# Patient Record
Sex: MALE | Race: WHITE | NOT HISPANIC OR LATINO | Employment: FULL TIME | URBAN - METROPOLITAN AREA
[De-identification: names, ages, dates, MRNs, and addresses within clinical notes are randomized per-mention and may not be internally consistent; named-entity substitution may affect disease eponyms.]

---

## 2024-05-22 ENCOUNTER — ANESTHESIA EVENT (INPATIENT)
Dept: PERIOP | Facility: HOSPITAL | Age: 39
End: 2024-05-22
Payer: COMMERCIAL

## 2024-05-22 ENCOUNTER — HOSPITAL ENCOUNTER (INPATIENT)
Facility: HOSPITAL | Age: 39
LOS: 14 days | Discharge: HOME | DRG: 710 | End: 2024-06-05
Attending: SURGERY | Admitting: SURGERY
Payer: COMMERCIAL

## 2024-05-22 ENCOUNTER — APPOINTMENT (EMERGENCY)
Dept: RADIOLOGY | Facility: HOSPITAL | Age: 39
End: 2024-05-22
Payer: COMMERCIAL

## 2024-05-22 ENCOUNTER — ANESTHESIA (INPATIENT)
Dept: PERIOP | Facility: HOSPITAL | Age: 39
End: 2024-05-22
Payer: COMMERCIAL

## 2024-05-22 ENCOUNTER — APPOINTMENT (EMERGENCY)
Dept: CT IMAGING | Facility: HOSPITAL | Age: 39
End: 2024-05-22
Payer: COMMERCIAL

## 2024-05-22 ENCOUNTER — APPOINTMENT (OUTPATIENT)
Dept: RADIOLOGY | Facility: HOSPITAL | Age: 39
DRG: 710 | End: 2024-05-22
Payer: COMMERCIAL

## 2024-05-22 ENCOUNTER — APPOINTMENT (INPATIENT)
Dept: RADIOLOGY | Facility: HOSPITAL | Age: 39
DRG: 710 | End: 2024-05-22
Payer: COMMERCIAL

## 2024-05-22 ENCOUNTER — HOSPITAL ENCOUNTER (EMERGENCY)
Facility: HOSPITAL | Age: 39
Discharge: NURSING, REHAB OR LONG-TERM CARE | End: 2024-05-22
Attending: EMERGENCY MEDICINE
Payer: COMMERCIAL

## 2024-05-22 VITALS
DIASTOLIC BLOOD PRESSURE: 59 MMHG | BODY MASS INDEX: 35.92 KG/M2 | RESPIRATION RATE: 44 BRPM | SYSTOLIC BLOOD PRESSURE: 109 MMHG | TEMPERATURE: 97.8 F | WEIGHT: 250.88 LBS | OXYGEN SATURATION: 94 % | HEART RATE: 117 BPM | HEIGHT: 70 IN

## 2024-05-22 DIAGNOSIS — J86.9 EMPYEMA, LEFT (HCC): ICD-10-CM

## 2024-05-22 DIAGNOSIS — K75.0 LIVER ABSCESS: Primary | ICD-10-CM

## 2024-05-22 DIAGNOSIS — K65.1: ICD-10-CM

## 2024-05-22 DIAGNOSIS — K75.0 ABSCESS OF LIVER: Primary | ICD-10-CM

## 2024-05-22 LAB
2HR DELTA HS TROPONIN: 0 NG/L
ABO GROUP BLD: NORMAL
ABO GROUP BLD: NORMAL
ALBUMIN SERPL BCP-MCNC: 2.3 G/DL (ref 3.5–5)
ALBUMIN SERPL BCP-MCNC: 2.8 G/DL (ref 3.5–5)
ALP SERPL-CCNC: 110 U/L (ref 34–104)
ALP SERPL-CCNC: 77 U/L (ref 34–104)
ALP SERPL-CCNC: 77 U/L (ref 34–104)
ALP SERPL-CCNC: 94 U/L (ref 34–104)
ALT SERPL W P-5'-P-CCNC: 104 U/L (ref 7–52)
ALT SERPL W P-5'-P-CCNC: 78 U/L (ref 7–52)
ALT SERPL W P-5'-P-CCNC: 78 U/L (ref 7–52)
ALT SERPL W P-5'-P-CCNC: 81 U/L (ref 7–52)
AMMONIA PLAS-SCNC: 89 UMOL/L (ref 18–72)
ANION GAP SERPL CALCULATED.3IONS-SCNC: 12 MMOL/L (ref 4–13)
ANION GAP SERPL CALCULATED.3IONS-SCNC: 17 MMOL/L (ref 4–13)
ANION GAP SERPL CALCULATED.3IONS-SCNC: 18 MMOL/L (ref 4–13)
APTT PPP: 25 SECONDS (ref 23–37)
ARTERIAL PATENCY WRIST A: NO
ARTERIAL PATENCY WRIST A: NO
AST SERPL W P-5'-P-CCNC: 62 U/L (ref 13–39)
AST SERPL W P-5'-P-CCNC: 77 U/L (ref 13–39)
AST SERPL W P-5'-P-CCNC: 85 U/L (ref 13–39)
AST SERPL W P-5'-P-CCNC: 85 U/L (ref 13–39)
BACTERIA UR QL AUTO: ABNORMAL /HPF
BASE EX.OXY STD BLDV CALC-SCNC: 60.6 % (ref 60–80)
BASE EX.OXY STD BLDV CALC-SCNC: 90.2 % (ref 60–80)
BASE EXCESS BLDA CALC-SCNC: -1 MMOL/L
BASE EXCESS BLDA CALC-SCNC: -10.5 MMOL/L
BASE EXCESS BLDA CALC-SCNC: -5.4 MMOL/L
BASE EXCESS BLDA CALC-SCNC: -6 MMOL/L (ref -2–3)
BASE EXCESS BLDA CALC-SCNC: -6 MMOL/L (ref -2–3)
BASE EXCESS BLDA CALC-SCNC: -9 MMOL/L (ref -2–3)
BASE EXCESS BLDV CALC-SCNC: -5.4 MMOL/L
BASE EXCESS BLDV CALC-SCNC: -5.5 MMOL/L
BASOPHILS # BLD MANUAL: 0 THOUSAND/UL (ref 0–0.1)
BASOPHILS NFR MAR MANUAL: 0 % (ref 0–1)
BILIRUB DIRECT SERPL-MCNC: 0.92 MG/DL (ref 0–0.2)
BILIRUB DIRECT SERPL-MCNC: 1.66 MG/DL (ref 0–0.2)
BILIRUB SERPL-MCNC: 2.52 MG/DL (ref 0.2–1)
BILIRUB SERPL-MCNC: 2.52 MG/DL (ref 0.2–1)
BILIRUB SERPL-MCNC: 3.17 MG/DL (ref 0.2–1)
BILIRUB SERPL-MCNC: 4.32 MG/DL (ref 0.2–1)
BILIRUB UR QL STRIP: ABNORMAL
BLD GP AB SCN SERPL QL: NEGATIVE
BUN SERPL-MCNC: 61 MG/DL (ref 5–25)
BUN SERPL-MCNC: 67 MG/DL (ref 5–25)
BUN SERPL-MCNC: 70 MG/DL (ref 5–25)
CA-I BLD-SCNC: 0.93 MMOL/L (ref 1.12–1.32)
CA-I BLD-SCNC: 1.05 MMOL/L (ref 1.12–1.32)
CA-I BLD-SCNC: 1.08 MMOL/L (ref 1.12–1.32)
CA-I BLD-SCNC: 1.15 MMOL/L (ref 1.12–1.32)
CALCIUM ALBUM COR SERPL-MCNC: 8.2 MG/DL (ref 8.3–10.1)
CALCIUM ALBUM COR SERPL-MCNC: 8.9 MG/DL (ref 8.3–10.1)
CALCIUM ALBUM COR SERPL-MCNC: 9.3 MG/DL (ref 8.3–10.1)
CALCIUM SERPL-MCNC: 6.8 MG/DL (ref 8.4–10.2)
CALCIUM SERPL-MCNC: 7.5 MG/DL (ref 8.4–10.2)
CALCIUM SERPL-MCNC: 8.3 MG/DL (ref 8.4–10.2)
CARDIAC TROPONIN I PNL SERPL HS: 6 NG/L
CARDIAC TROPONIN I PNL SERPL HS: 6 NG/L
CHLORIDE SERPL-SCNC: 103 MMOL/L (ref 96–108)
CHLORIDE SERPL-SCNC: 91 MMOL/L (ref 96–108)
CHLORIDE SERPL-SCNC: 96 MMOL/L (ref 96–108)
CLARITY UR: ABNORMAL
CO2 SERPL-SCNC: 19 MMOL/L (ref 21–32)
CO2 SERPL-SCNC: 20 MMOL/L (ref 21–32)
CO2 SERPL-SCNC: 22 MMOL/L (ref 21–32)
COLOR UR: ABNORMAL
CREAT SERPL-MCNC: 1.58 MG/DL (ref 0.6–1.3)
CREAT SERPL-MCNC: 2 MG/DL (ref 0.6–1.3)
CREAT SERPL-MCNC: 2.82 MG/DL (ref 0.6–1.3)
EOSINOPHIL # BLD MANUAL: 0.23 THOUSAND/UL (ref 0–0.4)
EOSINOPHIL NFR BLD MANUAL: 1 % (ref 0–6)
ERYTHROCYTE [DISTWIDTH] IN BLOOD BY AUTOMATED COUNT: 13.9 % (ref 11.6–15.1)
ERYTHROCYTE [DISTWIDTH] IN BLOOD BY AUTOMATED COUNT: 14 % (ref 11.6–15.1)
ERYTHROCYTE [DISTWIDTH] IN BLOOD BY AUTOMATED COUNT: 14.6 % (ref 11.6–15.1)
FLUAV RNA RESP QL NAA+PROBE: NEGATIVE
FLUBV RNA RESP QL NAA+PROBE: NEGATIVE
GFR SERPL CREATININE-BSD FRML MDRD: 26 ML/MIN/1.73SQ M
GFR SERPL CREATININE-BSD FRML MDRD: 40 ML/MIN/1.73SQ M
GFR SERPL CREATININE-BSD FRML MDRD: 54 ML/MIN/1.73SQ M
GLUCOSE SERPL-MCNC: 115 MG/DL (ref 65–140)
GLUCOSE SERPL-MCNC: 117 MG/DL (ref 65–140)
GLUCOSE SERPL-MCNC: 125 MG/DL (ref 65–140)
GLUCOSE SERPL-MCNC: 127 MG/DL (ref 65–140)
GLUCOSE SERPL-MCNC: 130 MG/DL (ref 65–140)
GLUCOSE SERPL-MCNC: 134 MG/DL (ref 65–140)
GLUCOSE UR STRIP-MCNC: NEGATIVE MG/DL
HCO3 BLDA-SCNC: 15.4 MMOL/L (ref 22–28)
HCO3 BLDA-SCNC: 17.9 MMOL/L (ref 22–28)
HCO3 BLDA-SCNC: 18.5 MMOL/L (ref 22–28)
HCO3 BLDA-SCNC: 19.3 MMOL/L (ref 22–28)
HCO3 BLDA-SCNC: 21.4 MMOL/L (ref 22–28)
HCO3 BLDA-SCNC: 22.7 MMOL/L (ref 22–28)
HCO3 BLDV-SCNC: 18.2 MMOL/L (ref 24–30)
HCO3 BLDV-SCNC: 20.8 MMOL/L (ref 24–30)
HCT VFR BLD AUTO: 34.1 % (ref 36.5–49.3)
HCT VFR BLD AUTO: 36.7 % (ref 36.5–49.3)
HCT VFR BLD AUTO: 40.9 % (ref 36.5–49.3)
HCT VFR BLD CALC: 32 % (ref 36.5–49.3)
HCT VFR BLD CALC: 36 % (ref 36.5–49.3)
HCT VFR BLD CALC: 36 % (ref 36.5–49.3)
HGB BLD-MCNC: 11.7 G/DL (ref 12–17)
HGB BLD-MCNC: 12.9 G/DL (ref 12–17)
HGB BLD-MCNC: 14.4 G/DL (ref 12–17)
HGB BLDA-MCNC: 10.9 G/DL (ref 12–17)
HGB BLDA-MCNC: 12.2 G/DL (ref 12–17)
HGB BLDA-MCNC: 12.2 G/DL (ref 12–17)
HGB UR QL STRIP.AUTO: ABNORMAL
HOROWITZ INDEX BLDA+IHG-RTO: 50 MM[HG]
HYALINE CASTS #/AREA URNS LPF: ABNORMAL /LPF
INR PPP: 1.32 (ref 0.84–1.19)
KETONES UR STRIP-MCNC: NEGATIVE MG/DL
LACTATE SERPL-SCNC: 1.3 MMOL/L (ref 0.5–2)
LACTATE SERPL-SCNC: 1.6 MMOL/L (ref 0.5–2)
LACTATE SERPL-SCNC: 1.9 MMOL/L (ref 0.5–2)
LACTATE SERPL-SCNC: 2.2 MMOL/L (ref 0.5–2)
LACTATE SERPL-SCNC: 2.4 MMOL/L (ref 0.5–2)
LACTATE SERPL-SCNC: 3 MMOL/L (ref 0.5–2)
LEUKOCYTE ESTERASE UR QL STRIP: NEGATIVE
LYMPHOCYTES # BLD AUTO: 2.07 THOUSAND/UL (ref 0.6–4.47)
LYMPHOCYTES # BLD AUTO: 8 % (ref 14–44)
MAGNESIUM SERPL-MCNC: 2.3 MG/DL (ref 1.9–2.7)
MAGNESIUM SERPL-MCNC: 2.4 MG/DL (ref 1.9–2.7)
MCH RBC QN AUTO: 29.9 PG (ref 26.8–34.3)
MCH RBC QN AUTO: 29.9 PG (ref 26.8–34.3)
MCH RBC QN AUTO: 30 PG (ref 26.8–34.3)
MCHC RBC AUTO-ENTMCNC: 34.3 G/DL (ref 31.4–37.4)
MCHC RBC AUTO-ENTMCNC: 35.1 G/DL (ref 31.4–37.4)
MCHC RBC AUTO-ENTMCNC: 35.2 G/DL (ref 31.4–37.4)
MCV RBC AUTO: 85 FL (ref 82–98)
MCV RBC AUTO: 85 FL (ref 82–98)
MCV RBC AUTO: 87 FL (ref 82–98)
MONOCYTES # BLD AUTO: 1.15 THOUSAND/UL (ref 0–1.22)
MONOCYTES NFR BLD: 5 % (ref 4–12)
MUCOUS THREADS UR QL AUTO: ABNORMAL
NASAL CANNULA: 3
NEUTROPHILS # BLD MANUAL: 19.58 THOUSAND/UL (ref 1.85–7.62)
NEUTS SEG NFR BLD AUTO: 85 % (ref 43–75)
NITRITE UR QL STRIP: NEGATIVE
NON-SQ EPI CELLS URNS QL MICRO: ABNORMAL /HPF
O2 CT BLDA-SCNC: 13.1 ML/DL (ref 16–23)
O2 CT BLDA-SCNC: 15.9 ML/DL (ref 16–23)
O2 CT BLDA-SCNC: 18.7 ML/DL (ref 16–23)
O2 CT BLDV-SCNC: 13.3 ML/DL
O2 CT BLDV-SCNC: 17.3 ML/DL
OXYHGB MFR BLDA: 95.5 % (ref 94–97)
OXYHGB MFR BLDA: 97.4 % (ref 94–97)
OXYHGB MFR BLDA: 97.9 % (ref 94–97)
PCO2 BLD: 19 MMOL/L (ref 21–32)
PCO2 BLD: 20 MMOL/L (ref 21–32)
PCO2 BLD: 23 MMOL/L (ref 21–32)
PCO2 BLD: 38.1 MM HG (ref 36–44)
PCO2 BLD: 40.8 MM HG (ref 36–44)
PCO2 BLD: 48.8 MM HG (ref 36–44)
PCO2 BLDA: 31.3 MM HG (ref 36–44)
PCO2 BLDA: 34.1 MM HG (ref 36–44)
PCO2 BLDA: 34.3 MM HG (ref 36–44)
PCO2 BLDV: 30.4 MM HG (ref 42–50)
PCO2 BLDV: 43.3 MM HG (ref 42–50)
PEEP RESPIRATORY: 6 CM[H2O]
PH BLD: 7.25 [PH] (ref 7.35–7.45)
PH BLD: 7.25 [PH] (ref 7.35–7.45)
PH BLD: 7.31 [PH] (ref 7.35–7.45)
PH BLDA: 7.27 [PH] (ref 7.35–7.45)
PH BLDA: 7.39 [PH] (ref 7.35–7.45)
PH BLDA: 7.44 [PH] (ref 7.35–7.45)
PH BLDV: 7.3 [PH] (ref 7.3–7.4)
PH BLDV: 7.4 [PH] (ref 7.3–7.4)
PH UR STRIP.AUTO: 5 [PH]
PHOSPHATE SERPL-MCNC: 5.7 MG/DL (ref 2.7–4.5)
PHOSPHATE SERPL-MCNC: 6.6 MG/DL (ref 2.7–4.5)
PLATELET # BLD AUTO: 254 THOUSANDS/UL (ref 149–390)
PLATELET # BLD AUTO: 265 THOUSANDS/UL (ref 149–390)
PLATELET # BLD AUTO: 284 THOUSANDS/UL (ref 149–390)
PLATELET BLD QL SMEAR: ADEQUATE
PMV BLD AUTO: 10.9 FL (ref 8.9–12.7)
PMV BLD AUTO: 10.9 FL (ref 8.9–12.7)
PMV BLD AUTO: 11 FL (ref 8.9–12.7)
PO2 BLD: 132 MM HG (ref 75–129)
PO2 BLD: 154 MM HG (ref 75–129)
PO2 BLD: 94 MM HG (ref 75–129)
PO2 BLDA: 104.1 MM HG (ref 75–129)
PO2 BLDA: 127.6 MM HG (ref 75–129)
PO2 BLDA: 138.2 MM HG (ref 75–129)
PO2 BLDV: 36.2 MM HG (ref 35–45)
PO2 BLDV: 66.2 MM HG (ref 35–45)
POTASSIUM BLD-SCNC: 3.2 MMOL/L (ref 3.5–5.3)
POTASSIUM BLD-SCNC: 3.9 MMOL/L (ref 3.5–5.3)
POTASSIUM BLD-SCNC: 4.3 MMOL/L (ref 3.5–5.3)
POTASSIUM SERPL-SCNC: 3.3 MMOL/L (ref 3.5–5.3)
POTASSIUM SERPL-SCNC: 3.3 MMOL/L (ref 3.5–5.3)
POTASSIUM SERPL-SCNC: 3.6 MMOL/L (ref 3.5–5.3)
PROCALCITONIN SERPL-MCNC: 85.78 NG/ML
PROT SERPL-MCNC: 4.6 G/DL (ref 6.4–8.4)
PROT SERPL-MCNC: 4.6 G/DL (ref 6.4–8.4)
PROT SERPL-MCNC: 5.4 G/DL (ref 6.4–8.4)
PROT SERPL-MCNC: 6.5 G/DL (ref 6.4–8.4)
PROT UR STRIP-MCNC: ABNORMAL MG/DL
PROTHROMBIN TIME: 17.1 SECONDS (ref 11.6–14.5)
PS SUPP: 8
PS VENT FIO2: 50
PS VENT PEEP: 6
RBC # BLD AUTO: 3.9 MILLION/UL (ref 3.88–5.62)
RBC # BLD AUTO: 4.31 MILLION/UL (ref 3.88–5.62)
RBC # BLD AUTO: 4.82 MILLION/UL (ref 3.88–5.62)
RBC #/AREA URNS AUTO: ABNORMAL /HPF
RBC MORPH BLD: NORMAL
RH BLD: POSITIVE
RH BLD: POSITIVE
RSV RNA RESP QL NAA+PROBE: NEGATIVE
SAO2 % BLD FROM PO2: 97 % (ref 60–85)
SAO2 % BLD FROM PO2: 98 % (ref 60–85)
SAO2 % BLD FROM PO2: 99 % (ref 60–85)
SARS-COV-2 RNA RESP QL NAA+PROBE: NEGATIVE
SIMV VENT INSPIRED AIR FIO2: 50
SIMV VENT PEEP: 6
SIMV VENT: ABNORMAL
SIMV VENT: ABNORMAL
SODIUM BLD-SCNC: 131 MMOL/L (ref 136–145)
SODIUM BLD-SCNC: 132 MMOL/L (ref 136–145)
SODIUM BLD-SCNC: 134 MMOL/L (ref 136–145)
SODIUM SERPL-SCNC: 131 MMOL/L (ref 135–147)
SODIUM SERPL-SCNC: 132 MMOL/L (ref 135–147)
SODIUM SERPL-SCNC: 135 MMOL/L (ref 135–147)
SP GR UR STRIP.AUTO: 1.02 (ref 1–1.03)
SPECIMEN EXPIRATION DATE: NORMAL
SPECIMEN SOURCE: ABNORMAL
UROBILINOGEN UR STRIP-ACNC: 4 MG/DL
VARIANT LYMPHS # BLD AUTO: 1 %
VENT - PS: ABNORMAL
VENT AC: 22
VENT SIMV: 18
VENT- AC: AC
WBC # BLD AUTO: 20.17 THOUSAND/UL (ref 4.31–10.16)
WBC # BLD AUTO: 21.29 THOUSAND/UL (ref 4.31–10.16)
WBC # BLD AUTO: 23.03 THOUSAND/UL (ref 4.31–10.16)
WBC #/AREA URNS AUTO: ABNORMAL /HPF
WBC CLUMPS # UR AUTO: PRESENT /UL

## 2024-05-22 PROCEDURE — 94664 DEMO&/EVAL PT USE INHALER: CPT

## 2024-05-22 PROCEDURE — 87075 CULTR BACTERIA EXCEPT BLOOD: CPT | Performed by: SURGERY

## 2024-05-22 PROCEDURE — 83605 ASSAY OF LACTIC ACID: CPT | Performed by: EMERGENCY MEDICINE

## 2024-05-22 PROCEDURE — 85730 THROMBOPLASTIN TIME PARTIAL: CPT | Performed by: EMERGENCY MEDICINE

## 2024-05-22 PROCEDURE — 85610 PROTHROMBIN TIME: CPT | Performed by: EMERGENCY MEDICINE

## 2024-05-22 PROCEDURE — 86901 BLOOD TYPING SEROLOGIC RH(D): CPT | Performed by: PHYSICIAN ASSISTANT

## 2024-05-22 PROCEDURE — 82140 ASSAY OF AMMONIA: CPT

## 2024-05-22 PROCEDURE — 71045 X-RAY EXAM CHEST 1 VIEW: CPT

## 2024-05-22 PROCEDURE — 76705 ECHO EXAM OF ABDOMEN: CPT

## 2024-05-22 PROCEDURE — 84100 ASSAY OF PHOSPHORUS: CPT

## 2024-05-22 PROCEDURE — 87070 CULTURE OTHR SPECIMN AEROBIC: CPT | Performed by: SURGERY

## 2024-05-22 PROCEDURE — 84100 ASSAY OF PHOSPHORUS: CPT | Performed by: STUDENT IN AN ORGANIZED HEALTH CARE EDUCATION/TRAINING PROGRAM

## 2024-05-22 PROCEDURE — 87102 FUNGUS ISOLATION CULTURE: CPT | Performed by: SURGERY

## 2024-05-22 PROCEDURE — 82805 BLOOD GASES W/O2 SATURATION: CPT | Performed by: STUDENT IN AN ORGANIZED HEALTH CARE EDUCATION/TRAINING PROGRAM

## 2024-05-22 PROCEDURE — 71250 CT THORAX DX C-: CPT

## 2024-05-22 PROCEDURE — 81001 URINALYSIS AUTO W/SCOPE: CPT | Performed by: EMERGENCY MEDICINE

## 2024-05-22 PROCEDURE — 80076 HEPATIC FUNCTION PANEL: CPT

## 2024-05-22 PROCEDURE — 85027 COMPLETE CBC AUTOMATED: CPT | Performed by: EMERGENCY MEDICINE

## 2024-05-22 PROCEDURE — 82330 ASSAY OF CALCIUM: CPT

## 2024-05-22 PROCEDURE — 87147 CULTURE TYPE IMMUNOLOGIC: CPT | Performed by: SURGERY

## 2024-05-22 PROCEDURE — 3E1M48X IRRIGATION OF PERITONEAL CAVITY USING IRRIGATING SUBSTANCE, PERCUTANEOUS ENDOSCOPIC APPROACH, DIAGNOSTIC: ICD-10-PCS | Performed by: SURGERY

## 2024-05-22 PROCEDURE — 0241U HB NFCT DS VIR RESP RNA 4 TRGT: CPT | Performed by: EMERGENCY MEDICINE

## 2024-05-22 PROCEDURE — 96366 THER/PROPH/DIAG IV INF ADDON: CPT

## 2024-05-22 PROCEDURE — C9113 INJ PANTOPRAZOLE SODIUM, VIA: HCPCS

## 2024-05-22 PROCEDURE — 84484 ASSAY OF TROPONIN QUANT: CPT | Performed by: EMERGENCY MEDICINE

## 2024-05-22 PROCEDURE — 85027 COMPLETE CBC AUTOMATED: CPT

## 2024-05-22 PROCEDURE — 82805 BLOOD GASES W/O2 SATURATION: CPT

## 2024-05-22 PROCEDURE — 84132 ASSAY OF SERUM POTASSIUM: CPT

## 2024-05-22 PROCEDURE — 82805 BLOOD GASES W/O2 SATURATION: CPT | Performed by: EMERGENCY MEDICINE

## 2024-05-22 PROCEDURE — 74176 CT ABD & PELVIS W/O CONTRAST: CPT

## 2024-05-22 PROCEDURE — 83605 ASSAY OF LACTIC ACID: CPT

## 2024-05-22 PROCEDURE — 94669 MECHANICAL CHEST WALL OSCILL: CPT

## 2024-05-22 PROCEDURE — 82805 BLOOD GASES W/O2 SATURATION: CPT | Performed by: PHYSICIAN ASSISTANT

## 2024-05-22 PROCEDURE — 87181 SC STD AGAR DILUTION PER AGT: CPT | Performed by: SURGERY

## 2024-05-22 PROCEDURE — 99255 IP/OBS CONSLTJ NEW/EST HI 80: CPT | Performed by: SURGERY

## 2024-05-22 PROCEDURE — 85014 HEMATOCRIT: CPT

## 2024-05-22 PROCEDURE — 99255 IP/OBS CONSLTJ NEW/EST HI 80: CPT | Performed by: INTERNAL MEDICINE

## 2024-05-22 PROCEDURE — 84295 ASSAY OF SERUM SODIUM: CPT

## 2024-05-22 PROCEDURE — 88305 TISSUE EXAM BY PATHOLOGIST: CPT | Performed by: PATHOLOGY

## 2024-05-22 PROCEDURE — 83735 ASSAY OF MAGNESIUM: CPT

## 2024-05-22 PROCEDURE — 84145 PROCALCITONIN (PCT): CPT | Performed by: EMERGENCY MEDICINE

## 2024-05-22 PROCEDURE — 94760 N-INVAS EAR/PLS OXIMETRY 1: CPT

## 2024-05-22 PROCEDURE — 82803 BLOOD GASES ANY COMBINATION: CPT

## 2024-05-22 PROCEDURE — 83735 ASSAY OF MAGNESIUM: CPT | Performed by: STUDENT IN AN ORGANIZED HEALTH CARE EDUCATION/TRAINING PROGRAM

## 2024-05-22 PROCEDURE — 82330 ASSAY OF CALCIUM: CPT | Performed by: PHYSICIAN ASSISTANT

## 2024-05-22 PROCEDURE — 87040 BLOOD CULTURE FOR BACTERIA: CPT | Performed by: EMERGENCY MEDICINE

## 2024-05-22 PROCEDURE — 96375 TX/PRO/DX INJ NEW DRUG ADDON: CPT

## 2024-05-22 PROCEDURE — 86900 BLOOD TYPING SEROLOGIC ABO: CPT | Performed by: PHYSICIAN ASSISTANT

## 2024-05-22 PROCEDURE — 83605 ASSAY OF LACTIC ACID: CPT | Performed by: STUDENT IN AN ORGANIZED HEALTH CARE EDUCATION/TRAINING PROGRAM

## 2024-05-22 PROCEDURE — 49000 EXPLORATION OF ABDOMEN: CPT | Performed by: SURGERY

## 2024-05-22 PROCEDURE — 96365 THER/PROPH/DIAG IV INF INIT: CPT

## 2024-05-22 PROCEDURE — 87185 SC STD ENZYME DETCJ PER NZM: CPT | Performed by: SURGERY

## 2024-05-22 PROCEDURE — 82947 ASSAY GLUCOSE BLOOD QUANT: CPT

## 2024-05-22 PROCEDURE — 80053 COMPREHEN METABOLIC PANEL: CPT

## 2024-05-22 PROCEDURE — 88112 CYTOPATH CELL ENHANCE TECH: CPT | Performed by: PATHOLOGY

## 2024-05-22 PROCEDURE — 93005 ELECTROCARDIOGRAM TRACING: CPT

## 2024-05-22 PROCEDURE — 99284 EMERGENCY DEPT VISIT MOD MDM: CPT

## 2024-05-22 PROCEDURE — 87040 BLOOD CULTURE FOR BACTERIA: CPT | Performed by: PHYSICIAN ASSISTANT

## 2024-05-22 PROCEDURE — 80053 COMPREHEN METABOLIC PANEL: CPT | Performed by: STUDENT IN AN ORGANIZED HEALTH CARE EDUCATION/TRAINING PROGRAM

## 2024-05-22 PROCEDURE — 94002 VENT MGMT INPAT INIT DAY: CPT

## 2024-05-22 PROCEDURE — 96376 TX/PRO/DX INJ SAME DRUG ADON: CPT

## 2024-05-22 PROCEDURE — 85007 BL SMEAR W/DIFF WBC COUNT: CPT | Performed by: EMERGENCY MEDICINE

## 2024-05-22 PROCEDURE — 99291 CRITICAL CARE FIRST HOUR: CPT | Performed by: STUDENT IN AN ORGANIZED HEALTH CARE EDUCATION/TRAINING PROGRAM

## 2024-05-22 PROCEDURE — 02HV33Z INSERTION OF INFUSION DEVICE INTO SUPERIOR VENA CAVA, PERCUTANEOUS APPROACH: ICD-10-PCS | Performed by: ANESTHESIOLOGY

## 2024-05-22 PROCEDURE — 85025 COMPLETE CBC W/AUTO DIFF WBC: CPT | Performed by: PHYSICIAN ASSISTANT

## 2024-05-22 PROCEDURE — 96368 THER/DIAG CONCURRENT INF: CPT

## 2024-05-22 PROCEDURE — 99291 CRITICAL CARE FIRST HOUR: CPT | Performed by: EMERGENCY MEDICINE

## 2024-05-22 PROCEDURE — 96367 TX/PROPH/DG ADDL SEQ IV INF: CPT

## 2024-05-22 PROCEDURE — 36415 COLL VENOUS BLD VENIPUNCTURE: CPT | Performed by: EMERGENCY MEDICINE

## 2024-05-22 PROCEDURE — 86850 RBC ANTIBODY SCREEN: CPT | Performed by: PHYSICIAN ASSISTANT

## 2024-05-22 PROCEDURE — 87205 SMEAR GRAM STAIN: CPT | Performed by: SURGERY

## 2024-05-22 PROCEDURE — 87086 URINE CULTURE/COLONY COUNT: CPT | Performed by: EMERGENCY MEDICINE

## 2024-05-22 PROCEDURE — 0F9000Z DRAINAGE OF LIVER WITH DRAINAGE DEVICE, OPEN APPROACH: ICD-10-PCS | Performed by: SURGERY

## 2024-05-22 PROCEDURE — 80053 COMPREHEN METABOLIC PANEL: CPT | Performed by: EMERGENCY MEDICINE

## 2024-05-22 PROCEDURE — 82248 BILIRUBIN DIRECT: CPT | Performed by: STUDENT IN AN ORGANIZED HEALTH CARE EDUCATION/TRAINING PROGRAM

## 2024-05-22 RX ORDER — CALCIUM CHLORIDE 100 MG/ML
INJECTION INTRAVENOUS; INTRAVENTRICULAR AS NEEDED
Status: DISCONTINUED | OUTPATIENT
Start: 2024-05-22 | End: 2024-05-22

## 2024-05-22 RX ORDER — FENTANYL CITRATE 50 UG/ML
50 INJECTION, SOLUTION INTRAMUSCULAR; INTRAVENOUS
Status: DISCONTINUED | OUTPATIENT
Start: 2024-05-22 | End: 2024-05-23

## 2024-05-22 RX ORDER — POTASSIUM CHLORIDE 14.9 MG/ML
20 INJECTION INTRAVENOUS ONCE
Status: COMPLETED | OUTPATIENT
Start: 2024-05-22 | End: 2024-05-22

## 2024-05-22 RX ORDER — SODIUM CHLORIDE, SODIUM GLUCONATE, SODIUM ACETATE, POTASSIUM CHLORIDE, MAGNESIUM CHLORIDE, SODIUM PHOSPHATE, DIBASIC, AND POTASSIUM PHOSPHATE .53; .5; .37; .037; .03; .012; .00082 G/100ML; G/100ML; G/100ML; G/100ML; G/100ML; G/100ML; G/100ML
1000 INJECTION, SOLUTION INTRAVENOUS ONCE
Status: COMPLETED | OUTPATIENT
Start: 2024-05-22 | End: 2024-05-22

## 2024-05-22 RX ORDER — SODIUM CHLORIDE 9 MG/ML
INJECTION, SOLUTION INTRAVENOUS CONTINUOUS PRN
Status: DISCONTINUED | OUTPATIENT
Start: 2024-05-22 | End: 2024-05-22

## 2024-05-22 RX ORDER — SODIUM CHLORIDE, SODIUM GLUCONATE, SODIUM ACETATE, POTASSIUM CHLORIDE, MAGNESIUM CHLORIDE, SODIUM PHOSPHATE, DIBASIC, AND POTASSIUM PHOSPHATE .53; .5; .37; .037; .03; .012; .00082 G/100ML; G/100ML; G/100ML; G/100ML; G/100ML; G/100ML; G/100ML
500 INJECTION, SOLUTION INTRAVENOUS ONCE
Status: COMPLETED | OUTPATIENT
Start: 2024-05-22 | End: 2024-05-23

## 2024-05-22 RX ORDER — FAMOTIDINE 10 MG/ML
20 INJECTION, SOLUTION INTRAVENOUS
Status: DISCONTINUED | OUTPATIENT
Start: 2024-05-22 | End: 2024-05-22

## 2024-05-22 RX ORDER — ALBUMIN, HUMAN INJ 5% 5 %
SOLUTION INTRAVENOUS CONTINUOUS PRN
Status: DISCONTINUED | OUTPATIENT
Start: 2024-05-22 | End: 2024-05-22

## 2024-05-22 RX ORDER — METRONIDAZOLE 500 MG/100ML
500 INJECTION, SOLUTION INTRAVENOUS EVERY 8 HOURS
Status: DISCONTINUED | OUTPATIENT
Start: 2024-05-22 | End: 2024-05-22 | Stop reason: HOSPADM

## 2024-05-22 RX ORDER — METHOCARBAMOL 750 MG/1
750 TABLET, FILM COATED ORAL EVERY 6 HOURS SCHEDULED
Status: DISCONTINUED | OUTPATIENT
Start: 2024-05-22 | End: 2024-05-22

## 2024-05-22 RX ORDER — FENTANYL CITRATE 50 UG/ML
50 INJECTION, SOLUTION INTRAMUSCULAR; INTRAVENOUS
Status: DISCONTINUED | OUTPATIENT
Start: 2024-05-22 | End: 2024-05-22

## 2024-05-22 RX ORDER — SUCCINYLCHOLINE/SOD CL,ISO/PF 100 MG/5ML
SYRINGE (ML) INTRAVENOUS AS NEEDED
Status: DISCONTINUED | OUTPATIENT
Start: 2024-05-22 | End: 2024-05-22

## 2024-05-22 RX ORDER — PANTOPRAZOLE SODIUM 40 MG/10ML
40 INJECTION, POWDER, LYOPHILIZED, FOR SOLUTION INTRAVENOUS
Status: DISCONTINUED | OUTPATIENT
Start: 2024-05-22 | End: 2024-05-30

## 2024-05-22 RX ORDER — FENTANYL CITRATE 50 UG/ML
50 INJECTION, SOLUTION INTRAMUSCULAR; INTRAVENOUS ONCE
Status: COMPLETED | OUTPATIENT
Start: 2024-05-22 | End: 2024-05-22

## 2024-05-22 RX ORDER — PROPOFOL 10 MG/ML
INJECTION, EMULSION INTRAVENOUS CONTINUOUS PRN
Status: DISCONTINUED | OUTPATIENT
Start: 2024-05-22 | End: 2024-05-22

## 2024-05-22 RX ORDER — CHLORHEXIDINE GLUCONATE ORAL RINSE 1.2 MG/ML
15 SOLUTION DENTAL EVERY 12 HOURS SCHEDULED
Status: DISCONTINUED | OUTPATIENT
Start: 2024-05-22 | End: 2024-05-22

## 2024-05-22 RX ORDER — MAGNESIUM HYDROXIDE 1200 MG/15ML
LIQUID ORAL AS NEEDED
Status: DISCONTINUED | OUTPATIENT
Start: 2024-05-22 | End: 2024-05-22 | Stop reason: HOSPADM

## 2024-05-22 RX ORDER — METRONIDAZOLE 500 MG/100ML
500 INJECTION, SOLUTION INTRAVENOUS EVERY 8 HOURS
Status: DISCONTINUED | OUTPATIENT
Start: 2024-05-22 | End: 2024-05-25

## 2024-05-22 RX ORDER — ONDANSETRON 2 MG/ML
4 INJECTION INTRAMUSCULAR; INTRAVENOUS EVERY 4 HOURS PRN
Status: DISCONTINUED | OUTPATIENT
Start: 2024-05-22 | End: 2024-06-05 | Stop reason: HOSPADM

## 2024-05-22 RX ORDER — ROCURONIUM BROMIDE 10 MG/ML
INJECTION, SOLUTION INTRAVENOUS AS NEEDED
Status: DISCONTINUED | OUTPATIENT
Start: 2024-05-22 | End: 2024-05-22

## 2024-05-22 RX ORDER — CHLORHEXIDINE GLUCONATE ORAL RINSE 1.2 MG/ML
15 SOLUTION DENTAL EVERY 12 HOURS SCHEDULED
Status: DISCONTINUED | OUTPATIENT
Start: 2024-05-22 | End: 2024-05-23

## 2024-05-22 RX ORDER — LIDOCAINE HYDROCHLORIDE 10 MG/ML
INJECTION, SOLUTION EPIDURAL; INFILTRATION; INTRACAUDAL; PERINEURAL AS NEEDED
Status: DISCONTINUED | OUTPATIENT
Start: 2024-05-22 | End: 2024-05-22

## 2024-05-22 RX ORDER — AMOXICILLIN 250 MG
2 CAPSULE ORAL 2 TIMES DAILY
Status: DISCONTINUED | OUTPATIENT
Start: 2024-05-22 | End: 2024-05-22

## 2024-05-22 RX ORDER — ACETAMINOPHEN 10 MG/ML
1000 INJECTION, SOLUTION INTRAVENOUS EVERY 6 HOURS PRN
Status: DISCONTINUED | OUTPATIENT
Start: 2024-05-22 | End: 2024-05-23

## 2024-05-22 RX ORDER — MIDAZOLAM HYDROCHLORIDE 2 MG/2ML
2 INJECTION, SOLUTION INTRAMUSCULAR; INTRAVENOUS EVERY 4 HOURS PRN
Status: DISCONTINUED | OUTPATIENT
Start: 2024-05-22 | End: 2024-05-22

## 2024-05-22 RX ORDER — BUPIVACAINE HYDROCHLORIDE 5 MG/ML
INJECTION, SOLUTION EPIDURAL; INTRACAUDAL AS NEEDED
Status: DISCONTINUED | OUTPATIENT
Start: 2024-05-22 | End: 2024-05-22 | Stop reason: HOSPADM

## 2024-05-22 RX ORDER — PROPOFOL 10 MG/ML
5-50 INJECTION, EMULSION INTRAVENOUS
Status: DISCONTINUED | OUTPATIENT
Start: 2024-05-22 | End: 2024-05-23

## 2024-05-22 RX ORDER — HEPARIN SODIUM 5000 [USP'U]/ML
5000 INJECTION, SOLUTION INTRAVENOUS; SUBCUTANEOUS EVERY 8 HOURS SCHEDULED
Status: DISCONTINUED | OUTPATIENT
Start: 2024-05-22 | End: 2024-06-05 | Stop reason: HOSPADM

## 2024-05-22 RX ORDER — ACETAMINOPHEN 325 MG/1
975 TABLET ORAL EVERY 8 HOURS SCHEDULED
Status: DISCONTINUED | OUTPATIENT
Start: 2024-05-22 | End: 2024-05-22

## 2024-05-22 RX ORDER — HYDROMORPHONE HCL/PF 1 MG/ML
0.5 SYRINGE (ML) INJECTION ONCE
Status: COMPLETED | OUTPATIENT
Start: 2024-05-22 | End: 2024-05-22

## 2024-05-22 RX ORDER — ACETAMINOPHEN 10 MG/ML
1000 INJECTION, SOLUTION INTRAVENOUS ONCE
Status: COMPLETED | OUTPATIENT
Start: 2024-05-22 | End: 2024-05-22

## 2024-05-22 RX ORDER — SODIUM CHLORIDE, SODIUM GLUCONATE, SODIUM ACETATE, POTASSIUM CHLORIDE, MAGNESIUM CHLORIDE, SODIUM PHOSPHATE, DIBASIC, AND POTASSIUM PHOSPHATE .53; .5; .37; .037; .03; .012; .00082 G/100ML; G/100ML; G/100ML; G/100ML; G/100ML; G/100ML; G/100ML
500 INJECTION, SOLUTION INTRAVENOUS ONCE
Status: COMPLETED | OUTPATIENT
Start: 2024-05-22 | End: 2024-05-22

## 2024-05-22 RX ORDER — SODIUM CHLORIDE, SODIUM LACTATE, POTASSIUM CHLORIDE, CALCIUM CHLORIDE 600; 310; 30; 20 MG/100ML; MG/100ML; MG/100ML; MG/100ML
125 INJECTION, SOLUTION INTRAVENOUS CONTINUOUS
Status: DISCONTINUED | OUTPATIENT
Start: 2024-05-22 | End: 2024-05-22

## 2024-05-22 RX ORDER — OXYCODONE HYDROCHLORIDE 5 MG/1
5 TABLET ORAL EVERY 4 HOURS PRN
Status: DISCONTINUED | OUTPATIENT
Start: 2024-05-22 | End: 2024-05-22

## 2024-05-22 RX ORDER — MIDAZOLAM HYDROCHLORIDE 2 MG/2ML
INJECTION, SOLUTION INTRAMUSCULAR; INTRAVENOUS AS NEEDED
Status: DISCONTINUED | OUTPATIENT
Start: 2024-05-22 | End: 2024-05-22

## 2024-05-22 RX ORDER — HYDROMORPHONE HCL IN WATER/PF 6 MG/30 ML
0.2 PATIENT CONTROLLED ANALGESIA SYRINGE INTRAVENOUS EVERY 2 HOUR PRN
Status: DISCONTINUED | OUTPATIENT
Start: 2024-05-22 | End: 2024-05-22

## 2024-05-22 RX ORDER — SODIUM CHLORIDE, SODIUM GLUCONATE, SODIUM ACETATE, POTASSIUM CHLORIDE, MAGNESIUM CHLORIDE, SODIUM PHOSPHATE, DIBASIC, AND POTASSIUM PHOSPHATE .53; .5; .37; .037; .03; .012; .00082 G/100ML; G/100ML; G/100ML; G/100ML; G/100ML; G/100ML; G/100ML
50 INJECTION, SOLUTION INTRAVENOUS CONTINUOUS
Status: DISCONTINUED | OUTPATIENT
Start: 2024-05-22 | End: 2024-05-28

## 2024-05-22 RX ORDER — FENTANYL CITRATE 50 UG/ML
100 INJECTION, SOLUTION INTRAMUSCULAR; INTRAVENOUS
Status: DISCONTINUED | OUTPATIENT
Start: 2024-05-22 | End: 2024-05-22

## 2024-05-22 RX ORDER — CALCIUM GLUCONATE 20 MG/ML
2 INJECTION, SOLUTION INTRAVENOUS ONCE
Status: COMPLETED | OUTPATIENT
Start: 2024-05-22 | End: 2024-05-22

## 2024-05-22 RX ORDER — FENTANYL CITRATE 50 UG/ML
INJECTION, SOLUTION INTRAMUSCULAR; INTRAVENOUS AS NEEDED
Status: DISCONTINUED | OUTPATIENT
Start: 2024-05-22 | End: 2024-05-22

## 2024-05-22 RX ORDER — SODIUM CHLORIDE, SODIUM GLUCONATE, SODIUM ACETATE, POTASSIUM CHLORIDE, MAGNESIUM CHLORIDE, SODIUM PHOSPHATE, DIBASIC, AND POTASSIUM PHOSPHATE .53; .5; .37; .037; .03; .012; .00082 G/100ML; G/100ML; G/100ML; G/100ML; G/100ML; G/100ML; G/100ML
1000 INJECTION, SOLUTION INTRAVENOUS ONCE
Status: COMPLETED | OUTPATIENT
Start: 2024-05-22 | End: 2024-05-23

## 2024-05-22 RX ORDER — METOCLOPRAMIDE HYDROCHLORIDE 5 MG/ML
10 INJECTION INTRAMUSCULAR; INTRAVENOUS ONCE
Status: COMPLETED | OUTPATIENT
Start: 2024-05-22 | End: 2024-05-22

## 2024-05-22 RX ORDER — ONDANSETRON 2 MG/ML
INJECTION INTRAMUSCULAR; INTRAVENOUS AS NEEDED
Status: DISCONTINUED | OUTPATIENT
Start: 2024-05-22 | End: 2024-05-22

## 2024-05-22 RX ORDER — SODIUM CHLORIDE, SODIUM LACTATE, POTASSIUM CHLORIDE, CALCIUM CHLORIDE 600; 310; 30; 20 MG/100ML; MG/100ML; MG/100ML; MG/100ML
INJECTION, SOLUTION INTRAVENOUS CONTINUOUS PRN
Status: DISCONTINUED | OUTPATIENT
Start: 2024-05-22 | End: 2024-05-22

## 2024-05-22 RX ORDER — PROPOFOL 10 MG/ML
INJECTION, EMULSION INTRAVENOUS AS NEEDED
Status: DISCONTINUED | OUTPATIENT
Start: 2024-05-22 | End: 2024-05-22

## 2024-05-22 RX ORDER — FENTANYL CITRATE/PF 50 MCG/ML
SYRINGE (ML) INJECTION
Status: COMPLETED
Start: 2024-05-22 | End: 2024-05-22

## 2024-05-22 RX ORDER — ONDANSETRON 2 MG/ML
1 INJECTION INTRAMUSCULAR; INTRAVENOUS ONCE
Status: COMPLETED | OUTPATIENT
Start: 2024-05-22 | End: 2024-05-22

## 2024-05-22 RX ORDER — MIDAZOLAM HYDROCHLORIDE 2 MG/2ML
INJECTION, SOLUTION INTRAMUSCULAR; INTRAVENOUS
Status: COMPLETED
Start: 2024-05-22 | End: 2024-05-22

## 2024-05-22 RX ORDER — KETAMINE HCL IN NACL, ISO-OSM 100MG/10ML
SYRINGE (ML) INJECTION AS NEEDED
Status: DISCONTINUED | OUTPATIENT
Start: 2024-05-22 | End: 2024-05-22

## 2024-05-22 RX ADMIN — MIDAZOLAM 2 MG: 1 INJECTION INTRAMUSCULAR; INTRAVENOUS at 15:36

## 2024-05-22 RX ADMIN — NOREPINEPHRINE BITARTRATE 16 MCG: 1 INJECTION, SOLUTION, CONCENTRATE INTRAVENOUS at 11:49

## 2024-05-22 RX ADMIN — HYDROMORPHONE HYDROCHLORIDE 0.2 MG: 0.2 INJECTION, SOLUTION INTRAMUSCULAR; INTRAVENOUS; SUBCUTANEOUS at 07:28

## 2024-05-22 RX ADMIN — HYDROMORPHONE HYDROCHLORIDE 0.5 MG: 1 INJECTION, SOLUTION INTRAMUSCULAR; INTRAVENOUS; SUBCUTANEOUS at 08:35

## 2024-05-22 RX ADMIN — Medication 100 MCG/HR: at 22:15

## 2024-05-22 RX ADMIN — ROCURONIUM BROMIDE 50 MG: 50 INJECTION, SOLUTION INTRAVENOUS at 11:31

## 2024-05-22 RX ADMIN — SODIUM CHLORIDE, SODIUM GLUCONATE, SODIUM ACETATE, POTASSIUM CHLORIDE, MAGNESIUM CHLORIDE, SODIUM PHOSPHATE, DIBASIC, AND POTASSIUM PHOSPHATE 1000 ML: .53; .5; .37; .037; .03; .012; .00082 INJECTION, SOLUTION INTRAVENOUS at 20:43

## 2024-05-22 RX ADMIN — LIDOCAINE HYDROCHLORIDE 50 MG: 10 INJECTION, SOLUTION EPIDURAL; INFILTRATION; INTRACAUDAL; PERINEURAL at 11:27

## 2024-05-22 RX ADMIN — SODIUM CHLORIDE, SODIUM GLUCONATE, SODIUM ACETATE, POTASSIUM CHLORIDE, MAGNESIUM CHLORIDE, SODIUM PHOSPHATE, DIBASIC, AND POTASSIUM PHOSPHATE 500 ML: .53; .5; .37; .037; .03; .012; .00082 INJECTION, SOLUTION INTRAVENOUS at 17:01

## 2024-05-22 RX ADMIN — ONDANSETRON 4 MG: 2 INJECTION INTRAMUSCULAR; INTRAVENOUS at 12:15

## 2024-05-22 RX ADMIN — SODIUM BICARBONATE 50 MEQ: 84 INJECTION, SOLUTION INTRAVENOUS at 13:41

## 2024-05-22 RX ADMIN — SODIUM CHLORIDE: 0.9 INJECTION, SOLUTION INTRAVENOUS at 11:39

## 2024-05-22 RX ADMIN — Medication 200 MG: at 11:27

## 2024-05-22 RX ADMIN — METRONIDAZOLE 500 MG: 500 INJECTION, SOLUTION INTRAVENOUS at 19:44

## 2024-05-22 RX ADMIN — Medication 50 MG: at 11:27

## 2024-05-22 RX ADMIN — SODIUM CHLORIDE, SODIUM GLUCONATE, SODIUM ACETATE, POTASSIUM CHLORIDE, MAGNESIUM CHLORIDE, SODIUM PHOSPHATE, DIBASIC, AND POTASSIUM PHOSPHATE 500 ML: .53; .5; .37; .037; .03; .012; .00082 INJECTION, SOLUTION INTRAVENOUS at 18:02

## 2024-05-22 RX ADMIN — METOCLOPRAMIDE 10 MG: 5 INJECTION, SOLUTION INTRAMUSCULAR; INTRAVENOUS at 09:02

## 2024-05-22 RX ADMIN — NOREPINEPHRINE BITARTRATE 16 MCG: 1 INJECTION, SOLUTION, CONCENTRATE INTRAVENOUS at 12:13

## 2024-05-22 RX ADMIN — PANTOPRAZOLE SODIUM 40 MG: 40 INJECTION, POWDER, FOR SOLUTION INTRAVENOUS at 09:38

## 2024-05-22 RX ADMIN — FAMOTIDINE 20 MG: 10 INJECTION INTRAVENOUS at 08:37

## 2024-05-22 RX ADMIN — VANCOMYCIN HYDROCHLORIDE 1750 MG: 5 INJECTION, POWDER, LYOPHILIZED, FOR SOLUTION INTRAVENOUS at 04:42

## 2024-05-22 RX ADMIN — SODIUM CHLORIDE, SODIUM GLUCONATE, SODIUM ACETATE, POTASSIUM CHLORIDE, MAGNESIUM CHLORIDE, SODIUM PHOSPHATE, DIBASIC, AND POTASSIUM PHOSPHATE 1000 ML: .53; .5; .37; .037; .03; .012; .00082 INJECTION, SOLUTION INTRAVENOUS at 03:06

## 2024-05-22 RX ADMIN — PROPOFOL 150 MG: 10 INJECTION, EMULSION INTRAVENOUS at 11:27

## 2024-05-22 RX ADMIN — PROPOFOL 50 MCG/KG/MIN: 10 INJECTION, EMULSION INTRAVENOUS at 13:54

## 2024-05-22 RX ADMIN — SODIUM CHLORIDE, SODIUM LACTATE, POTASSIUM CHLORIDE, AND CALCIUM CHLORIDE: .6; .31; .03; .02 INJECTION, SOLUTION INTRAVENOUS at 11:11

## 2024-05-22 RX ADMIN — ROCURONIUM BROMIDE 10 MG: 50 INJECTION, SOLUTION INTRAVENOUS at 12:16

## 2024-05-22 RX ADMIN — Medication 50 MCG/HR: at 14:50

## 2024-05-22 RX ADMIN — ROCURONIUM BROMIDE 10 MG: 50 INJECTION, SOLUTION INTRAVENOUS at 13:10

## 2024-05-22 RX ADMIN — CALCIUM GLUCONATE 2 G: 20 INJECTION, SOLUTION INTRAVENOUS at 17:41

## 2024-05-22 RX ADMIN — CALCIUM CHLORIDE 0.5 G: 100 INJECTION INTRAVENOUS; INTRAVENTRICULAR at 12:10

## 2024-05-22 RX ADMIN — FENTANYL CITRATE 50 MCG: 50 INJECTION INTRAMUSCULAR; INTRAVENOUS at 14:36

## 2024-05-22 RX ADMIN — METRONIDAZOLE 500 MG: 500 INJECTION, SOLUTION INTRAVENOUS at 04:27

## 2024-05-22 RX ADMIN — NOREPINEPHRINE BITARTRATE 16 MCG: 1 INJECTION, SOLUTION, CONCENTRATE INTRAVENOUS at 12:06

## 2024-05-22 RX ADMIN — SODIUM CHLORIDE, SODIUM LACTATE, POTASSIUM CHLORIDE, AND CALCIUM CHLORIDE: .6; .31; .03; .02 INJECTION, SOLUTION INTRAVENOUS at 13:19

## 2024-05-22 RX ADMIN — ROCURONIUM BROMIDE 10 MG: 50 INJECTION, SOLUTION INTRAVENOUS at 12:37

## 2024-05-22 RX ADMIN — NOREPINEPHRINE BITARTRATE 32 MCG: 1 INJECTION, SOLUTION, CONCENTRATE INTRAVENOUS at 12:52

## 2024-05-22 RX ADMIN — ACETAMINOPHEN 1000 MG: 10 INJECTION INTRAVENOUS at 18:09

## 2024-05-22 RX ADMIN — PROPOFOL 50 MCG/KG/MIN: 10 INJECTION, EMULSION INTRAVENOUS at 15:00

## 2024-05-22 RX ADMIN — PROPOFOL 50 MCG/KG/MIN: 10 INJECTION, EMULSION INTRAVENOUS at 20:45

## 2024-05-22 RX ADMIN — SODIUM CHLORIDE: 0.9 INJECTION, SOLUTION INTRAVENOUS at 13:10

## 2024-05-22 RX ADMIN — FENTANYL CITRATE 50 MCG: 0.05 INJECTION, SOLUTION INTRAMUSCULAR; INTRAVENOUS at 04:21

## 2024-05-22 RX ADMIN — CHLORHEXIDINE GLUCONATE 15 ML: 1.2 SOLUTION ORAL at 20:43

## 2024-05-22 RX ADMIN — FENTANYL CITRATE 100 MCG: 50 INJECTION INTRAMUSCULAR; INTRAVENOUS at 12:37

## 2024-05-22 RX ADMIN — CALCIUM CHLORIDE 0.5 G: 100 INJECTION INTRAVENOUS; INTRAVENTRICULAR at 13:05

## 2024-05-22 RX ADMIN — METRONIDAZOLE: 500 INJECTION, SOLUTION INTRAVENOUS at 11:33

## 2024-05-22 RX ADMIN — CEFTRIAXONE SODIUM 2000 MG: 10 INJECTION, POWDER, FOR SOLUTION INTRAVENOUS at 02:35

## 2024-05-22 RX ADMIN — NOREPINEPHRINE BITARTRATE 3 MCG/MIN: 1 INJECTION, SOLUTION, CONCENTRATE INTRAVENOUS at 11:51

## 2024-05-22 RX ADMIN — NOREPINEPHRINE BITARTRATE 16 MCG: 1 INJECTION, SOLUTION, CONCENTRATE INTRAVENOUS at 12:48

## 2024-05-22 RX ADMIN — SODIUM CHLORIDE, SODIUM GLUCONATE, SODIUM ACETATE, POTASSIUM CHLORIDE, MAGNESIUM CHLORIDE, SODIUM PHOSPHATE, DIBASIC, AND POTASSIUM PHOSPHATE 150 ML/HR: .53; .5; .37; .037; .03; .012; .00082 INJECTION, SOLUTION INTRAVENOUS at 07:34

## 2024-05-22 RX ADMIN — CHLORHEXIDINE GLUCONATE 15 ML: 1.2 SOLUTION ORAL at 08:38

## 2024-05-22 RX ADMIN — ONDANSETRON 4 MG: 2 INJECTION INTRAMUSCULAR; INTRAVENOUS at 07:36

## 2024-05-22 RX ADMIN — CEFEPIME 2000 MG: 2 INJECTION, POWDER, FOR SOLUTION INTRAVENOUS at 19:44

## 2024-05-22 RX ADMIN — MIDAZOLAM 2 MG: 1 INJECTION INTRAMUSCULAR; INTRAVENOUS at 11:11

## 2024-05-22 RX ADMIN — POTASSIUM CHLORIDE 20 MEQ: 14.9 INJECTION, SOLUTION INTRAVENOUS at 10:26

## 2024-05-22 RX ADMIN — SODIUM CHLORIDE, SODIUM GLUCONATE, SODIUM ACETATE, POTASSIUM CHLORIDE, MAGNESIUM CHLORIDE, SODIUM PHOSPHATE, DIBASIC, AND POTASSIUM PHOSPHATE 1000 ML: .53; .5; .37; .037; .03; .012; .00082 INJECTION, SOLUTION INTRAVENOUS at 03:05

## 2024-05-22 RX ADMIN — CALCIUM CHLORIDE 0.5 G: 100 INJECTION INTRAVENOUS; INTRAVENTRICULAR at 12:56

## 2024-05-22 RX ADMIN — FENTANYL CITRATE 50 MCG: 50 INJECTION INTRAMUSCULAR; INTRAVENOUS at 15:31

## 2024-05-22 RX ADMIN — ALBUMIN (HUMAN): 12.5 INJECTION, SOLUTION INTRAVENOUS at 12:58

## 2024-05-22 RX ADMIN — HEPARIN SODIUM 5000 UNITS: 5000 INJECTION INTRAVENOUS; SUBCUTANEOUS at 21:03

## 2024-05-22 RX ADMIN — CALCIUM CHLORIDE 0.5 G: 100 INJECTION INTRAVENOUS; INTRAVENTRICULAR at 12:14

## 2024-05-22 RX ADMIN — SODIUM CHLORIDE, SODIUM GLUCONATE, SODIUM ACETATE, POTASSIUM CHLORIDE, MAGNESIUM CHLORIDE, SODIUM PHOSPHATE, DIBASIC, AND POTASSIUM PHOSPHATE 150 ML/HR: .53; .5; .37; .037; .03; .012; .00082 INJECTION, SOLUTION INTRAVENOUS at 15:01

## 2024-05-22 RX ADMIN — MIDAZOLAM 2 MG: 1 INJECTION INTRAMUSCULAR; INTRAVENOUS at 19:44

## 2024-05-22 RX ADMIN — CEFEPIME 2000 MG: 2 INJECTION, POWDER, FOR SOLUTION INTRAVENOUS at 08:38

## 2024-05-22 RX ADMIN — ACETAMINOPHEN 1000 MG: 10 INJECTION INTRAVENOUS at 03:08

## 2024-05-22 RX ADMIN — PROPOFOL 50 MCG/KG/MIN: 10 INJECTION, EMULSION INTRAVENOUS at 18:08

## 2024-05-22 RX ADMIN — FENTANYL CITRATE 100 MCG: 50 INJECTION INTRAMUSCULAR; INTRAVENOUS at 11:27

## 2024-05-22 RX ADMIN — ALBUMIN (HUMAN): 12.5 INJECTION, SOLUTION INTRAVENOUS at 13:44

## 2024-05-22 RX ADMIN — SODIUM CHLORIDE, SODIUM GLUCONATE, SODIUM ACETATE, POTASSIUM CHLORIDE, MAGNESIUM CHLORIDE, SODIUM PHOSPHATE, DIBASIC, AND POTASSIUM PHOSPHATE 1000 ML: .53; .5; .37; .037; .03; .012; .00082 INJECTION, SOLUTION INTRAVENOUS at 04:19

## 2024-05-22 NOTE — ANESTHESIA PROCEDURE NOTES
"Central Line Insertion    Performed by: Queta Aldana MD  Authorized by: Queta Aldana MD    Date/Time: 5/22/2024 11:39 AM  Catheter Type:  triple lumen  Consent: Verbal consent obtained. Written consent obtained.  Risks and benefits: risks, benefits and alternatives were discussed  Consent given by: patient  Patient understanding: patient states understanding of the procedure being performed  Patient consent: the patient's understanding of the procedure matches consent given  Required items: required blood products, implants, devices, and special equipment available  Patient identity confirmed: arm band, provided demographic data and hospital-assigned identification number  Time out: Immediately prior to procedure a \"time out\" was called to verify the correct patient, procedure, equipment, support staff and site/side marked as required.  Indications: vascular access and central pressure monitoring  Catheter size: 7 Fr  Patient position: Trendelenburg  Assessment: blood return through all ports and free fluid flow  Preparation: skin prepped with 2% chlorhexidine  Skin prep agent dried: skin prep agent completely dried prior to procedure  Sterile barriers: all five maximum sterile barriers used - cap, mask, sterile gown, sterile gloves, and large sterile sheet  Hand hygiene: hand hygiene performed prior to central venous catheter insertion  sterile gel and probe cover used in ultrasound-guided central venous catheter insertionultrasound permanent image saved  Pre-procedure: landmarks identified  Vessel of Catheter Tip End: svc  Number of attempts: 1  Post-procedure: line sutured and dressing applied  Patient tolerance: Patient tolerated the procedure well with no immediate complications and patient tolerated the procedure well with no immediate complications        "

## 2024-05-22 NOTE — ED NOTES
Pt has received bolus of normal saline by EMS, Nursing staff started Isolyte and has received 300ml so far     Ioana Gray, NAGI  05/22/24 3748

## 2024-05-22 NOTE — EMTALA/ACUTE CARE TRANSFER
[facility] EMERGENCY DEPARTMENT  [address]  [place] [address]  Dept: [phone]      EMTALA TRANSFER CONSENT    NAME Diogenes Mullins                                         1985                              MRN [id number]    I have been informed of my rights regarding examination, treatment, and transfer   by Dr. Robin Rodarte MD    Benefits: Specialized equipment and/or services available at the receiving facility (Include comment)________________________ (SICU)    Risks: Potential for delay in receiving treatment, Potential deterioration of medical condition, Loss of IV, Increased discomfort during transfer, Possible worsening of condition or death during transfer      Consent for Transfer:  I acknowledge that my medical condition has been evaluated and explained to me by the emergency department physician or other qualified medical person and/or my attending physician, who has recommended that I be transferred to the service of  Accepting Physician: Redd at Accepting Facility Name, City & State : \[facility] [place] [facility]\"". The above potential benefits of such transfer, the potential risks associated with such transfer, and the probable risks of not being transferred have been explained to me, and I fully understand them.  The doctor has explained that, in my case, the benefits of transfer outweigh the risks.  I agree to be transferred.    I authorize the performance of emergency medical procedures and treatments upon me in both transit and upon arrival at the receiving facility.  Additionally, I authorize the release of any and all medical records to the receiving facility and request they be transported with me, if possible.  I understand that the safest mode of transportation during a medical emergency is an ambulance and that the Hospital advocates the use of this mode of transport. Risks of traveling to the receiving facility by car, including absence of medical control, life  sustaining equipment, such as oxygen, and medical personnel has been explained to me and I fully understand them.    (MARY CORRECT BOX BELOW)  [  ]  I consent to the stated transfer and to be transported by ambulance/helicopter.  [  ]  I consent to the stated transfer, but refuse transportation by ambulance and accept full responsibility for my transportation by car.  I understand the risks of non-ambulance transfers and I exonerate the Hospital and its staff from any deterioration in my condition that results from this refusal.    X___________________________________________    DATE  24  TIME________  Signature of patient or legally responsible individual signing on patient behalf           RELATIONSHIP TO PATIENT_________________________          Provider Certification    NAME Diogenes Mullins                                         1985                              MRN [id number]    A medical screening exam was performed on the above named patient.  Based on the examination:    Condition Necessitating Transfer The primary encounter diagnosis was Abscess of liver. A diagnosis of Peritonitis due to abscess (HCC) was also pertinent to this visit.    Patient Condition: The patient has been stabilized such that within reasonable medical probability, no material deterioration of the patient condition or the condition of the unborn child(rahel) is likely to result from the transfer    Reason for Transfer: Level of Care needed not available at this facility    Transfer Requirements: Facility B   Space available and qualified personnel available for treatment as acknowledged by PACS  Agreed to accept transfer and to provide appropriate medical treatment as acknowledged by       Redd  Appropriate medical records of the examination and treatment of the patient are provided at the time of transfer   STAFF INITIAL WHEN COMPLETED _______  Transfer will be performed by qualified personnel from [facility]   and appropriate transfer equipment as required, including the use of necessary and appropriate life support measures.    Provider Certification: I have examined the patient and explained the following risks and benefits of being transferred/refusing transfer to the patient/family:  General risk, such as traffic hazards, adverse weather conditions, rough terrain or turbulence, possible failure of equipment (including vehicle or aircraft), or consequences of actions of persons outside the control of the transport personnel, Risk of worsening condition, The possibility of a transport vehicle being unavailable, Unanticipated needs of medical equipment and personnel during transport      Based on these reasonable risks and benefits to the patient and/or the unborn child(rahel), and based upon the information available at the time of the patient’s examination, I certify that the medical benefits reasonably to be expected from the provision of appropriate medical treatments at another medical facility outweigh the increasing risks, if any, to the individual’s medical condition, and in the case of labor to the unborn child, from effecting the transfer.    X____________________________________________ DATE 05/22/24        TIME_______      ORIGINAL - SEND TO MEDICAL RECORDS   COPY - SEND WITH PATIENT DURING TRANSFER

## 2024-05-22 NOTE — RESPIRATORY THERAPY NOTE
05/22/24 8842   Respiratory Assessment   Resp Comments Called to bedside for pt dysynchromous with vent. Upon my arrival, pt breath stacking and appearing to be uncomfortable. Placed pt into SPONT mode; pt with instant Imporvement.  made aware   Vent Information   Vent type Smith G5   Smith Vent Mode SPONT   SPONT Settings   FIO2 (%) 50 %   PEEP (cmH2O) 6 cmH2O   Pressure Support (cmH2O) 10 cmH20   Flow Trigger (LPM) 5 LPM   P-ramp (ms) 50 ms   ETS  (%) 25 %   SPONT Actuals   Resp Rate (BPM) 22 BPM   VT (mL) 695 mL   MV (Obs) 15.5   MAP (cmH2O) 9.8 cmH2O   Peak Pressure (cmH2O) 20 cmH2O   I:E Ratio (Obs) 1/2.2   SPONT Alarms   High Peak Pressure (cmH20) 45 cmH2O   High Resp Rate (BPM) 45 BPM   High MV (L/min) 20 L/min   Low MV (L/min) 4 L/min   High Spont VTE (mL) 1200 mL   Low Spont VTE (mL) 250 mL   Apnea Time (S) 20 S   SPONT Apnea Settings   Resp Rate (BPM) 15 BPM   FIO2 (%) 100 %   %TI (%) 1.3 %   Apnea Time (S) 20 S

## 2024-05-22 NOTE — ANESTHESIA POSTPROCEDURE EVALUATION
Post-Op Assessment Note    CV Status:  Stable  Pain Score: 0    Pain management: adequate       Mental Status:  Unresponsive (AMANDA, patient intubated/sedated)   Hydration Status:  Euvolemic   PONV Controlled:  Controlled   Airway Patency:  Patent     Post Op Vitals Reviewed: Yes    No anethesia notable event occurred.    Staff: CRNA   Comments: pt transported to MICU, report given at bedside, 8.0 ETT in situ, prop gtt infusing, norepi gtt stopped in unit              BP   182/78   Temp      Pulse  120   Resp   22   SpO2   98%

## 2024-05-22 NOTE — QUICK NOTE
"Postop check    Subjective: Patient is a 38-year-old male with no significant past medical history presenting status post ex lap for drainage of hepatic abscess, lavage, and drain placement (4 drains placed).  Patient currently intubated, mechanical ventilation, and sedated.  Vital stable at this time.    Objective:  Physical Exam  Vitals and nursing note reviewed.   Constitutional:       General: He is not in acute distress.     Appearance: He is obese. He is ill-appearing.   HENT:      Head: Normocephalic and atraumatic.      Right Ear: External ear normal.      Left Ear: External ear normal.      Nose: Nose normal.      Mouth/Throat:      Mouth: Mucous membranes are moist.      Pharynx: Oropharynx is clear.   Eyes:      Extraocular Movements: Extraocular movements intact.      Conjunctiva/sclera: Conjunctivae normal.      Pupils: Pupils are equal, round, and reactive to light.   Cardiovascular:      Rate and Rhythm: Regular rhythm. Tachycardia present.      Pulses: Normal pulses.      Heart sounds: Normal heart sounds.   Pulmonary:      Breath sounds: Normal breath sounds.      Comments: Patient intubated mechanically ventilated.  Lungs clear bilaterally, no focal rhonchi rales or wheezes.  Abdominal:      General: Abdomen is flat. Bowel sounds are normal.      Palpations: Abdomen is soft.      Comments: Patient has 4 SYMONE drains in place for the right upper right lower and left upper and left lower quadrants.  Dressings are C/D/I.  Mix of pus and serous sang noted in the drains.  Patient also has midline ex lap incision, bandage is C/D/I.   Musculoskeletal:      Cervical back: Normal range of motion and neck supple.   Skin:     General: Skin is warm.      Capillary Refill: Capillary refill takes less than 2 seconds.   Neurological:      Comments: Patient sedated at this time.     Visit Vitals  /60   Pulse (!) 118   Temp 98.1 °F (36.7 °C) (Oral)   Resp 22   Ht 5' 10\" (1.778 m)   Wt 114 kg (250 lb 10.6 oz) "   SpO2 96%   BMI 35.97 kg/m²   Smoking Status Former   BSA 2.3 m²     Results Reviewed       None             Assessment:  Patient is a 38-year-old male with no significant past medical history now postop day 0 following ex lap lavage and drain placement for hepatic abscess.    Plan:  -Maintain mechanical ventilation and sedation on propofol fentanyl  -Maps greater than 65, pressors and fluid bolus as needed  -Follow-up postop labs  -Care per surgery team, appreciate recs  -Isolyte at 150  -Continue antibiotics, appreciate ID recs  -Monitor SYMONE drain x 4 output  -Multimodal analgesia  -Potentially plan for extubation later today or tomorrow.

## 2024-05-22 NOTE — ED PROVIDER NOTES
"History  Chief Complaint   Patient presents with    Flu Symptoms     Pt states n/v, sweats, cough, chills for about 9 days. Ems reports dyspnea at rest and on exertion, gave 4mg of zofran. Denies headaches. Also states a discomfort while urinating and possible blood in urine     38-year-old male presents emergency room with multiple symptoms that started 9 days ago.    Patient states his symptoms started when he cooked himself beef bourguignon and states \"it didn't sit well.\"  Patient states he developed nausea and vomiting and states \"I figured it was a foodborne illness.\"    Patient states the symptoms lasted for days and he developed chills mainly at night along with uncontrollable shivering.    Patient states he developed dyspnea along with \"terrible spasms\" which he states he felt \"I couldn't expand my chest.\"     Patient affirms persistent lower abdominal pain that has been ongoing though it is more on the right than the left.    Patient's states he felt intermittently diaphoretic and with persistent chills.    Patient states he has decreased sensation on the left thigh that has been persistent for the past 5 days though he affirms he has sensory in this region.    Patient denies any cough or congestion.  Patient denies any headache or neck pain.    Patient denies any history of immunocompromise state.  Patient denies any concern for HIV with no recent sexual partners.  Patient denies any use of IV drugs.  Patient denies any travel outside the [place].  Patient denies any recent tick bites.    Impression and plan: Multiple symptoms with a broad differential though based on patient's history and examination clinically this is concerning for potential infectious etiology.  Patient is tachycardic and tachypneic upon arrival meeting SIRS criteria.  Will initiate sepsis evaluation including treatment with IV antibiotics and fluids.  Patient had been initiated on 1 L of normal saline by EMS prior to arrival, " will complete this and initiate treatment with isolyte following this.  Based on history, could represent cholecystitis considering patient is mildly jaundiced on initial evaluation.  Will obtain laboratory evaluation and CT imaging of patient's chest, abdomen, and pelvis for further evaluation of source.  Will monitor patient, reassess, plan for admission for continued monitoring and evaluation.    Critical Care Time  - Authorized and Performed by: Irwin Rodarte MD  - Total critical care time: 88 minutes  - Due to a high probability of clinically significant, life threatening deterioration, the patient required my highest level of preparedness to intervene emergently and I personally spent this critical care time directly and personally managing the patient. This critical care time included obtaining a history; examining the patient; pulse oximetry; ordering and review of studies; arranging urgent treatment with development of a management plan; evaluation of patient's response to treatment; frequent reassessment; and, discussions with other providers.  - This critical care time was performed to assess and manage the high probability of imminent, life-threatening deterioration that could result in multi-organ failure. It was exclusive of separately billable procedures and treating other patients and teaching time.            None       History reviewed. No pertinent past medical history.    History reviewed. No pertinent surgical history.    History reviewed. No pertinent family history.  I have reviewed and agree with the history as documented.    E-Cigarette/Vaping    E-Cigarette Use Former User      E-Cigarette/Vaping Substances     Social History     Tobacco Use    Smoking status: Former     Types: Cigarettes    Smokeless tobacco: Never   Vaping Use    Vaping status: Former   Substance Use Topics    Alcohol use: Not Currently    Drug use: Never       Review of Systems    Physical Exam  Physical Exam    Vital  Signs  ED Triage Vitals   Temperature Pulse Respirations Blood Pressure SpO2   05/22/24 0134 05/22/24 0132 05/22/24 0132 05/22/24 0137 05/22/24 0134   97.8 °F (36.6 °C) (!) 121 (!) 37 107/75 94 %      Temp Source Heart Rate Source Patient Position - Orthostatic VS BP Location FiO2 (%)   05/22/24 0134 05/22/24 0132 05/22/24 0200 05/22/24 0200 --   Oral Monitor Sitting Right arm       Pain Score       05/22/24 0421       10 - Worst Possible Pain           Vitals:    05/22/24 0330 05/22/24 0430 05/22/24 0445 05/22/24 0515   BP: 110/61 113/66 119/60 109/59   Pulse: (!) 115 (!) 119 (!) 119 (!) 117   Patient Position - Orthostatic VS: Sitting Sitting Sitting Sitting         Visual Acuity      ED Medications  Medications   ondansetron (FOR EMS ONLY) (ZOFRAN) 4 mg/2 mL injection 4 mg (0 mg Does not apply Given to EMS 5/22/24 0148)   ceftriaxone (ROCEPHIN) 2 g/50 mL in dextrose IVPB (0 mg Intravenous Stopped 5/22/24 0305)   acetaminophen (Ofirmev) injection 1,000 mg (0 mg Intravenous Stopped 5/22/24 0343)   multi-electrolyte (ISOLYTE-S PH 7.4) bolus 1,000 mL (0 mL Intravenous Stopped 5/22/24 0306)   multi-electrolyte (ISOLYTE-S PH 7.4) bolus 1,000 mL (0 mL Intravenous Stopped 5/22/24 0414)   fentaNYL injection 50 mcg (50 mcg Intravenous Given 5/22/24 0421)   multi-electrolyte (ISOLYTE-S PH 7.4) bolus 1,000 mL (0 mL Intravenous Stopped 5/22/24 0532)       Diagnostic Studies  Results Reviewed       Procedure Component Value Units Date/Time    Blood culture #1 [[id number]] Collected: 05/22/24 0210    Lab Status: Preliminary result Specimen: Blood from Arm, Left Updated: 05/22/24 0901     Blood Culture Received in Microbiology Lab. Culture in Progress.    Blood culture #2 [[id number]] Collected: 05/22/24 0150    Lab Status: Preliminary result Specimen: Blood from Arm, Right Updated: 05/22/24 0901     Blood Culture Received in Microbiology Lab. Culture in Progress.    HS Troponin I 2hr [[id number]]  (Normal) Collected: 05/22/24 3234     Lab Status: Final result Specimen: Blood from Arm, Left Updated: 05/22/24 0458     hs TnI 2hr 6 ng/L      Delta 2hr hsTnI 0 ng/L     Lactic acid 2 Hours [[id number]]  (Abnormal) Collected: 05/22/24 0433    Lab Status: Final result Specimen: Blood from Arm, Right Updated: 05/22/24 0456     LACTIC ACID 2.2 mmol/L     Narrative:      Result may be elevated if tourniquet was used during collection.    Urine Microscopic [[id number]]  (Abnormal) Collected: 05/22/24 0415    Lab Status: Final result Specimen: Urine, Clean Catch Updated: 05/22/24 0425     RBC, UA 4-10 /hpf      WBC, UA 20-30 /hpf      Epithelial Cells Occasional /hpf      Bacteria, UA Occasional /hpf      MUCUS THREADS Moderate     Hyaline Casts, UA 5-10 /lpf      WBC Clumps Present    Urine culture [[id number]] Collected: 05/22/24 0415    Lab Status: In process Specimen: Urine, Clean Catch Updated: 05/22/24 0425    UA w Reflex to Microscopic w Reflex to Culture [[id number]]  (Abnormal) Collected: 05/22/24 0415    Lab Status: Final result Specimen: Urine, Clean Catch Updated: 05/22/24 0423     Color, UA Dark Yellow     Clarity, UA Extra Turbid     Specific Gravity, UA 1.025     pH, UA 5.0     Leukocytes, UA Negative     Nitrite, UA Negative     Protein, UA 50 (1+) mg/dl      Glucose, UA Negative mg/dl      Ketones, UA Negative mg/dl      Urobilinogen, UA 4.0 mg/dl      Bilirubin, UA Small     Occult Blood, UA Trace    RBC Morphology Reflex Test [[id number]] Collected: 05/22/24 0146    Lab Status: Final result Specimen: Blood from Arm, Right Updated: 05/22/24 0301    Blood gas, venous [[id number]]  (Abnormal) Collected: 05/22/24 0234    Lab Status: Final result Specimen: Blood from Arm, Right Updated: 05/22/24 0241     pH, Wei 7.300     pCO2, Ewi 43.3 mm Hg      pO2, Wei 36.2 mm Hg      HCO3, Wei 20.8 mmol/L      Base Excess, Wei -5.5 mmol/L      O2 Content, Wei 13.3 ml/dL      O2 HGB, VENOUS 60.6 %     CBC and differential [[id number]]  (Abnormal) Collected:  05/22/24 0146    Lab Status: Final result Specimen: Blood from Arm, Right Updated: 05/22/24 0226     WBC 23.03 Thousand/uL      RBC 4.82 Million/uL      Hemoglobin 14.4 g/dL      Hematocrit 40.9 %      MCV 85 fL      MCH 29.9 pg      MCHC 35.2 g/dL      RDW 13.9 %      MPV 11.0 fL      Platelets 284 Thousands/uL     Narrative:      This is an appended report.  These results have been appended to a previously verified report.    Manual Differential(PHLEBS Do Not Order) [[id number]]  (Abnormal) Collected: 05/22/24 0146    Lab Status: Final result Specimen: Blood from Arm, Right Updated: 05/22/24 0226     Segmented % 85 %      Lymphocytes % 8 %      Monocytes % 5 %      Eosinophils % 1 %      Basophils % 0 %      Atypical Lymphocytes % 1 %      Absolute Neutrophils 19.58 Thousand/uL      Absolute Lymphocytes 2.07 Thousand/uL      Absolute Monocytes 1.15 Thousand/uL      Absolute Eosinophils 0.23 Thousand/uL      Absolute Basophils 0.00 Thousand/uL      Total Counted --     RBC Morphology Normal     Platelet Estimate Adequate    FLU/RSV/COVID - if FLU/RSV clinically relevant [[id number]]  (Normal) Collected: 05/22/24 0141    Lab Status: Final result Specimen: Nares from Nose Updated: 05/22/24 0223     SARS-CoV-2 Negative     INFLUENZA A PCR Negative     INFLUENZA B PCR Negative     RSV PCR Negative    Narrative:      FOR PEDIATRIC PATIENTS - copy/paste COVID Guidelines URL to browser: [email or web address]    SARS-CoV-2 assay is a Nucleic Acid Amplification assay intended for the  qualitative detection of nucleic acid from SARS-CoV-2 in nasopharyngeal  swabs. Results are for the presumptive identification of SARS-CoV-2 RNA.    Positive results are indicative of infection with SARS-CoV-2, the virus  causing COVID-19, but do not rule out bacterial infection or co-infection  with other viruses. Laboratories within the [place] and its  territories are required to  report all positive results to the appropriate  public health authorities. Negative results do not preclude SARS-CoV-2  infection and should not be used as the sole basis for treatment or other  patient management decisions. Negative results must be combined with  clinical observations, patient history, and epidemiological information.  This test has not been FDA cleared or approved.    This test has been authorized by FDA under an Emergency Use Authorization  (EUA). This test is only authorized for the duration of time the  declaration that circumstances exist justifying the authorization of the  emergency use of an in vitro diagnostic tests for detection of SARS-CoV-2  virus and/or diagnosis of COVID-19 infection under section 564(b)(1) of  the Act, 21 U.S.C. 360bbb-3(b)(1), unless the authorization is terminated  or revoked sooner. The test has been validated but independent review by FDA  and CLIA is pending.    Test performed using [employer] Gene[employer]pert: This RT-PCR assay targets N2,  a region unique to SARS-CoV-2. A conserved region in the E-gene was chosen  for pan-Sarbecovirus detection which includes SARS-CoV-2.    According to CMS-2020-01-R, this platform meets the definition of high-throughput technology.    Procalcitonin [[id number]]  (Abnormal) Collected: 05/22/24 0150    Lab Status: Final result Specimen: Blood from Arm, Right Updated: 05/22/24 0221     Procalcitonin 85.78 ng/ml     HS Troponin 0hr (reflex protocol) [[id number]]  (Normal) Collected: 05/22/24 0146    Lab Status: Final result Specimen: Blood from Arm, Right Updated: 05/22/24 0220     hs TnI 0hr 6 ng/L     APTT [[id number]]  (Normal) Collected: 05/22/24 0150    Lab Status: Final result Specimen: Blood from Arm, Right Updated: 05/22/24 0217     PTT 25 seconds     Protime-INR [[id number]]  (Abnormal) Collected: 05/22/24 0150    Lab Status: Final result Specimen: Blood from Arm, Right Updated: 05/22/24 0217     Protime 17.1 seconds      INR 1.32     Comprehensive metabolic panel [[id number]]  (Abnormal) Collected: 05/22/24 0146    Lab Status: Final result Specimen: Blood from Arm, Right Updated: 05/22/24 0216     Sodium 131 mmol/L      Potassium 3.3 mmol/L      Chloride 91 mmol/L      CO2 22 mmol/L      ANION GAP 18 mmol/L      BUN 67 mg/dL      Creatinine 2.82 mg/dL      Glucose 115 mg/dL      Calcium 8.3 mg/dL      Corrected Calcium 9.3 mg/dL      AST 77 U/L       U/L      Alkaline Phosphatase 110 U/L      Total Protein 6.5 g/dL      Albumin 2.8 g/dL      Total Bilirubin 4.32 mg/dL      eGFR 26 ml/min/1.73sq m     Narrative:      National Kidney Disease Foundation guidelines for Chronic Kidney Disease (CKD):     Stage 1 with normal or high GFR (GFR > 90 mL/min/1.73 square meters)    Stage 2 Mild CKD (GFR = 60-89 mL/min/1.73 square meters)    Stage 3A Moderate CKD (GFR = 45-59 mL/min/1.73 square meters)    Stage 3B Moderate CKD (GFR = 30-44 mL/min/1.73 square meters)    Stage 4 Severe CKD (GFR = 15-29 mL/min/1.73 square meters)    Stage 5 End Stage CKD (GFR <15 mL/min/1.73 square meters)  Note: GFR calculation is accurate only with a steady state creatinine    Lactic acid, plasma (w/reflex if result > 2.0) [[id number]]  (Abnormal) Collected: 05/22/24 0150    Lab Status: Final result Specimen: Blood from Arm, Right Updated: 05/22/24 0215     LACTIC ACID 3.0 mmol/L     Narrative:      Result may be elevated if tourniquet was used during collection.                   CT chest abdomen pelvis wo contrast   Final Result by Dar Lerner DO (05/22 3301)      Findings are most concerning for perforated liver abscess with resultant peritonitis.               I personally discussed this study with MOISES PERALES on 5/22/2024 4:05 AM.            Workstation performed: [id number]         XR chest 1 view portable   Final Result by Corinne Hinkle MD (05/22 2750)   No acute consolidation or congestion            Workstation performed: [id number]                     Procedures  Procedures         ED Course  ED Course as of 05/23/24 0700   Wed May 22, 2024   0508 Patient CT on my evaluation demonstrates perforation with signs of peritonitis.  Discussed with radiology with concerns for potential liver abscess perforation causing the peritonitis.    Discussed with on-call surgery and ICU at [place].  After discussion with surgery, they reviewed imaging further and feel patient would be better cared for at at [facility] and they do not feel they can appropriately manage the patient here.    Patient reassessed, continues to deny any risk factors for this clearly.  No exposure to farm animals, only has 2 dogs.  Patient does note remote history 20 years ago when he was in [place] and developed food poisoning, last international travel was 15 years ago in [place].    No high risk sexual practices, no sex in over 9 years.                                             Medical Decision Making  Amount and/or Complexity of Data Reviewed  Labs: ordered.  Radiology: ordered.    Risk  Prescription drug management.             Disposition  Final diagnoses:   Abscess of liver   Peritonitis due to abscess (HCC)     Time reflects when diagnosis was documented in both MDM as applicable and the Disposition within this note       Time User Action Codes Description Comment    5/22/2024  4:34 AM Robin Rodarte Add [K75.0] Abscess of liver     5/22/2024  4:35 AM Robin Rodarte Add [K65.1] Peritonitis due to abscess (HCC)           ED Disposition       ED Disposition   Transfer to Another Facility-In Network    Condition   --    Date/Time   Wed May 22, 2024  4:34 AM    Comment   Henryjose Mullins should be transferred out to [facility].               MD Documentation      Flowsheet Row Most Recent Value   Patient Condition The patient has been stabilized such that within reasonable medical probability, no material deterioration of the patient condition or the condition of the unborn child(rahel) is likely to  result from the transfer   Reason for Transfer Level of Care needed not available at this facility   Benefits of Transfer Specialized equipment and/or services available at the receiving facility (Include comment)________________________  [SICU]   Risks of Transfer Potential for delay in receiving treatment, Potential deterioration of medical condition, Loss of IV, Increased discomfort during transfer, Possible worsening of condition or death during transfer   Accepting Physician Redd   Accepting Facility Name, [facility]    (Name & Tel number) PACS   Transported by (Company and Unit #) [employer]   Sending MD Rodarte   Provider Certification General risk, such as traffic hazards, adverse weather conditions, rough terrain or turbulence, possible failure of equipment (including vehicle or aircraft), or consequences of actions of persons outside the control of the transport personnel, Risk of worsening condition, The possibility of a transport vehicle being unavailable, Unanticipated needs of medical equipment and personnel during transport          RN Documentation      Flowsheet Row Most Recent Value   Accepting Facility Name, [facility]    (Name & Tel number) PACS   Transported by (Company and Unit #) [employer]          Follow-up Information    None         There are no discharge medications for this patient.      No discharge procedures on file.    PDMP Review       None            ED Provider  Electronically Signed by             Robin Rodarte MD  05/23/24 0754

## 2024-05-22 NOTE — CONSULTS
Consultation - Infectious Disease   Diogenes Mullins 38 y.o. male MRN: [id number]  [facility]: [id number]      IMPRESSION & RECOMMENDATIONS:   Impression/Recommendations:  1.  Severe sepsis.  Tachycardia, tachypnea, leukocytosis, elevated lactic acid.  Secondary to #2.  Consider possibility of bacteremia.  Blood cultures are still pending.    -Antibiotic plan as below  -Follow temperatures and hemodynamics  -CBC in AM to assess treatment response  -Supportive care per critical care service    2.  Perforated liver abscess with purulent peritonitis.  Status post exploratory laparotomy with drainage of abscess and four-quadrant peritoneal lavage.  Intraoperative findings included extensive four-quadrant purulent peritonitis with near frozen abdomen due to inflammation.  Unclear etiology.  No reported recent travel.  Abdominal CT with no other acute abnormalities including evidence of malignancy. RUQ US with no biliary abnormalities.  No known immunosuppressed state. Operative cultures are pending.    -Continue empiric vancomycin/cefepime/Flagyl for now  -Vancomycin dosing per pharmacy  -Follow-up operative cultures and tailor antibiotics accordingly  -Follow-up pathology  -Surgery follow-up ongoing  -Serial abdominal exams    3.  Acute respiratory insufficiency.  Remains intubated postoperatively.    -Ventilator management per critical care service  -Management as above    4.  Acute kidney injury.  In the setting of severe sepsis.  No acute  findings on CT.  Creatinine is improving.    -Recheck BMP in AM  -Dose adjust antibiotics based on renal function as indicated  -Volume management per critical care service    5.  Elevated LFTs.  In the setting of #1/#2.  No radiologic evidence of biliary obstruction.  LFTs are improving.    -Management as above  -Trend LFTs      Antibiotics:  Vancomycin/cefepime/Flagyl    I discussed above plan with critical care service who agrees with continuation of empiric  antibiotics..  Thank you for this consultation.  We will follow along with you.        HISTORY OF PRESENT ILLNESS:  Reason for Consult: Sepsis, perforated liver abscess    HPI: Diogenes Mullins is a 38 y.o. male with no significant past medical history who originally presented to [facility] overnight on 5/22 with complaint of worsening nausea, vomiting, right upper quadrant pain x about 5 days.  Patient had eaten a meal containing beef after which he developed symptoms.  Subsequently developed fevers and chills.  No fevers on presentation.  Labs revealed WBC count of 23 K, procalcitonin of 85, lactic acid of 3, creatinine of 2.8, total bilirubin of 4.3, mild AST/ALT elevations. Abdominal CT was concerning for perforated liver abscess with resultant peritonitis.  He was started on empiric vancomycin, cefepime and Flagyl.  Patient was transferred to [facility] for surgical evaluation.  He was taken to the OR for diagnostic laparoscopy converted to exploratory laparotomy with drainage of hepatic abscess and four-quadrant peritoneal lavage.  Intraoperative findings included extensive four-quadrant purulent peritonitis with near frozen abdomen due to inflammation, perforated right hepatic abscess.  Drains were placed.  ID service is consulted for further antibiotic recommendations.  As per chart review, no recent travel history, sick contacts, or ingestion of undercooked food.  No fevers currently.  Not on pressor support.    REVIEW OF SYSTEMS:  Patient cannot provide review of systems as is currently sedated on ventilator.    PAST MEDICAL HISTORY:  No past medical history on file.  No past surgical history on file.    FAMILY HISTORY:  Non-contributory    SOCIAL HISTORY:  Social History     Substance and Sexual Activity   Alcohol Use Not Currently     Social History     Substance and Sexual Activity   Drug Use Never     Social History     Tobacco Use   Smoking Status Former    Types: Cigarettes   Smokeless Tobacco  Never       ALLERGIES:  No Known Allergies    MEDICATIONS:  All current active medications have been reviewed.    PHYSICAL EXAM:  Vitals:  Temp:  [97.8 °F (36.6 °C)-98.2 °F (36.8 °C)] 98.1 °F (36.7 °C)  HR:  [115-123] 118  Resp:  [22-44] 22  BP: (107-128)/(59-75) 112/60  SpO2:  [94 %-97 %] 96 %  Temp (24hrs), Av °F (36.7 °C), Min:97.8 °F (36.6 °C), Max:98.2 °F (36.8 °C)  Current: Temperature: 98.1 °F (36.7 °C)     Physical Exam:  General: Sedated on ventilator  Eyes:  Conjunctive clear with no hemorrhages or effusions  Oropharynx: ET tube in place  Neck:  Supple, trachea midline  Lungs:  Clear to auscultation bilaterally, no accessory muscle use  Cardiac:  Regular rate and rhythm  Abdomen: Midline abdominal dressing is intact.  SYMONE drains x 4 in place with sanguinous output in bulbs.  Extremities:  No peripheral cyanosis, clubbing, or edema  Skin:  No diffuse rashes  Neurological: Sedated on ventilator    LABS, IMAGING, & OTHER STUDIES:  Lab Results:  I have personally reviewed pertinent labs.  Results from last 7 days   Lab Units 24  1339 24  1254 24  1208 24  0928 24  0146   POTASSIUM mmol/L  --   --   --  3.3* 3.3*   CHLORIDE mmol/L  --   --   --  96 91*   CO2 mmol/L  --   --   --  19* 22   CO2, I-STAT mmol/L 19* 20* 23  --   --    BUN mg/dL  --   --   --  70* 67*   CREATININE mg/dL  --   --   --  2.00* 2.82*   EGFR ml/min/1.73sq m  --   --   --  40 26   GLUCOSE, ISTAT mg/dl 125 134 130  --   --    CALCIUM mg/dL  --   --   --  6.8* 8.3*   AST U/L  --   --   --  62* 77*   ALT U/L  --   --   --  81* 104*   ALK PHOS U/L  --   --   --  94 110*     Results from last 7 days   Lab Units 24  1440 24  1339 24  1254 24  1208 24  0749 24  0146   WBC Thousand/uL 21.29*  --   --   --  20.17* 23.03*   HEMOGLOBIN g/dL 11.7*  --   --   --  12.9 14.4   I STAT HEMOGLOBIN g/dl  --  10.9* 12.2   < >  --   --    PLATELETS Thousands/uL 254  --   --   --  265 284    <  > = values in this interval not displayed.     Results from last 7 days   Lab Units 05/22/24  0835 05/22/24  0815 05/22/24  0210 05/22/24  0150   BLOOD CULTURE  Received in Microbiology Lab. Culture in Progress. Received in Microbiology Lab. Culture in Progress. Received in Microbiology Lab. Culture in Progress. Received in Microbiology Lab. Culture in Progress.       Imaging Studies:   I have personally reviewed pertinent imaging study reports and images in PACS.    Chest x-ray with no acute consolidation.    CT chest/abdomen/pelvis, personally reviewed, with findings concerning for perforated liver abscess with resultant peritonitis.    Right upper quadrant ultrasound with gas containing right hepatic lobe lesion consistent with lung abscess.  Small volume ascites.  Pronounced hepatomegaly.  12 mm right hepatic lobe focus potentially a small hemangioma.    EKG, Pathology, and Other Studies:   I have personally reviewed pertinent reports.    Operative report was personally reviewed.

## 2024-05-22 NOTE — PLAN OF CARE
Problem: Prexisting or High Potential for Compromised Skin Integrity  Goal: Skin integrity is maintained or improved  Description: INTERVENTIONS:  - Identify patients at risk for skin breakdown  - Assess and monitor skin integrity  - Assess and monitor nutrition and hydration status  - Monitor labs   - Assess for incontinence   - Turn and reposition patient  - Assist with mobility/ambulation  - Relieve pressure over bony prominences  - Avoid friction and shearing  - Provide appropriate hygiene as needed including keeping skin clean and dry  - Evaluate need for skin moisturizer/barrier cream  - Collaborate with interdisciplinary team   - Patient/family teaching  - Consider wound care consult   Outcome: Progressing     Problem: PAIN - ADULT  Goal: Verbalizes/displays adequate comfort level or baseline comfort level  Description: Interventions:  - Encourage patient to monitor pain and request assistance  - Assess pain using appropriate pain scale  - Administer analgesics based on type and severity of pain and evaluate response  - Implement non-pharmacological measures as appropriate and evaluate response  - Consider cultural and social influences on pain and pain management  - Notify physician/advanced practitioner if interventions unsuccessful or patient reports new pain  Outcome: Progressing     Problem: INFECTION - ADULT  Goal: Absence or prevention of progression during hospitalization  Description: INTERVENTIONS:  - Assess and monitor for signs and symptoms of infection  - Monitor lab/diagnostic results  - Monitor all insertion sites, i.e. indwelling lines, tubes, and drains  - Monitor endotracheal if appropriate and nasal secretions for changes in amount and color  - [place] appropriate cooling/warming therapies per order  - Administer medications as ordered  - Instruct and encourage patient and family to use good hand hygiene technique  - Identify and instruct in appropriate isolation precautions for  identified infection/condition  Outcome: Progressing  Goal: Absence of fever/infection during neutropenic period  Description: INTERVENTIONS:  - Monitor WBC    Outcome: Progressing     Problem: SAFETY ADULT  Goal: Patient will remain free of falls  Description: INTERVENTIONS:  - Educate patient/family on patient safety including physical limitations  - Instruct patient to call for assistance with activity   - Consult OT/PT to assist with strengthening/mobility   - Keep Call bell within reach  - Keep bed low and locked with side rails adjusted as appropriate  - Keep care items and personal belongings within reach  - Initiate and maintain comfort rounds  - Make Fall Risk Sign visible to staff  - Initiate/Maintain bed alarm  - Obtain necessary fall risk management equipment:   - Apply yellow socks and bracelet for high fall risk patients  - Consider moving patient to room near nurses station  Outcome: Progressing  Goal: Maintain or return to baseline ADL function  Description: INTERVENTIONS:  -  Assess patient's ability to carry out ADLs; assess patient's baseline for ADL function and identify physical deficits which impact ability to perform ADLs (bathing, care of mouth/teeth, toileting, grooming, dressing, etc.)  - Assess/evaluate cause of self-care deficits   - Assess range of motion  - Assess patient's mobility; develop plan if impaired  - Assess patient's need for assistive devices and provide as appropriate  - Encourage maximum independence but intervene and supervise when necessary  - Involve family in performance of ADLs  - Assess for home care needs following discharge   - Consider OT consult to assist with ADL evaluation and planning for discharge  - Provide patient education as appropriate  Outcome: Progressing  Goal: Maintains/Returns to pre admission functional level  Description: INTERVENTIONS:  - Perform AM-PAC 6 Click Basic Mobility/ Daily Activity assessment daily.  - Set and communicate daily mobility  goal to care team and patient/family/caregiver.   - Collaborate with rehabilitation services on mobility goals if consulted  - Reposition patient every 2 hours.  - Out of bed for toileting  - Record patient progress and toleration of activity level   Outcome: Progressing     Problem: DISCHARGE PLANNING  Goal: Discharge to home or other facility with appropriate resources  Description: INTERVENTIONS:  - Identify barriers to discharge w/patient and caregiver  - Arrange for needed discharge resources and transportation as appropriate  - Identify discharge learning needs (meds, wound care, etc.)  - Arrange for interpretive services to assist at discharge as needed  - Refer to Case Management Department for coordinating discharge planning if the patient needs post-hospital services based on physician/advanced practitioner order or complex needs related to functional status, cognitive ability, or social support system  Outcome: Progressing     Problem: Nutrition/Hydration-ADULT  Goal: Nutrient/Hydration intake appropriate for improving, restoring or maintaining nutritional needs  Description: Monitor and assess patient's nutrition/hydration status for malnutrition. Collaborate with interdisciplinary team and initiate plan and interventions as ordered.  Monitor patient's weight and dietary intake as ordered or per policy. Utilize nutrition screening tool and intervene as necessary. Determine patient's food preferences and provide high-protein, high-caloric foods as appropriate.     INTERVENTIONS:  - Monitor oral intake, urinary output, labs, and treatment plans  - Assess nutrition and hydration status and recommend course of action  - Evaluate amount of meals eaten  - Assist patient with eating if necessary   - Allow adequate time for meals  - Recommend/ encourage appropriate diets, oral nutritional supplements, and vitamin/mineral supplements  - Order, calculate, and assess calorie counts as needed  - Recommend, monitor,  and adjust tube feedings and TPN/PPN based on assessed needs  - Assess need for intravenous fluids  - Provide specific nutrition/hydration education as appropriate  - Include patient/family/caregiver in decisions related to nutrition  Outcome: Progressing

## 2024-05-22 NOTE — ANESTHESIA PREPROCEDURE EVALUATION
Procedure:  DIAGNOSTIC LAPAROSCOPY, WASHOUT, DRAIN PLACEMENT (Abdomen)    Relevant Problems   ANESTHESIA (within normal limits)      GI/HEPATIC   (+) Liver abscess        Physical Exam    Airway    Mallampati score: III  TM Distance: >3 FB  Neck ROM: full     Dental        Cardiovascular  Rhythm: regular, Rate: abnormal    Pulmonary   Decreased breath sounds    Other Findings        Anesthesia Plan  ASA Score- 3 Emergent    Anesthesia Type- general with ASA Monitors.         Additional Monitors: arterial line and central venous line.    Airway Plan: ETT.           Plan Factors-Exercise tolerance (METS): <4 METS.    Chart reviewed.   Existing labs reviewed. Patient summary reviewed.    Patient is not a current smoker.              Induction- intravenous.    Postoperative Plan- Plan for postoperative opioid use. Planned trial extubation    Perioperative Resuscitation Plan - Level 1 - Full Code.       Informed Consent- Anesthetic plan and risks discussed with patient.  I personally reviewed this patient with the CRNA. Discussed and agreed on the Anesthesia Plan with the CRNA..

## 2024-05-22 NOTE — ANESTHESIA PROCEDURE NOTES
"Arterial Line Insertion    Performed by: Elizabeth Xiao CRNA  Authorized by: Queta Aldana MD  Consent: Verbal consent obtained. Written consent obtained.  Risks and benefits: risks, benefits and alternatives were discussed  Patient understanding: patient states understanding of the procedure being performed  Patient consent: the patient's understanding of the procedure matches consent given  Procedure consent: procedure consent matches procedure scheduled  Relevant documents: relevant documents present and verified  Test results: test results available and properly labeled  Site marked: the operative site was marked  Radiology Images: Radiology Images displayed and confirmed. If images not available, report reviewed  Required items: required blood products, implants, devices, and special equipment available  Patient identity confirmed: arm band  Time out: Immediately prior to procedure a \"time out\" was called to verify the correct patient, procedure, equipment, support staff and site/side marked as required.  Preparation: Patient was prepped and draped in the usual sterile fashion.  Indications: hemodynamic monitoring  Orientation:  Right  Location: radial artery  Procedure Details:  Maurice's test normal: yes  Needle gauge: 20  Number of attempts: 1    Post-procedure:  Post-procedure: dressing applied  Waveform: good waveform  Post-procedure CNS: unchanged  Patient tolerance: Patient tolerated the procedure well with no immediate complications          "

## 2024-05-22 NOTE — RESPIRATORY THERAPY NOTE
"RT Protocol Note  Diogenes Mullins 38 y.o. male MRN: [id number]  [facility]: [id number]    Assessment    Principal Problem:    Liver abscess      Home Pulmonary Medications:  NA       No past medical history on file.  Social History     Socioeconomic History    Marital status: Single     Spouse name: Not on file    Number of children: Not on file    Years of education: Not on file    Highest education level: Not on file   Occupational History    Not on file   Tobacco Use    Smoking status: Former     Types: Cigarettes    Smokeless tobacco: Never   Vaping Use    Vaping status: Former   Substance and Sexual Activity    Alcohol use: Not Currently    Drug use: Never    Sexual activity: Not Currently   Other Topics Concern    Not on file   Social History Narrative    Not on file     Social Determinants of Health     Financial Resource Strain: Not on file   Food Insecurity: Not on file   Transportation Needs: Not on file   Physical Activity: Not on file   Stress: Not on file   Social Connections: Not on file   Intimate Partner Violence: Not on file   Housing Stability: Not on file       Subjective         Objective    Physical Exam:   Assessment Type: Assess only  General Appearance: Sedated  Respiratory Pattern: Assisted  Chest Assessment: Chest expansion symmetrical  Bilateral Breath Sounds: Clear, Diminished  Suction: ET Tube  O2 Device: vent    Vitals:  Blood pressure 112/60, pulse (!) 118, temperature 98.1 °F (36.7 °C), temperature source Oral, resp. rate 22, height 5' 10\" (1.778 m), weight 114 kg (250 lb 10.6 oz), SpO2 96%.    Results from last 7 days   Lab Units 05/22/24  0859   ANTONIO TEST  No       Imaging and other studies:     O2 Device: vent     Plan    Respiratory Plan: Vent/NIV/HFNC  Airway Clearance Plan: Incentive Spirometer, Flutter     Resp Comments: received pt back from OR intubated with 8.0/23 @ lip. bilateral BS clear/diminshed. Placed pt on documented CMV settings.ETT secured. Will " D/C flutter as pt was ordered prior to OR, will order vest via bed instead.

## 2024-05-22 NOTE — CASE MANAGEMENT
Case Management Assessment & Discharge Planning Note    Patient name Diogenes Mullins  Location OR POOL/OR POOL MRN [id number]  : 1985 Date 2024       Current Admission Date: 2024  Current Admission Diagnosis:Liver abscess  Patient Active Problem List    Diagnosis Date Noted Date Diagnosed    Liver abscess 2024       LOS (days): 0  Geometric Mean LOS (GMLOS) (days):   Days to GMLOS:     OBJECTIVE:    Risk of Unplanned Readmission Score: 14.52         Current admission status: Inpatient       Preferred Pharmacy:   PATIENT/FAMILY REPORTS NO PREFERRED PHARMACY  No address on file      Primary Care Provider: No primary care provider on file.    Primary Insurance:   Secondary Insurance:     ASSESSMENT:  Active Health Care Proxies    There are no active Health Care Proxies on file.                 Readmission Root Cause  30 Day Readmission: No    Patient Information  Admitted from:: Home  Mental Status: Alert  During Assessment patient was accompanied by: Not accompanied during assessment  Primary Caregiver: Self    Activities of Daily Living Prior to Admission  Functional Status: Independent         Patient Information Continued  Income Source: Employed       DISCHARGE DETAILS:     Pt currently in OR. This CM called pt's mother Pauline [phone] to introduce self & CM role.  Per pt's mother she is on her way to [place].  She lives in [place] and she is on her way to the hospital.  Will f/u in AM w/pt & pt's mother.

## 2024-05-22 NOTE — RESPIRATORY THERAPY NOTE
05/22/24 0806   Respiratory Protocol   Protocol Initiated? Yes   Protocol Selection Airway Clearance   Language Barrier? No   Medical & Social History Reviewed? Yes   Diagnostic Studies Reviewed? Yes   Physical Assessment Performed? Yes   Airway Clearance Plan Incentive Spirometer;Flutter   Respiratory Assessment   Assessment Type Assess only   General Appearance Alert;Awake   Respiratory Pattern Shallow;Tachypneic   Chest Assessment Chest expansion symmetrical   Bilateral Breath Sounds Clear;Diminished   Resp Comments Pt has no known pulmonary hx, no know home pulmonary meds. Per xray/CT scan, pt has trace bilateral effusions and bibasiliar atelectasis. Pt has lg amt of ascited. Pt tachypniec & shallow.. Started pt on IS and flutter at this time.   O2 Device ra   Additional Assessments   Pulse (!) 120   Respirations (!) 34   SpO2 96 %   Position Semi-Gaitan's

## 2024-05-22 NOTE — ED NOTES
Report given to Earnest in [facility]     Ioana Gray RN  05/22/24 9409

## 2024-05-22 NOTE — RESPIRATORY THERAPY NOTE
"RT Protocol Note  Diogenes Mullins 38 y.o. male MRN: [id number]  [facility]: [id number]    Assessment    Principal Problem:    Liver abscess      Home Pulmonary Medications:  NA       No past medical history on file.  Social History     Socioeconomic History    Marital status: Single     Spouse name: Not on file    Number of children: Not on file    Years of education: Not on file    Highest education level: Not on file   Occupational History    Not on file   Tobacco Use    Smoking status: Former     Types: Cigarettes    Smokeless tobacco: Never   Vaping Use    Vaping status: Former   Substance and Sexual Activity    Alcohol use: Not Currently    Drug use: Never    Sexual activity: Not Currently   Other Topics Concern    Not on file   Social History Narrative    Not on file     Social Determinants of Health     Financial Resource Strain: Not on file   Food Insecurity: Not on file   Transportation Needs: Not on file   Physical Activity: Not on file   Stress: Not on file   Social Connections: Not on file   Intimate Partner Violence: Not on file   Housing Stability: Not on file       Subjective         Objective    Physical Exam:   Assessment Type: Assess only  General Appearance: Sedated  Respiratory Pattern: Assisted  Chest Assessment: Chest expansion symmetrical  Bilateral Breath Sounds: Clear, Diminished  Suction: ET Tube  O2 Device: vent    Vitals:  Blood pressure 112/60, pulse (!) 118, temperature 98.1 °F (36.7 °C), temperature source Oral, resp. rate 22, height 5' 10\" (1.778 m), weight 114 kg (250 lb 10.6 oz), SpO2 96%.    Results from last 7 days   Lab Units 05/22/24  0859   ANTONIO TEST  No       Imaging and other studies:     O2 Device: vent     Plan    Respiratory Plan: Vent/NIV/HFNC  Airway Clearance Plan: Incentive Spirometer, Flutter     Resp Comments: received pt back from OR intubated with 8.0/23 @ lip. bilateral BS clear/diminshed. Placed pt on documented CMV settings.ETT secured. Will " D/C flutter as pt was ordered prior to OR, will order vest via bed instead.

## 2024-05-22 NOTE — PROGRESS NOTES
Vancomycin IV Pharmacy-to-Dose Consultation    Diogenes Mullins is a 38 y.o. male who is currently receiving Vancomycin IV with management by the Pharmacy Consult service.    Relevant clinical data and objective / subjective history reviewed.      Vancomycin Assessment:  Indication: Other Liver abscess  Status: critically ill  Micro: NGTD  Procalcitonin: 88.78 on 5/22  Renal Function:     Lab Results   Component Value Date    CREATININE 2.00 (H) 05/22/2024     Estimated Creatinine Clearance: 62.7 mL/min (A) (by C-G formula based on SCr of 2 mg/dL (H)).  Dialysis: no  Days of Therapy: 1   Current Dose: 1750 mg IV once   Goal AUC / Trough: -600, trough >10   Last Level: none  Model Fit:  N/A  Assessment: WBC elevated, afebrile.       Vancomycin Plan:  New Dosing: change to 1250 mg IV daily as needed for level less than 15   Predicted Trough / AUC: N/A  Next Level: on 5/23 at 0600  Renal Function Monitoring: Daily BMP and UOP      Pharmacy will continue to follow closely for s/sx of nephrotoxicity, infusion reactions and appropriateness of therapy.  BMP and CBC will be ordered per protocol. We will continue to follow the patient’s culture results and clinical progress daily.       Steve Azevedo, PharmD, BCCCP  Critical Care Clinical Pharmacist  Available via [employer]

## 2024-05-22 NOTE — OP NOTE
OPERATIVE REPORT  PATIENT NAME: Diogenes Mullins    :  1985  MRN: [id number]  Pt Location: [facility] OR ROOM 03    SURGERY DATE: 2024    Surgeons and Role:     * Isauro Clifton DO - Primary     * Kerri Oglesby MD - Assisting     * Cory Xiong MD - Assisting    Preop Diagnosis:  Liver abscess [K75.0]    Post-Op Diagnosis Codes:     * Liver abscess [K75.0]    Procedure(s):  DIAGNOSTIC LAPAROSCOPY CONVERTED TO EXPLORATORY LAPAROTOMY. DRAINAGE OF HEPATIC ABSCESS. FOUR QUADRANT PERITONEAL LAVAGE. PLACEMENT OF DRAINS.    Specimen(s):  ID Type Source Tests Collected by Time Destination   1 : peritoneal abscess for aerobic ,anaerobic, fungal and cytology Body Fluid Abdominal NON-GYNECOLOGIC CYTOLOGY, ANAEROBIC CULTURE AND GRAM STAIN, FUNGAL CULTURE, BODY FLUID CULTURE AND GRAM STAIN Isauro Clifton DO 2024 1203      Estimated Blood Loss:   75 mL    Drains:  Closed/Suction Drain Abdomen Bulb 19 Fr. (Active)   Number of days: 0       Closed/Suction Drain Right Abdomen Bulb 19 Fr. (Active)   Number of days: 0       Closed/Suction Drain Right Other (Comment) Bulb 19 Fr. (Active)   Number of days: 0       Closed/Suction Drain Left  Bulb 19 Fr. (Active)   Number of days: 0       NG/OG/Enteral Tube Orogastric 18 Fr Center mouth (Active)   Number of days: 0       Urethral Catheter Latex 16 Fr. (Active)   Number of days: 0     Anesthesia Type:   General    Operative Indications:  Perforated liver abscess [K75.0]  Generalized peritonitis    Operative Findings:  Extensive four-quadrant purulent peritonitis with near-frozen abdomen due to inflammation  Perforated right hepatic abscess  s/p diagnostic laparoscopy converted to exploratory laparotomy with drainage of hepatic abscess, four quadrant peritoneal lavage, and placement of four 19Fr Derek drains (RUQ: suprahepatic, RLQ: Leiva's pouch, LLQ: pelvis, LUQ: splenic recess)           Complications:   None    Procedure and Technique:  After  obtaining informed written consent, the patient was brought back to the operating room and placed in the supine position on the operating room table with both arms extended.  Bilateral lower extremity SCDs were placed, preoperative antibiotics were administered, and the patient underwent uneventful induction of general anesthesia.  A Francis catheter was inserted, lines were inserted by anesthesia, the abdomen was prepped/draped in the usual sterile fashion, and a universal timeout was undertaken where the patient's identity and proposed procedure were confirmed by all in the room.    A small stab incision was made at Erazo's point and the Veress needle was used to gain entry into the abdominal cavity and, following a low initial opening pressure, pneumoperitoneum was established to 15mmHg.  0.5% Marcaine was infiltrated into the subcutaneous tissues, a supraumbilical incision was made, and the 5 mm Optiview trocar was used to gain entry into the abdominal cavity.  Initial inspection with the laparoscopic camera revealed extensive, copious four-quadrant purulent peritonitis with a thick inflammatory rind encompassing all visible structures. Following the infiltration of additional local anesthetic, for a total of 18cc, two additional 5mm trocars were inserted under direct visualization in the epigastrium and right mid abdomen.    We began by obtaining fluid for cytology/culture and then copiously irrigating the abdominal cavity with the laparoscopic suction .  Inspection of the liver revealed a perforated right hepatic abscess, please see the associated image of this.  We attempted to perform a peritoneal lavage of the abdominal cavity; however, due to the degree of intra-abdominal contamination and inability to separate any interloop abscesses, the decision was made to convert to a laparotomy in order to obtain more adequate source control.    An upper midline laparotomy incision was made connecting the  epigastric/supraumbilical trocar sites and the subcutaneous tissues were dissected with electrocautery. The linea alba was identified/incised, the peritoneum was opened for the length of our incision, and initial inspection from our laparotomy incision confirmed extensive interloop adhesions, a near frozen abdomen, and abundant purulence throughout the abdominal cavity.  The small intestine and colon were difficult to fully evaluate due to the degree of generalized inflammation; however, palpation of the sigmoid colon did not suggest any large indwelling neoplasm or obvious perforation. We instilled numerous liters of warmed normal saline until the effluent was relatively clear and gently debrided the underside of the abdominal wall with laparotomy pads to clear the adherent fibrinous exudate.     Following the confirmation of instrument/sponge/sharp counts, four 19 [place] Derek drains were placed through the right upper/lower quadrants to drain the suprahepatic space/Morison's pouch and through the left lower/upper quadrant to drain the splenic recess/pelvis, respectively.  The midline fascia was reapproximated with two 1. single-stranded PDS, beginning cephalad/caudally and meeting in the middle.  The midline wound was irrigated, the skin was loosely reapproximated with staples and packed with Betadine soaked Telfa earlene, a Mepilex was placed as a dressing, and the patient was transported back to MICU intubated to undergo further resuscitation prior to attempted extubation.    Dr. Clifton was present/scrubbed for the entire procedure.    Patient Disposition:  Critical Care Unit    This procedure was not performed to treat colon cancer through resection      SIGNATURE: Cory Xiong MD  DATE: May 22, 2024  TIME: 1:59 PM

## 2024-05-22 NOTE — CONSULTS
Consultation - General Surgery   Diogenes Mullins 38 y.o. male MRN: [id number]  [facility]: [id number]    Assessment & Plan     Assessment:  39yo M who presents with a perforated hepatic abscess.  On CT scan abscess measures 9 cm in the longest dimension.  Patient presented with sepsis, however he is now hemodynamically stable with administration of antibiotics, and without the need for pressors.  He denies any history consistent with biliary pathology, appendicitis, or any other enteric illnesses  .  Plan:  -Recommend IR consult for drainage of hepatic abscess  - Recommend RUQ US to rule out possible gallbladder pathology  - Continue broad-spectrum antibiotics  - Agree with additional ICU care  - Monitor WBC and fever curve    History of Present Illness     HPI:  Diogenes Mullins is a 38 y.o. male with no apparent past medical history, who presents with 5 days of abdominal pain.  Patient states that approximately 5 days ago he ate a beef meal, resulting in some mild abdominal discomfort.  Symptoms were associated with profuse nausea, vomiting, diarrhea.  Approximately 2 days later he began having intense RUQ abdominal pain with worsening nausea, as well as chest pain, diaphoresis, dyspnea.  He presented to the ED yesterday, with CT findings concerning for a perforated liver abscess containing fluid and gas measuring 9.3 x 2.1 x 7.4 cm in the right hepatic lobe.  He was admitted to the ICU due to concerns of sepsis including tachycardia, tachypnea, SHAYY with creatinine of 2.8, WBC of 23, downtrending lactate of 2.2, and T. bili of 4.3.  On exam his abdomen is mild to moderately tender in the RUQ.  Patient denies any abdominal symptoms prior to 5 days ago, and states that he is in good health.  Denies any sick contacts, recent travel, or ingestion of raw foods.    Inpatient consult to Acute Care Surgery  Consult performed by: Maria E Rojo MD  Consult ordered by: Myra Majano  MD          Review of Systems   All other systems reviewed and are negative.      Historical Information   No past medical history on file.  No past surgical history on file.  Social History   Social History     Substance and Sexual Activity   Alcohol Use Not Currently     Social History     Substance and Sexual Activity   Drug Use Never     E-Cigarette/Vaping    E-Cigarette Use Former User      E-Cigarette/Vaping Substances     Social History     Tobacco Use   Smoking Status Former    Types: Cigarettes   Smokeless Tobacco Never     Family History: No family history on file.    Meds/Allergies   current meds:   Current Facility-Administered Medications   Medication Dose Route Frequency    acetaminophen (Ofirmev) injection 1,000 mg  1,000 mg Intravenous Q6H PRN    ceFEPime (MAXIPIME) 2,000 mg in dextrose 5 % 50 mL IVPB  2,000 mg Intravenous Q12H    chlorhexidine (PERIDEX) 0.12 % oral rinse 15 mL  15 mL Mouth/Throat Q12H PRO    Famotidine (PF) (PEPCID) injection 20 mg  20 mg Intravenous Q24H PRO    heparin (porcine) subcutaneous injection 5,000 Units  5,000 Units Subcutaneous Q8H PRO    HYDROmorphone (DILAUDID) injection 0.5 mg  0.5 mg Intravenous Once    HYDROmorphone HCl (DILAUDID) injection 0.2 mg  0.2 mg Intravenous Q2H PRN    methocarbamol (ROBAXIN) tablet 750 mg  750 mg Oral Q6H PRO    metroNIDAZOLE (FLAGYL) IVPB (premix) 500 mg 100 mL  500 mg Intravenous Q8H    multi-electrolyte (PLASMALYTE-A/ISOLYTE-S PH 7.4) IV solution  150 mL/hr Intravenous Continuous    naloxone (NARCAN) 0.04 mg/mL syringe 0.04 mg  0.04 mg Intravenous Q1MIN PRN    ondansetron (ZOFRAN) injection 4 mg  4 mg Intravenous Q4H PRN    oxyCODONE (ROXICODONE) split tablet 2.5 mg  2.5 mg Oral Q4H PRN    Or    oxyCODONE (ROXICODONE) IR tablet 5 mg  5 mg Oral Q4H PRN    senna-docusate sodium (SENOKOT S) 8.6-50 mg per tablet 2 tablet  2 tablet Oral BID    vancomycin (VANCOCIN) 1500 mg in sodium chloride 0.9% 250 mL IVPB  12.5 mg/kg Intravenous Q24H     "and PTA meds:   None     No Known Allergies    Objective   First Vitals:   Blood Pressure: 120/68 (05/22/24 0618)  Pulse: (!) 122 (05/22/24 0618)  Temperature: 98.2 °F (36.8 °C) (05/22/24 0618)  Temp Source: Oral (05/22/24 0618)  Respirations: (!) 34 (05/22/24 0618)  Height: 5' 10\" (177.8 cm) (05/22/24 0618)  Weight - Scale: 114 kg (250 lb 10.6 oz) (05/22/24 0618)  SpO2: 96 % (05/22/24 0618)    Current Vitals:   Blood Pressure: 122/64 (05/22/24 0700)  Pulse: (!) 118 (05/22/24 0700)  Temperature: 98.2 °F (36.8 °C) (05/22/24 0618)  Temp Source: Oral (05/22/24 0618)  Respirations: (!) 41 (05/22/24 0700)  Height: 5' 10\" (177.8 cm) (05/22/24 0618)  Weight - Scale: 114 kg (250 lb 10.6 oz) (05/22/24 0618)  SpO2: 96 % (05/22/24 0700)    No intake or output data in the 24 hours ending 05/22/24 0757    Invasive Devices       Peripheral Intravenous Line  Duration             Peripheral IV 05/22/24 Left Antecubital <1 day    Peripheral IV 05/22/24 Left;Upper;Ventral (anterior) Arm <1 day                    Physical Exam  Vitals reviewed.   Constitutional:       Appearance: He is diaphoretic.   HENT:      Head: Normocephalic.      Mouth/Throat:      Mouth: Mucous membranes are moist.   Eyes:      Pupils: Pupils are equal, round, and reactive to light.   Cardiovascular:      Rate and Rhythm: Tachycardia present.   Pulmonary:      Effort: Pulmonary effort is normal.   Abdominal:      General: Abdomen is flat.      Palpations: Abdomen is soft.      Comments: RUQ tenderness   Musculoskeletal:         General: Normal range of motion.      Cervical back: Normal range of motion.   Skin:     General: Skin is warm.      Capillary Refill: Capillary refill takes less than 2 seconds.      Coloration: Skin is jaundiced.   Neurological:      General: No focal deficit present.      Mental Status: He is alert and oriented to person, place, and time.         Lab Results: I have personally reviewed pertinent lab results.  , CBC:   Lab Results "   Component Value Date    WBC 20.17 (H) 05/22/2024    HGB 12.9 05/22/2024    HCT 36.7 05/22/2024    MCV 85 05/22/2024     05/22/2024    RBC 4.31 05/22/2024    MCH 29.9 05/22/2024    MCHC 35.1 05/22/2024    RDW 14.0 05/22/2024    MPV 10.9 05/22/2024   , CMP:   Lab Results   Component Value Date    SODIUM 131 (L) 05/22/2024    K 3.3 (L) 05/22/2024    CL 91 (L) 05/22/2024    CO2 22 05/22/2024    BUN 67 (H) 05/22/2024    CREATININE 2.82 (H) 05/22/2024    CALCIUM 8.3 (L) 05/22/2024    AST 77 (H) 05/22/2024     (H) 05/22/2024    ALKPHOS 110 (H) 05/22/2024    EGFR 26 05/22/2024   , Coagulation:   Lab Results   Component Value Date    INR 1.32 (H) 05/22/2024   , Urinalysis:   Lab Results   Component Value Date    COLORU Dark Yellow 05/22/2024    CLARITYU Extra Turbid 05/22/2024    SPECGRAV 1.025 05/22/2024    PHUR 5.0 05/22/2024    LEUKOCYTESUR Negative 05/22/2024    NITRITE Negative 05/22/2024    GLUCOSEU Negative 05/22/2024    KETONESU Negative 05/22/2024    BILIRUBINUR Small (A) 05/22/2024    BLOODU Trace (A) 05/22/2024     Imaging: I have personally reviewed pertinent reports.    CT chest abdomen pelvis wo contrast    Result Date: 5/22/2024  Impression: Findings are most concerning for perforated liver abscess with resultant peritonitis. I personally discussed this study with MOISES PERALES on 5/22/2024 4:05 AM. Workstation performed: [id number]      EKG, Pathology, and Other Studies: I have personally reviewed pertinent reports.      Counseling / Coordination of Care  Total floor / unit time spent today 30 minutes.  Greater than 50% of total time was spent with the patient and / or family counseling and / or coordination of care.  A description of the counseling / coordination of care: n/a.

## 2024-05-23 LAB
ALBUMIN SERPL BCP-MCNC: 2.1 G/DL (ref 3.5–5)
ALP SERPL-CCNC: 73 U/L (ref 34–104)
ALT SERPL W P-5'-P-CCNC: 75 U/L (ref 7–52)
ANION GAP SERPL CALCULATED.3IONS-SCNC: 10 MMOL/L (ref 4–13)
AST SERPL W P-5'-P-CCNC: 79 U/L (ref 13–39)
BACTERIA UR CULT: NORMAL
BASE EXCESS BLDA CALC-SCNC: -1.4 MMOL/L
BASE EXCESS BLDA CALC-SCNC: 0.8 MMOL/L
BILIRUB SERPL-MCNC: 2.11 MG/DL (ref 0.2–1)
BUN SERPL-MCNC: 53 MG/DL (ref 5–25)
CALCIUM ALBUM COR SERPL-MCNC: 8.5 MG/DL (ref 8.3–10.1)
CALCIUM SERPL-MCNC: 7 MG/DL (ref 8.4–10.2)
CHLORIDE SERPL-SCNC: 104 MMOL/L (ref 96–108)
CO2 SERPL-SCNC: 22 MMOL/L (ref 21–32)
CREAT SERPL-MCNC: 1.44 MG/DL (ref 0.6–1.3)
ERYTHROCYTE [DISTWIDTH] IN BLOOD BY AUTOMATED COUNT: 15 % (ref 11.6–15.1)
GFR SERPL CREATININE-BSD FRML MDRD: 60 ML/MIN/1.73SQ M
GLUCOSE SERPL-MCNC: 99 MG/DL (ref 65–140)
HCO3 BLDA-SCNC: 22.4 MMOL/L (ref 22–28)
HCO3 BLDA-SCNC: 24.1 MMOL/L (ref 22–28)
HCT VFR BLD AUTO: 30.8 % (ref 36.5–49.3)
HGB BLD-MCNC: 10.4 G/DL (ref 12–17)
INR PPP: 1.4 (ref 0.84–1.19)
MAGNESIUM SERPL-MCNC: 3 MG/DL (ref 1.9–2.7)
MCH RBC QN AUTO: 29.7 PG (ref 26.8–34.3)
MCHC RBC AUTO-ENTMCNC: 33.8 G/DL (ref 31.4–37.4)
MCV RBC AUTO: 88 FL (ref 82–98)
O2 CT BLDA-SCNC: 15.4 ML/DL (ref 16–23)
O2 CT BLDA-SCNC: 15.9 ML/DL (ref 16–23)
OXYHGB MFR BLDA: 97.7 % (ref 94–97)
OXYHGB MFR BLDA: 97.8 % (ref 94–97)
PCO2 BLDA: 34.1 MM HG (ref 36–44)
PCO2 BLDA: 34.3 MM HG (ref 36–44)
PH BLDA: 7.43 [PH] (ref 7.35–7.45)
PH BLDA: 7.47 [PH] (ref 7.35–7.45)
PHOSPHATE SERPL-MCNC: 3.8 MG/DL (ref 2.7–4.5)
PLATELET # BLD AUTO: 228 THOUSANDS/UL (ref 149–390)
PMV BLD AUTO: 10.7 FL (ref 8.9–12.7)
PO2 BLDA: 124.4 MM HG (ref 75–129)
PO2 BLDA: 132.1 MM HG (ref 75–129)
POTASSIUM SERPL-SCNC: 3.3 MMOL/L (ref 3.5–5.3)
PROT SERPL-MCNC: 4.6 G/DL (ref 6.4–8.4)
PROTHROMBIN TIME: 17 SECONDS (ref 11.6–14.5)
RBC # BLD AUTO: 3.5 MILLION/UL (ref 3.88–5.62)
SODIUM SERPL-SCNC: 136 MMOL/L (ref 135–147)
SPECIMEN SOURCE: ABNORMAL
SPECIMEN SOURCE: ABNORMAL
VANCOMYCIN SERPL-MCNC: 4.7 UG/ML (ref 10–20)
WBC # BLD AUTO: 16.59 THOUSAND/UL (ref 4.31–10.16)

## 2024-05-23 PROCEDURE — 82805 BLOOD GASES W/O2 SATURATION: CPT

## 2024-05-23 PROCEDURE — 85027 COMPLETE CBC AUTOMATED: CPT

## 2024-05-23 PROCEDURE — C9113 INJ PANTOPRAZOLE SODIUM, VIA: HCPCS | Performed by: STUDENT IN AN ORGANIZED HEALTH CARE EDUCATION/TRAINING PROGRAM

## 2024-05-23 PROCEDURE — 99233 SBSQ HOSP IP/OBS HIGH 50: CPT | Performed by: STUDENT IN AN ORGANIZED HEALTH CARE EDUCATION/TRAINING PROGRAM

## 2024-05-23 PROCEDURE — 94003 VENT MGMT INPAT SUBQ DAY: CPT

## 2024-05-23 PROCEDURE — 83735 ASSAY OF MAGNESIUM: CPT

## 2024-05-23 PROCEDURE — 85610 PROTHROMBIN TIME: CPT | Performed by: PHYSICIAN ASSISTANT

## 2024-05-23 PROCEDURE — 94760 N-INVAS EAR/PLS OXIMETRY 1: CPT

## 2024-05-23 PROCEDURE — 82805 BLOOD GASES W/O2 SATURATION: CPT | Performed by: PHYSICIAN ASSISTANT

## 2024-05-23 PROCEDURE — 84100 ASSAY OF PHOSPHORUS: CPT

## 2024-05-23 PROCEDURE — 99233 SBSQ HOSP IP/OBS HIGH 50: CPT | Performed by: INTERNAL MEDICINE

## 2024-05-23 PROCEDURE — 99024 POSTOP FOLLOW-UP VISIT: CPT | Performed by: SURGERY

## 2024-05-23 PROCEDURE — 80053 COMPREHEN METABOLIC PANEL: CPT

## 2024-05-23 PROCEDURE — 80202 ASSAY OF VANCOMYCIN: CPT | Performed by: STUDENT IN AN ORGANIZED HEALTH CARE EDUCATION/TRAINING PROGRAM

## 2024-05-23 RX ORDER — HYDROMORPHONE HCL/PF 1 MG/ML
1 SYRINGE (ML) INJECTION
Status: DISCONTINUED | OUTPATIENT
Start: 2024-05-23 | End: 2024-05-23

## 2024-05-23 RX ORDER — ACETAMINOPHEN 10 MG/ML
1000 INJECTION, SOLUTION INTRAVENOUS EVERY 6 HOURS PRN
Status: DISCONTINUED | OUTPATIENT
Start: 2024-05-23 | End: 2024-05-23

## 2024-05-23 RX ORDER — OXYCODONE HYDROCHLORIDE 5 MG/1
5 TABLET ORAL EVERY 4 HOURS PRN
Status: DISCONTINUED | OUTPATIENT
Start: 2024-05-23 | End: 2024-05-23

## 2024-05-23 RX ORDER — HYDROMORPHONE HCL/PF 1 MG/ML
1 SYRINGE (ML) INJECTION
Status: DISCONTINUED | OUTPATIENT
Start: 2024-05-23 | End: 2024-06-05 | Stop reason: HOSPADM

## 2024-05-23 RX ORDER — GABAPENTIN 100 MG/1
100 CAPSULE ORAL 3 TIMES DAILY
Status: DISCONTINUED | OUTPATIENT
Start: 2024-05-23 | End: 2024-06-05 | Stop reason: HOSPADM

## 2024-05-23 RX ORDER — HYDROMORPHONE HCL/PF 1 MG/ML
0.5 SYRINGE (ML) INJECTION
Status: DISCONTINUED | OUTPATIENT
Start: 2024-05-23 | End: 2024-05-23

## 2024-05-23 RX ORDER — OXYCODONE HYDROCHLORIDE 10 MG/1
10 TABLET ORAL EVERY 4 HOURS PRN
Status: DISCONTINUED | OUTPATIENT
Start: 2024-05-23 | End: 2024-06-05 | Stop reason: HOSPADM

## 2024-05-23 RX ORDER — ACETAMINOPHEN 325 MG/1
975 TABLET ORAL EVERY 8 HOURS SCHEDULED
Status: DISCONTINUED | OUTPATIENT
Start: 2024-05-23 | End: 2024-06-05 | Stop reason: HOSPADM

## 2024-05-23 RX ORDER — HYDROMORPHONE HCL IN WATER/PF 6 MG/30 ML
0.2 PATIENT CONTROLLED ANALGESIA SYRINGE INTRAVENOUS
Status: DISCONTINUED | OUTPATIENT
Start: 2024-05-23 | End: 2024-05-23

## 2024-05-23 RX ORDER — POTASSIUM CHLORIDE 20MEQ/15ML
40 LIQUID (ML) ORAL ONCE
Status: COMPLETED | OUTPATIENT
Start: 2024-05-23 | End: 2024-05-23

## 2024-05-23 RX ORDER — METHOCARBAMOL 500 MG/1
500 TABLET, FILM COATED ORAL EVERY 6 HOURS SCHEDULED
Status: DISCONTINUED | OUTPATIENT
Start: 2024-05-23 | End: 2024-05-31

## 2024-05-23 RX ORDER — POTASSIUM CHLORIDE 29.8 MG/ML
40 INJECTION INTRAVENOUS ONCE
Status: COMPLETED | OUTPATIENT
Start: 2024-05-23 | End: 2024-05-23

## 2024-05-23 RX ORDER — OXYCODONE HYDROCHLORIDE 5 MG/1
5 TABLET ORAL EVERY 4 HOURS PRN
Status: DISCONTINUED | OUTPATIENT
Start: 2024-05-23 | End: 2024-06-05 | Stop reason: HOSPADM

## 2024-05-23 RX ORDER — FENTANYL CITRATE 50 UG/ML
50 INJECTION, SOLUTION INTRAMUSCULAR; INTRAVENOUS
Status: DISCONTINUED | OUTPATIENT
Start: 2024-05-23 | End: 2024-05-23

## 2024-05-23 RX ADMIN — HYDROMORPHONE HYDROCHLORIDE 0.5 MG: 1 INJECTION, SOLUTION INTRAMUSCULAR; INTRAVENOUS; SUBCUTANEOUS at 10:45

## 2024-05-23 RX ADMIN — OXYCODONE HYDROCHLORIDE 10 MG: 10 TABLET ORAL at 18:46

## 2024-05-23 RX ADMIN — SODIUM CHLORIDE, SODIUM GLUCONATE, SODIUM ACETATE, POTASSIUM CHLORIDE, MAGNESIUM CHLORIDE, SODIUM PHOSPHATE, DIBASIC, AND POTASSIUM PHOSPHATE 150 ML/HR: .53; .5; .37; .037; .03; .012; .00082 INJECTION, SOLUTION INTRAVENOUS at 11:21

## 2024-05-23 RX ADMIN — HYDROMORPHONE HYDROCHLORIDE 1 MG: 1 INJECTION, SOLUTION INTRAMUSCULAR; INTRAVENOUS; SUBCUTANEOUS at 12:06

## 2024-05-23 RX ADMIN — HEPARIN SODIUM 5000 UNITS: 5000 INJECTION INTRAVENOUS; SUBCUTANEOUS at 05:22

## 2024-05-23 RX ADMIN — POTASSIUM CHLORIDE 40 MEQ: 1.5 SOLUTION ORAL at 06:23

## 2024-05-23 RX ADMIN — ACETAMINOPHEN 1000 MG: 10 INJECTION INTRAVENOUS at 00:46

## 2024-05-23 RX ADMIN — GABAPENTIN 100 MG: 100 CAPSULE ORAL at 20:03

## 2024-05-23 RX ADMIN — METHOCARBAMOL 500 MG: 500 TABLET ORAL at 19:53

## 2024-05-23 RX ADMIN — ACETAMINOPHEN 975 MG: 325 TABLET, FILM COATED ORAL at 14:18

## 2024-05-23 RX ADMIN — PROPOFOL 50 MCG/KG/MIN: 10 INJECTION, EMULSION INTRAVENOUS at 05:22

## 2024-05-23 RX ADMIN — POTASSIUM CHLORIDE 40 MEQ: 29.8 INJECTION, SOLUTION INTRAVENOUS at 06:24

## 2024-05-23 RX ADMIN — CEFEPIME 2000 MG: 2 INJECTION, POWDER, FOR SOLUTION INTRAVENOUS at 23:37

## 2024-05-23 RX ADMIN — ONDANSETRON 4 MG: 2 INJECTION INTRAMUSCULAR; INTRAVENOUS at 13:46

## 2024-05-23 RX ADMIN — HYDROMORPHONE HYDROCHLORIDE 1 MG: 1 INJECTION, SOLUTION INTRAMUSCULAR; INTRAVENOUS; SUBCUTANEOUS at 20:33

## 2024-05-23 RX ADMIN — HYDROMORPHONE HYDROCHLORIDE 0.2 MG: 0.2 INJECTION, SOLUTION INTRAMUSCULAR; INTRAVENOUS; SUBCUTANEOUS at 09:50

## 2024-05-23 RX ADMIN — HEPARIN SODIUM 5000 UNITS: 5000 INJECTION INTRAVENOUS; SUBCUTANEOUS at 13:52

## 2024-05-23 RX ADMIN — ACETAMINOPHEN 1000 MG: 10 INJECTION INTRAVENOUS at 09:15

## 2024-05-23 RX ADMIN — PROPOFOL 50 MCG/KG/MIN: 10 INJECTION, EMULSION INTRAVENOUS at 03:14

## 2024-05-23 RX ADMIN — METRONIDAZOLE 500 MG: 500 INJECTION, SOLUTION INTRAVENOUS at 19:53

## 2024-05-23 RX ADMIN — METHOCARBAMOL 500 MG: 500 TABLET ORAL at 23:37

## 2024-05-23 RX ADMIN — CEFEPIME 2000 MG: 2 INJECTION, POWDER, FOR SOLUTION INTRAVENOUS at 08:17

## 2024-05-23 RX ADMIN — METRONIDAZOLE 500 MG: 500 INJECTION, SOLUTION INTRAVENOUS at 03:14

## 2024-05-23 RX ADMIN — PANTOPRAZOLE SODIUM 40 MG: 40 INJECTION, POWDER, FOR SOLUTION INTRAVENOUS at 08:17

## 2024-05-23 RX ADMIN — OXYCODONE HYDROCHLORIDE 5 MG: 5 TABLET ORAL at 14:18

## 2024-05-23 RX ADMIN — PROPOFOL 50 MCG/KG/MIN: 10 INJECTION, EMULSION INTRAVENOUS at 00:22

## 2024-05-23 RX ADMIN — CEFEPIME 2000 MG: 2 INJECTION, POWDER, FOR SOLUTION INTRAVENOUS at 15:41

## 2024-05-23 RX ADMIN — ACETAMINOPHEN 975 MG: 325 TABLET, FILM COATED ORAL at 21:31

## 2024-05-23 RX ADMIN — VANCOMYCIN HYDROCHLORIDE 1250 MG: 10 INJECTION, POWDER, LYOPHILIZED, FOR SOLUTION INTRAVENOUS at 10:20

## 2024-05-23 RX ADMIN — HEPARIN SODIUM 5000 UNITS: 5000 INJECTION INTRAVENOUS; SUBCUTANEOUS at 21:31

## 2024-05-23 RX ADMIN — HYDROMORPHONE HYDROCHLORIDE 1 MG: 1 INJECTION, SOLUTION INTRAMUSCULAR; INTRAVENOUS; SUBCUTANEOUS at 16:30

## 2024-05-23 RX ADMIN — CHLORHEXIDINE GLUCONATE 15 ML: 1.2 SOLUTION ORAL at 08:17

## 2024-05-23 RX ADMIN — METRONIDAZOLE 500 MG: 500 INJECTION, SOLUTION INTRAVENOUS at 11:55

## 2024-05-23 NOTE — PLAN OF CARE
Problem: Prexisting or High Potential for Compromised Skin Integrity  Goal: Skin integrity is maintained or improved  Description: INTERVENTIONS:  - Identify patients at risk for skin breakdown  - Assess and monitor skin integrity  - Assess and monitor nutrition and hydration status  - Monitor labs   - Assess for incontinence   - Turn and reposition patient  - Assist with mobility/ambulation  - Relieve pressure over bony prominences  - Avoid friction and shearing  - Provide appropriate hygiene as needed including keeping skin clean and dry  - Evaluate need for skin moisturizer/barrier cream  - Collaborate with interdisciplinary team   - Patient/family teaching  - Consider wound care consult   Outcome: Progressing     Problem: PAIN - ADULT  Goal: Verbalizes/displays adequate comfort level or baseline comfort level  Description: Interventions:  - Encourage patient to monitor pain and request assistance  - Assess pain using appropriate pain scale  - Administer analgesics based on type and severity of pain and evaluate response  - Implement non-pharmacological measures as appropriate and evaluate response  - Consider cultural and social influences on pain and pain management  - Notify physician/advanced practitioner if interventions unsuccessful or patient reports new pain  Outcome: Progressing     Problem: INFECTION - ADULT  Goal: Absence or prevention of progression during hospitalization  Description: INTERVENTIONS:  - Assess and monitor for signs and symptoms of infection  - Monitor lab/diagnostic results  - Monitor all insertion sites, i.e. indwelling lines, tubes, and drains  - Monitor endotracheal if appropriate and nasal secretions for changes in amount and color  - [place] appropriate cooling/warming therapies per order  - Administer medications as ordered  - Instruct and encourage patient and family to use good hand hygiene technique  - Identify and instruct in appropriate isolation precautions for  identified infection/condition  Outcome: Progressing  Goal: Absence of fever/infection during neutropenic period  Description: INTERVENTIONS:  - Monitor WBC    Outcome: Progressing     Problem: SAFETY ADULT  Goal: Patient will remain free of falls  Description: INTERVENTIONS:  - Educate patient/family on patient safety including physical limitations  - Instruct patient to call for assistance with activity   - Consult OT/PT to assist with strengthening/mobility   - Keep Call bell within reach  - Keep bed low and locked with side rails adjusted as appropriate  - Keep care items and personal belongings within reach  - Initiate and maintain comfort rounds  - Make Fall Risk Sign visible to staff  - Offer Toileting every 2 Hours, in advance of need  - Initiate/Maintain bed/chair alarm  - Obtain necessary fall risk management equipment:   - Apply yellow socks and bracelet for high fall risk patients  - Consider moving patient to room near nurses station  Outcome: Progressing  Goal: Maintain or return to baseline ADL function  Description: INTERVENTIONS:  -  Assess patient's ability to carry out ADLs; assess patient's baseline for ADL function and identify physical deficits which impact ability to perform ADLs (bathing, care of mouth/teeth, toileting, grooming, dressing, etc.)  - Assess/evaluate cause of self-care deficits   - Assess range of motion  - Assess patient's mobility; develop plan if impaired  - Assess patient's need for assistive devices and provide as appropriate  - Encourage maximum independence but intervene and supervise when necessary  - Involve family in performance of ADLs  - Assess for home care needs following discharge   - Consider OT consult to assist with ADL evaluation and planning for discharge  - Provide patient education as appropriate  Outcome: Progressing  Goal: Maintains/Returns to pre admission functional level  Description: INTERVENTIONS:  - Perform AM-PAC 6 Click Basic Mobility/ Daily  Activity assessment daily.  - Set and communicate daily mobility goal to care team and patient/family/caregiver.   - Collaborate with rehabilitation services on mobility goals if consulted  - Reposition patient every 2 hours.  - Out of bed for toileting  - Record patient progress and toleration of activity level   Outcome: Progressing     Problem: DISCHARGE PLANNING  Goal: Discharge to home or other facility with appropriate resources  Description: INTERVENTIONS:  - Identify barriers to discharge w/patient and caregiver  - Arrange for needed discharge resources and transportation as appropriate  - Identify discharge learning needs (meds, wound care, etc.)  - Arrange for interpretive services to assist at discharge as needed  - Refer to Case Management Department for coordinating discharge planning if the patient needs post-hospital services based on physician/advanced practitioner order or complex needs related to functional status, cognitive ability, or social support system  Outcome: Progressing     Problem: Nutrition/Hydration-ADULT  Goal: Nutrient/Hydration intake appropriate for improving, restoring or maintaining nutritional needs  Description: Monitor and assess patient's nutrition/hydration status for malnutrition. Collaborate with interdisciplinary team and initiate plan and interventions as ordered.  Monitor patient's weight and dietary intake as ordered or per policy. Utilize nutrition screening tool and intervene as necessary. Determine patient's food preferences and provide high-protein, high-caloric foods as appropriate.     INTERVENTIONS:  - Monitor oral intake, urinary output, labs, and treatment plans  - Assess nutrition and hydration status and recommend course of action  - Evaluate amount of meals eaten  - Assist patient with eating if necessary   - Allow adequate time for meals  - Recommend/ encourage appropriate diets, oral nutritional supplements, and vitamin/mineral supplements  - Order,  calculate, and assess calorie counts as needed  - Recommend, monitor, and adjust tube feedings and TPN/PPN based on assessed needs  - Assess need for intravenous fluids  - Provide specific nutrition/hydration education as appropriate  - Include patient/family/caregiver in decisions related to nutrition  Outcome: Progressing

## 2024-05-23 NOTE — UTILIZATION REVIEW
Initial Clinical Review    Admission: Date/Time/Statement:   Admission Orders (From admission, onward)       Ordered        05/22/24 0657  Inpatient Admission  Once                          Orders Placed This Encounter   Procedures    Inpatient Admission     Standing Status:   Standing     Number of Occurrences:   1     Order Specific Question:   Level of Care     Answer:   Critical Care [15]     Order Specific Question:   Estimated length of stay     Answer:   More than 2 Midnights     Order Specific Question:   Certification     Answer:   I certify that inpatient services are medically necessary for this patient for a duration of greater than two midnights. See H&P and MD Progress Notes for additional information about the patient's course of treatment.     ED Arrival Information       Patient not seen in ED                       No chief complaint on file.      Initial Presentation: 38 y.o. male w/o PMH was transferred from [facility] to [facility] for a higher level of care.  Pt initially presented to [facility] w/ 1 week history of nausea, vomiting, right upper quadrant pain.     Pt's symptoms started when he cooked himself beef and then began to develop nausea and vomiting., then he developed dyspnea d/t chest spasms, fever and chills. He was a sepsis alert at [facility].   CT abdomen pelvis was concerning for perforated liver abscess with peritonitis.   Transported to [facility] for further evaluation management.   On arrival to [facility], pt aaox3, tachycardia, tachypnea, RUQ TTP w/ radiation to the R flank present.    Admit as Inpatient for evaluation and treatment of Perforated Hepatic Abscess w/ peritonitis, septic shock, SHAYY.  Plan: Continue IV antibiotics (cefepime Flagyl, vancomycin). Follow-up labs, lactic acidosis slowly decreasing from 3-2.2.  Multimodal analgesia. Gen surg consulted. Monitor I's and O's. Trend Bun/Cr. N.p.o. IVF.    Gen Surg Consult: Perforated hepatic abscess:   Plan: CT imaging reviewed. Do  not believe amount of fluid noticed in abd/pelvis will resolve with IR drainage. Will take to OR for washout and drainage of hepatic abscess.Continue broad-spectrum antibiotics     OP Note:  SURGERY DATE: 5/22/2024   Procedure(s):  DIAGNOSTIC LAPAROSCOPY CONVERTED TO EXPLORATORY LAPAROTOMY. DRAINAGE OF HEPATIC ABSCESS. FOUR QUADRANT PERITONEAL LAVAGE. PLACEMENT OF DRAINS.  Anesthesia Type: General  Operative Findings:  Extensive four-quadrant purulent peritonitis with near-frozen abdomen due to inflammation  Perforated right hepatic abscess  s/p diagnostic laparoscopy converted to exploratory laparotomy with drainage of hepatic abscess, four quadrant peritoneal lavage, and placement of four 19Fr Derek drains (RUQ: suprahepatic, RLQ: Leiva's pouch, LLQ: pelvis, LUQ: splenic recess)        ID Consult: Severe sepsis, Perforated liver abscess with purulent peritonitis.  Status post exploratory laparotomy with drainage of abscess and four-quadrant peritoneal lavage.  Intraoperative findings included extensive four-quadrant purulent peritonitis with near frozen abdomen due to inflammation. Operative cultures are pending. Continue empiric vancomycin/cefepime/Flagyl for now. Follow temperatures and hemodynamics. CBC in AM to assess treatment response. Follow-up operative cultures and tailor antibiotics accordingly. Follow-up pathology. Serial abdominal exams     CC Quick Notes; Pt retirned to ICU s/p ex lap for drainage of hepatic abscess, lavage, and drain placement (4 drains placed). currently intubated, mechanical ventilation, and sedated. Vital stable at this time.   Plan: Maintain MV and sedation on propofol fentanyl. Maps > 65, pressors and fluid bolus as needed. Follow-up postop labs. Isolyte at 150. Continue antibiotics, ID consulted. Monitor SYMONE drain x 4 output. Multimodal analgesia. Potentially plan for extubation later today or tomorrow.    ED Triage Vitals [05/22/24 0618]   Temperature Pulse Respirations  Blood Pressure SpO2   98.2 °F (36.8 °C) (!) 122 (!) 34 120/68 96 %      Temp Source Heart Rate Source Patient Position - Orthostatic VS BP Location FiO2 (%)   Oral Monitor Lying Right arm --      Pain Score       2          Wt Readings from Last 1 Encounters:   05/23/24 118 kg (260 lb 2.3 oz)     Additional Vital Signs:   Date/Time Temp Pulse Resp BP MAP (mmHg) Arterial Line BP 2 MAP SpO2 Calculated FIO2 (%) - Nasal Cannula Nasal Cannula O2 Flow Rate (L/min) O2 Device Patient Position - Orthostatic VS   05/23/24 0732 -- -- -- -- -- -- -- 96 % -- -- -- --   05/23/24 0700 100.9 °F (38.3 °C) Abnormal  122 Abnormal  20 99/58 71 103/51 68 mmHg 97 % -- -- -- --   05/23/24 0600 101.1 °F (38.4 °C) Abnormal  118 Abnormal  20 100/55 71 103/51 68 mmHg 97 % -- -- -- --   05/23/24 0500 100.9 °F (38.3 °C) Abnormal  114 Abnormal  19 98/54 69 98/49 64 mmHg 97 % -- -- -- --   05/23/24 0400 100.6 °F (38.1 °C) Abnormal  111 Abnormal  18 99/56 73 97/48 63 mmHg 97 % -- -- -- --   05/23/24 0325 -- -- -- -- -- -- -- 98 % -- -- -- --   05/23/24 0300 100.4 °F (38 °C) 110 Abnormal  19 95/52 65 94/47 62 mmHg 98 % -- -- -- --   05/23/24 0200 100.6 °F (38.1 °C) Abnormal  110 Abnormal  19 92/55 67 91/46 60 mmHg 98 % -- -- -- --   05/23/24 0100 100.6 °F (38.1 °C) Abnormal  116 Abnormal  20 89/55 Abnormal  67 94/48 62 mmHg 98 % -- -- -- --   05/23/24 0000 100.4 °F (38 °C) 114 Abnormal  20 93/52 66 95/48 63 mmHg 98 % -- -- -- --   05/22/24 2300 100.2 °F (37.9 °C) 112 Abnormal  19 90/53 66 93/48 61 mmHg 98 % -- -- -- --   05/22/24 2255 -- -- -- -- -- -- -- 99 % -- -- -- --   05/22/24 2200 100 °F (37.8 °C) 113 Abnormal  19 95/51 69 93/49 62 mmHg 98 % -- -- -- --   05/22/24 2100 100.2 °F (37.9 °C) 112 Abnormal  19 89/50 Abnormal  61 Abnormal  92/46 60 mmHg 98 % -- -- -- --   05/22/24 2000 100 °F (37.8 °C) 118 Abnormal  20 91/55 67 94/49 62 mmHg 99 % -- -- -- --   05/22/24 1900 99.9 °F (37.7 °C) 122 Abnormal  24 Abnormal  101/58 74 100/53 68 mmHg 99 %  -- -- -- --   05/22/24 1800 100.4 °F (38 °C) 122 Abnormal  22 101/56 73 102/53 68 mmHg 99 % -- -- -- --   05/22/24 1700 100.2 °F (37.9 °C) 126 Abnormal  26 Abnormal  101/60 76 117/61 79 mmHg 99 % -- -- -- --   05/22/24 1600 99.7 °F (37.6 °C) 124 Abnormal  23 Abnormal  97/52 72 113/56 73 mmHg 99 % -- -- Ventilator Lying   05/22/24 1500 99 °F (37.2 °C) 122 Abnormal  26 Abnormal  113/58 79 162/87 108 mmHg 99 % -- -- -- --   05/22/24 1430 83.3 °F (28.5 °C) Abnormal  118 Abnormal  22 -- -- -- -- 96 % -- -- -- --   05/22/24 1050 -- -- -- -- -- -- -- -- 32 3 L/min Nasal cannula --   05/22/24 1000 -- 120 Abnormal  31 Abnormal  112/60 80 -- -- 96 % -- -- -- --   05/22/24 0900 -- 119 Abnormal  36 Abnormal  -- -- -- -- 95 % -- -- -- --   05/22/24 0806 -- 120 Abnormal  34 Abnormal  -- -- -- -- 96 % -- -- -- --   05/22/24 0800 98.1 °F (36.7 °C) 122 Abnormal  40 Abnormal  -- -- -- -- 95 % 32 3 L/min Nasal cannula Lying   05/22/24 0700 -- 118 Abnormal  41 Abnormal  122/64 85 -- -- 96 % -- -- -- --       Pertinent Labs/Diagnostic Test Results:   US right upper quadrant   Final Result by Earnest Grimaldo MD (05/22 0955)      8.8 x 8.3 x 7.1 cm gas-containing right hepatic lobe lesion in keeping with previously identified lung abscess.      Small volume of ascites.      Pronounced hepatomegaly.      12 mm right hepatic lobe focus potentially a small hemangioma.      Workstation performed: [id number]         XR chest portable ICU    (Results Pending)     Results from last 7 days   Lab Units 05/22/24  0141   SARS-COV-2  Negative     Results from last 7 days   Lab Units 05/23/24  0435 05/22/24  1440 05/22/24  1339 05/22/24  1254 05/22/24  1208 05/22/24  0749   WBC Thousand/uL 16.59* 21.29*  --   --   --  20.17*   HEMOGLOBIN g/dL 10.4* 11.7*  --   --   --  12.9   I STAT HEMOGLOBIN g/dl  --   --  10.9* 12.2 12.2  --    HEMATOCRIT % 30.8* 34.1*  --   --   --  36.7   HEMATOCRIT, ISTAT %  --   --  32* 36* 36*  --    PLATELETS Thousands/uL  "228 254  --   --   --  265         Results from last 7 days   Lab Units 05/23/24  0435 05/22/24  1440 05/22/24  1339 05/22/24  1254 05/22/24  1208 05/22/24  0928 05/22/24  0146   SODIUM mmol/L 136 135  --   --   --  132* 131*   POTASSIUM mmol/L 3.3* 3.6  --   --   --  3.3* 3.3*   CHLORIDE mmol/L 104 103  --   --   --  96 91*   CO2 mmol/L 22 20*  --   --   --  19* 22   CO2, I-STAT mmol/L  --   --  19* 20* 23  --   --    ANION GAP mmol/L 10 12  --   --   --  17* 18*   BUN mg/dL 53* 61*  --   --   --  70* 67*   CREATININE mg/dL 1.44* 1.58*  --   --   --  2.00* 2.82*   EGFR ml/min/1.73sq m 60 54  --   --   --  40 26   CALCIUM mg/dL 7.0* 7.5*  --   --   --  6.8* 8.3*   CALCIUM, IONIZED mmol/L  --  1.08*  --   --   --   --   --    CALCIUM, IONIZED, ISTAT mmol/L  --   --  1.15 1.05* 0.93*  --   --    MAGNESIUM mg/dL 3.0* 2.3  --   --   --  2.4  --    PHOSPHORUS mg/dL 3.8 6.6*  --   --   --  5.7*  --      Results from last 7 days   Lab Units 05/23/24  0435 05/22/24  1512 05/22/24  1440 05/22/24  0928 05/22/24  0749 05/22/24  0146   AST U/L 79*  --  85*  85* 62*  --  77*   ALT U/L 75*  --  78*  78* 81*  --  104*   ALK PHOS U/L 73  --  77  77 94  --  110*   TOTAL PROTEIN g/dL 4.6*  --  4.6*  4.6* 5.4*  --  6.5   ALBUMIN g/dL 2.1*  --  2.3*  2.3* 2.3*  --  2.8*   TOTAL BILIRUBIN mg/dL 2.11*  --  2.52*  2.52* 3.17*  --  4.32*   BILIRUBIN DIRECT mg/dL  --   --  1.66*  --  0.92*  --    AMMONIA umol/L  --  89*  --   --   --   --          Results from last 7 days   Lab Units 05/23/24  0435 05/22/24  1440 05/22/24  0928 05/22/24  0146   GLUCOSE RANDOM mg/dL 99 117 127 115             No results found for: \"BETA-HYDROXYBUTYRATE\"   Results from last 7 days   Lab Units 05/23/24  0722 05/23/24  0435 05/22/24  2310   PH ART  7.467* 7.432 7.439   PCO2 ART mm Hg 34.1* 34.3* 34.3*   PO2 ART mm Hg 124.4 132.1* 127.6   HCO3 ART mmol/L 24.1 22.4 22.7   BASE EXC ART mmol/L 0.8 -1.4 -1.0   O2 CONTENT ART mL/dL 15.9* 15.4* 15.9*   O2 HGB, " ARTERIAL % 97.8* 97.7* 97.9*   ABG SOURCE  Line, Arterial Line, Arterial Line, Arterial     Results from last 7 days   Lab Units 05/22/24  0859 05/22/24  0234   PH HALLIE  7.396 7.300   PCO2 HALLIE mm Hg 30.4* 43.3   PO2 HALLIE mm Hg 66.2* 36.2   HCO3 HALLIE mmol/L 18.2* 20.8*   BASE EXC HALLIE mmol/L -5.4 -5.5   O2 CONTENT HALLIE ml/dL 17.3 13.3   O2 HGB, VENOUS % 90.2* 60.6     Results from last 7 days   Lab Units 05/22/24  1339 05/22/24  1254 05/22/24  1208   I STAT BASE EXC mmol/L -9* -6* -6*   I STAT O2 SAT % 99* 97* 98*   ISTAT PH ART  7.251* 7.314* 7.250*   I STAT ART PCO2 mm HG 40.8 38.1 48.8*   I STAT ART PO2 mm .0* 94.0 132.0*   I STAT ART HCO3 mmol/L 17.9* 19.3* 21.4*         Results from last 7 days   Lab Units 05/22/24  0431 05/22/24  0146   HS TNI 0HR ng/L  --  6   HS TNI 2HR ng/L 6  --    HSTNI D2 ng/L 0  --          Results from last 7 days   Lab Units 05/23/24  0435 05/22/24  0150   PROTIME seconds 17.0* 17.1*   INR  1.40* 1.32*   PTT seconds  --  25         Results from last 7 days   Lab Units 05/22/24  0150   PROCALCITONIN ng/ml 85.78*     Results from last 7 days   Lab Units 05/22/24  2310 05/22/24  1747 05/22/24  1440 05/22/24  0749 05/22/24  0433 05/22/24  0150   LACTIC ACID mmol/L 1.3 1.9 2.4* 1.6 2.2* 3.0*                                                 Results from last 7 days   Lab Units 05/22/24  0415   CLARITY UA  Extra Turbid   COLOR UA  Dark Yellow   SPEC GRAV UA  1.025   PH UA  5.0   GLUCOSE UA mg/dl Negative   KETONES UA mg/dl Negative   BLOOD UA  Trace*   PROTEIN UA mg/dl 50 (1+)*   NITRITE UA  Negative   BILIRUBIN UA  Small*   UROBILINOGEN UA (BE) mg/dl 4.0*   LEUKOCYTES UA  Negative   WBC UA /hpf 20-30*   RBC UA /hpf 4-10*   BACTERIA UA /hpf Occasional   EPITHELIAL CELLS WET PREP /hpf Occasional   MUCUS THREADS  Moderate*     Results from last 7 days   Lab Units 05/22/24  0141   INFLUENZA A PCR  Negative   INFLUENZA B PCR  Negative   RSV PCR  Negative                             Results from  last 7 days   Lab Units 05/22/24  1203 05/22/24  0835 05/22/24  0815 05/22/24  0210 05/22/24  0150   BLOOD CULTURE   --  Received in Microbiology Lab. Culture in Progress. Received in Microbiology Lab. Culture in Progress. Received in Microbiology Lab. Culture in Progress. Received in Microbiology Lab. Culture in Progress.   GRAM STAIN RESULT  4+ Polys*  4+ Gram negative rods*  1+ Gram positive cocci in pairs*  --   --   --   --              Results from last 7 days   Lab Units 05/23/24  0435   VANCOMYCIN RM ug/mL 4.7*       ED Treatment:   Medication Administration - No Administrations Displayed (No Start Event Found)       None          No past medical history on file.  Present on Admission:   Liver abscess      Admitting Diagnosis: Liver abscess  Age/Sex: 38 y.o. male  Admission Orders:  SCD  Cardio-Pulmonary Monitoring, Neuro Checks, Nursing dysphagia screen, I/O, Daily weights, Vital signs  NPO  Restraints non violent    Scheduled Medications:  cefepime, 2,000 mg, Intravenous, Q12H  chlorhexidine, 15 mL, Mouth/Throat, Q12H PRO  heparin (porcine), 5,000 Units, Subcutaneous, Q8H PRO  metroNIDAZOLE, 500 mg, Intravenous, Q8H  pantoprazole, 40 mg, Intravenous, Q24H PRO  potassium chloride, 40 mEq, Intravenous, Once      Continuous IV Infusions:  fentaNYL, 100 mcg/hr, Intravenous, Continuous  multi-electrolyte, 150 mL/hr, Intravenous, Continuous  norepinephrine, 1-30 mcg/min, Intravenous, Titrated  propofol, 5-50 mcg/kg/min, Intravenous, Titrated      PRN Meds:  acetaminophen, 1,000 mg, Intravenous, Q6H PRN  fentaNYL, 50 mcg, Intravenous, Q1H PRN  naloxone, 0.04 mg, Intravenous, Q1MIN PRN  ondansetron, 4 mg, Intravenous, Q4H PRN  vancomycin, 15 mg/kg (Adjusted), Intravenous, Daily PRN        IP CONSULT TO CASE MANAGEMENT  IP CONSULT TO INFECTIOUS DISEASES  IP CONSULT TO ACUTE CARE SURGERY  IP CONSULT TO PHARMACY    Network Utilization Review Department  ATTENTION: Please call with any questions or concerns to  [phone] and carefully listen to the prompts so that you are directed to the right person. All voicemails are confidential.   For Discharge needs, contact Care Management DC Support Team at [phone] opt. 2  Send all requests for admission clinical reviews, approved or denied determinations and any other requests to dedicated fax number below belonging to the campus where the patient is receiving treatment. List of dedicated fax numbers for the Facilities:  FACILITY NAME UR FAX NUMBER   ADMISSION DENIALS (Administrative/Medical Necessity) [phone]   DISCHARGE SUPPORT TEAM (NETWORK) [phone]   PARENT CHILD HEALTH (Maternity/NICU/Pediatrics) [phone]   [facility] [phone]   [facility] [phone]   [facility] [phone]   [facility] [phone]   Critical access hospital [phone]   [facility] [phone]   [facility] [phone]   [facility] [phone]   [facility] [phone]   [facility] [phone]   [facility] [phone]   [facility] [phone]

## 2024-05-23 NOTE — PROGRESS NOTES
"General Surgery  Progress Note   Diogenes Mullins 38 y.o. male MRN: [id number]  Unit/Bed#: MICU 14 Encounter: [id number]    Assessment:  38-year-old male with perforated hepatic abscess status post  diagnostic laparoscopy converted to exploratory laparotomy with drainage of hepatic abscess, four-quadrant peritoneal lavage 2/2 purulent peritonitis and drain placements.    Febrile to 101.1 F at 6 AM.  Persistent tachycardia.    Remains ventilated, P CMV 4/6/6/50%  Propofol 50, fentanyl 100    UOP: 2.3 L  LUQ SYMONE (splenic recess) 145 cc  LLQ SYMONE (pelvis) 160 cc  RUQ SYMONE (suprahepatic) 95 cc  RLQ SYMONE (subhepatic) 145 cc    WBC 16.59 (21.29)  Hgb 10.4 (11.7)  Cr 1.44 (1.58)    OR cultures: Gram-negative rods, gram-positive cocci in pairs (preliminary)    Plan:  Wean to extubate as able  Follow-up OR cultures  Continue cefepime, Flagyl, Vanc  ID following, appreciate recommendations  DVT ppx: Heparin  Pain/ nausea control PRN  OOB/ ambulation once extubated       Subjective/Objective     Subjective:   Patient seen and examined at bedside, in no acute distress. No acute events overnight. Patient understanding of discussions this morning and nods head yes or no to questions. Has improved abdominal pain since surgical intervention.    Objective:   Vitals:Blood pressure 100/55, pulse (!) 118, temperature (!) 101.1 °F (38.4 °C), resp. rate 20, height 5' 10\" (1.778 m), weight 118 kg (260 lb 2.3 oz), SpO2 97%.  Temp (24hrs), Av.2 °F (37.3 °C), Min:83.3 °F (28.5 °C), Max:101.1 °F (38.4 °C)        Intake/Output Summary (Last 24 hours) at 2024 0626  Last data filed at 2024 0601  Gross per 24 hour   Intake 5825.33 ml   Output 2980 ml   Net 2845.33 ml       Invasive Devices       Central Venous Catheter Line  Duration             CVC Central Lines 24 <1 day              Peripheral Intravenous Line  Duration             Peripheral IV 24 Left Antecubital 1 day    Peripheral IV 24 Dorsal (posterior);Right " Hand <1 day              Arterial Line  Duration             Arterial Line 05/22/24 Right Radial <1 day              Drain  Duration             Closed/Suction Drain LLQ Bulb 19 Fr. <1 day    Closed/Suction Drain LUQ Bulb 19 Fr. <1 day    Closed/Suction Drain Right RLQ Bulb 19 Fr. <1 day    Closed/Suction Drain Right RUQ Bulb 19 Fr. <1 day    NG/OG/Enteral Tube Orogastric 18 Fr Center mouth <1 day    Urethral Catheter Latex 16 Fr. <1 day              Airway  Duration             ETT  Cuffed;Oral;Pre-curved;Inflated 8 mm <1 day                    Physical Exam:  General: No acute distress, responsive with nodding head yes/no  HEENT: ETT/ OGT  CV: Well perfused, tachycardic   Lungs: mechanically ventilated   Abdomen: Soft, distended. Incision with mepilex overlying, some saturation from bedatine earlene. SYMONE x4 serous/ serosang appearing  Extremities: No edema, clubbing or cyanosis  Skin: Warm, dry    Lab Results: BMP/CMP:   Lab Results   Component Value Date    SODIUM 136 05/23/2024    K 3.3 (L) 05/23/2024     05/23/2024    CO2 22 05/23/2024    CO2 19 (L) 05/22/2024    BUN 53 (H) 05/23/2024    CREATININE 1.44 (H) 05/23/2024    GLUCOSE 125 05/22/2024    CALCIUM 7.0 (L) 05/23/2024    AST 79 (H) 05/23/2024    ALT 75 (H) 05/23/2024    ALKPHOS 73 05/23/2024    EGFR 60 05/23/2024    and CBC:   Lab Results   Component Value Date    WBC 16.59 (H) 05/23/2024    HGB 10.4 (L) 05/23/2024    HCT 30.8 (L) 05/23/2024    MCV 88 05/23/2024     05/23/2024    RBC 3.50 (L) 05/23/2024    MCH 29.7 05/23/2024    MCHC 33.8 05/23/2024    RDW 15.0 05/23/2024    MPV 10.7 05/23/2024     VTE Prophylaxis: Sequential compression device (Venodyne)  and Heparin    Kerri Oglesby MD  5/23/2024

## 2024-05-23 NOTE — RESPIRATORY THERAPY NOTE
RT Ventilator Management Note  Diogenes Mullins 38 y.o. male MRN: [id number]  Unit/Bed#: [facility] 14 Encounter: [id number]      Daily Screen         5/22/2024  1430 5/23/2024  0732          Patient safety screen outcome:: Failed Passed (P)       Not Ready for Weaning due to:: Underline problem not resolved --                Physical Exam:   Assessment Type: (P) Assess only  General Appearance: (P) Sedated  Respiratory Pattern: Assisted  Chest Assessment: (P) Chest expansion symmetrical  Bilateral Breath Sounds: (P) Clear, Diminished  O2 Device: vent      Resp Comments: (P) Pt placed on SPONT, PS 6 peep 6, dropped fio2 to 40%. Pt appears to be tolerating these settings well, will continue to monitor pt per resp protocol.

## 2024-05-23 NOTE — CASE MANAGEMENT
Case Management Discharge Planning Note    Patient name Diogenes Mullins  Location MICU 14/MICU 14 MRN [id number]  : 1985 Date 2024       Current Admission Date: 2024  Current Admission Diagnosis:Liver abscess   Patient Active Problem List    Diagnosis Date Noted Date Diagnosed    Liver abscess 2024       LOS (days): 1  Geometric Mean LOS (GMLOS) (days):   Days to GMLOS:     OBJECTIVE:  Risk of Unplanned Readmission Score: 17.84         Current admission status: Inpatient   Preferred Pharmacy:   PATIENT/FAMILY REPORTS NO PREFERRED PHARMACY  No address on file      Primary Care Provider: No primary care provider on file.    Primary Insurance: GRA[employer]ON HERNANDES PENDING  Secondary Insurance:     DISCHARGE DETAILS:             Other Referral/Resources/Interventions Provided:  Referral Comments: Per chart review: S/P Ex lap, drainage of hepatic abscess, four quadrant peritoneal lavage, placement of drains .  Pt extubated today.  Currently on 3L NC. CM to follow for dcp.

## 2024-05-23 NOTE — RESPIRATORY THERAPY NOTE
Resp Therapy   05/23/24 0911   Respiratory Assessment   Resp Comments Pt extubated per order, pt extubated to 6l nc. Pt able to talk and no stridor heard, will continue to monitor pt per resp protocol.   Vent Information   Ventilator End Yes   Daily Screen   Patient safety screen outcome: Passed   Spont breathing trial % for 30 min Yes   Spont breathing trial outcome: Passed   Name of Medical Team Notified: Surgery   Preparing to extubate/ Notify Nurse Yes   Extubation order obtained Yes   Consider Cuff Test Yes   Patient extubated Yes

## 2024-05-23 NOTE — PROGRESS NOTES
Vancomycin IV Pharmacy-to-Dose Consultation    Diogenes Mullins is a 38 y.o. male who is currently receiving Vancomycin IV with management by the Pharmacy Consult service.    Relevant clinical data and objective / subjective history reviewed.      Vancomycin Assessment:  Indication: Other Liver abscess  Status: critically ill  Micro:   - 5/22 Body fluid: GNR + GPC  Procalcitonin: 88.78 on 5/22  Renal Function:     Lab Results   Component Value Date    CREATININE 1.44 (H) 05/23/2024     Estimated Creatinine Clearance: 88.6 mL/min (A) (by C-G formula based on SCr of 1.44 mg/dL (H)).  Dialysis: no  Days of Therapy: 2   Current Dose: 1250 mg IV daily as needed for level less than 15   Goal AUC / Trough: -600, trough >10   Last Level: 4.7 on 5/23  Model Fit:  N/A  Assessment: ID consulted. WBC improving, febrile.       Vancomycin Plan:  New Dosing: continue current regimen   Predicted Trough / AUC: N/A  Next Level: on 5/24 at 0600  Renal Function Monitoring: Daily BMP and UOP      Pharmacy will continue to follow closely for s/sx of nephrotoxicity, infusion reactions and appropriateness of therapy.  BMP and CBC will be ordered per protocol. We will continue to follow the patient’s culture results and clinical progress daily.       Steve Azevedo, PharmD, BCCCP  Critical Care Clinical Pharmacist  Available via [employer] Text

## 2024-05-23 NOTE — PROGRESS NOTES
[facility]  Progress Note: Critical Care  Name: Diogenes Mullins 38 y.o. male I MRN: [id number]  Unit/Bed#: MICU 14 I Date of Admission: 5/22/2024   Date of Service: 5/23/2024 I Hospital Day: 1    Assessment & Plan   Neuro:   Sedation:  Propofol for RASS goal 0   SAT this AM   Analgesia:  Fentanyl 100 mcg/hr   Fentanyl 50 mcg q1h PRN breakthrough pain/agitation (2 doses/24 hrs)  IV tylenol PRN (2 doses/24 hrs)  CAM ICU daily, delirium precautions     CV:   Diagnosis: Septic Shock  MAP goal >65  Levophed currently on hold   Trend endpoints and volume resuscitate as indicated     Pulm:  Diagnosis: Acute post-operative respiratory insufficiency   AC/PC 14/6/50%/6   Adjusted based on ABG - recheck ABG at 7 am   SBT in combination with SAT this AM for hopeful extubation   VAP bundle ordered   Pulmonary toileting     GI:   Diagnosis: Perforated liver abscess with purulent peritonitis s/p diagnostic laparoscopy converted to ex-lap, drainage of hepatic abscess, 4 quadrant peritoneal lavage (5/22)   POD 1   Serial abdominal exams   Monitor SYMONE drains (4) character and output   NPO/NGT   Red surgery recommendations appreciated   Abx as below   GI ppx: PPI   Start bowel regimen when cleared for PO meds     :   Diagnosis: SHAYY  Baseline Cr: Unknown   Admission Cr: 1.58  Current Cr: 2.82  Continue IVF resuscitation   Francis in place, strict I/O   Trend renal indices     F/E/N:   F: Isolyte 150 mL/hr, bolus PRN   E: Replete electrolytes for goal K >4, Phos >3, Mg >2  N: NPO/NGT       Heme/Onc:   VTE ppx: SQH       Endo:   No active issues    ID:   Diagnosis: Septic shock 2/2 perforated liver abscess and purulent peritonitis   ID consulted, recommendations appreciated   Abx: Cefepime/Flagyl/Vancomycin day 2   F/u blood cultures, OR cultures     MSK/Skin:   Frequent turning and repositioning   PT/OT when appropriate       Disposition: Critical care    ICU Core Measures     Vented Patient  VAP  Bundle  VAP bundle ordered     A: Assess, Prevent, and Manage Pain Has pain been assessed? Yes  Need for changes to pain regimen? No   B: Both Spontaneous Awakening Trials (SATs) and Spontaneous Breathing Trials (SBTs) Plan to perform spontaneous awakening trial today? Yes   Plan to perform spontaneous breathing trial today? Yes   Obvious barriers to extubation? No   C: Choice of Sedation RASS Goal: 0 Alert and Calm  Need for changes to sedation or analgesia regimen? No   D: Delirium CAM-ICU: Negative   E: Early Mobility  Plan for early mobility? Yes   F: Family Engagement Plan for family engagement today? Yes       Antibiotic Review: Awaiting culture results.  and Infectious disease consulted    Review of Invasive Devices:    Francis Plan: Continue for accurate I/O monitoring for 48 hours  Central access plan: Medications requiring central line Hemodynamic monitoring  [place] Plan: Keep arterial line for hemodynamic monitoring and frequent labs    Prophylaxis:  VTE VTE covered by:  heparin (porcine), Subcutaneous, 5,000 Units at 05/22/24 2103       Stress Ulcer  covered bypantoprazole (PROTONIX) injection 40 mg [[id number]]        Significant 24hr Events     24hr events: POD 1 diagnostic laparoscopy converted to ex-lap, drainage of hepatic abscess, 4 quadrant peritoneal lavage. Remained intubated post-operatively and on levophed. Levo weaned to off. Received 1.5 L isolyte for hypotension overnight. T max 100.6 despite tylenol x 2 doses.      Subjective     Review of Systems   Unable to perform ROS: Intubated        Objective                            Vitals I/O      Most Recent Min/Max in 24hrs   Temp (!) 100.6 °F (38.1 °C) Temp  Min: 83.3 °F (28.5 °C)  Max: 100.6 °F (38.1 °C)   Pulse (!) 111 Pulse  Min: 110  Max: 126   Resp 18 Resp  Min: 18  Max: 44   BP 99/56 BP  Min: 89/55  Max: 122/64   O2 Sat 97 % SpO2  Min: 94 %  Max: 99 %      Intake/Output Summary (Last 24 hours) at 5/23/2024 7373  Last data filed at 5/23/2024  0314  Gross per 24 hour   Intake 5825.33 ml   Output 2590 ml   Net 3235.33 ml       Diet NPO    Invasive Monitoring   Arterial Line  [place] BP    No data recorded   MAP    No data recorded           Physical Exam   Physical Exam  Vitals reviewed.   Eyes:      Pupils: Pupils are equal, round, and reactive to light.   Skin:     General: Skin is warm and dry.   HENT:      Head: Normocephalic.   Neck:      Vascular: Central line present.   Cardiovascular:      Rate and Rhythm: Regular rhythm. Tachycardia present.      Heart sounds: Normal heart sounds.   Musculoskeletal:      Right lower leg: No edema.      Left lower leg: No edema.   Abdominal: General: There is distension (mild).     Palpations: Abdomen is soft.      Tenderness: There is abdominal tenderness (incisional).      Comments: Midline incision with dressing in place with drainage to inferior portion  4 SYMONE drains, 1 in each quadrant, all with serous output    Constitutional:       General: He is not in acute distress.     Appearance: He is ill-appearing.      Interventions: He is sedated, intubated and restrained.   Pulmonary:      Effort: Pulmonary effort is normal. He is intubated.      Breath sounds: Normal breath sounds.   Neurological:      GCS: GCS eye subscore is 3. GCS verbal subscore is 1. GCS motor subscore is 6.      Comments: GCS 10T  Follows commands in all extremities and nods head appropriately to questions on propofol and fentanyl    Genitourinary/Anorectal:  Francis present.          Diagnostic Studies      Imaging: US right upper quadrant    Result Date: 5/22/2024  Impression: 8.8 x 8.3 x 7.1 cm gas-containing right hepatic lobe lesion in keeping with previously identified lung abscess. Small volume of ascites. Pronounced hepatomegaly. 12 mm right hepatic lobe focus potentially a small hemangioma. Workstation performed: [id number]     XR chest 1 view portable    Result Date: 5/22/2024  Impression: No acute consolidation or congestion  Workstation performed: [id number]     CT chest abdomen pelvis wo contrast    Result Date: 5/22/2024  Impression: Findings are most concerning for perforated liver abscess with resultant peritonitis. I personally discussed this study with MOISES PERALES on 5/22/2024 4:05 AM. Workstation performed: [id number]     I have personally reviewed pertinent reports.       Medications:  Scheduled PRN   cefepime, 2,000 mg, Q12H  chlorhexidine, 15 mL, Q12H PRO  heparin (porcine), 5,000 Units, Q8H PRO  metroNIDAZOLE, 500 mg, Q8H  pantoprazole, 40 mg, Q24H PRO      acetaminophen, 1,000 mg, Q6H PRN  fentaNYL, 50 mcg, Q1H PRN  naloxone, 0.04 mg, Q1MIN PRN  ondansetron, 4 mg, Q4H PRN  vancomycin, 15 mg/kg (Adjusted), Daily PRN       Continuous    fentaNYL, 100 mcg/hr, Last Rate: 100 mcg/hr (05/22/24 2215)  multi-electrolyte, 150 mL/hr, Last Rate: 150 mL/hr (05/22/24 1501)  norepinephrine, 1-30 mcg/min, Last Rate: Stopped (05/22/24 1450)  propofol, 5-50 mcg/kg/min, Last Rate: 50 mcg/kg/min (05/23/24 0314)         Labs:    CBC    Recent Labs     05/22/24  1440 05/23/24  0435   WBC 21.29* 16.59*   HGB 11.7* 10.4*   HCT 34.1* 30.8*    228     BMP    Recent Labs     05/22/24  0928 05/22/24  1208 05/22/24  1339 05/22/24  1440   SODIUM 132*  --   --  135   K 3.3*  --   --  3.6   CL 96  --   --  103   CO2 19*   < > 19* 20*   AGAP 17*  --   --  12   BUN 70*  --   --  61*   CREATININE 2.00*  --   --  1.58*   CALCIUM 6.8*  --   --  7.5*    < > = values in this interval not displayed.       Coags    Recent Labs     05/22/24  0150   INR 1.32*   PTT 25        Additional Electrolytes  Recent Labs     05/22/24  0928 05/22/24  1208 05/22/24  1339 05/22/24  1440   MG 2.4  --   --  2.3   PHOS 5.7*  --   --  6.6*   CAIONIZED  --    < > 1.15 1.08*    < > = values in this interval not displayed.          Blood Gas    Recent Labs     05/22/24  2310   PHART 7.439   FMP4XKG 34.3*   PO2ART 127.6   IVH1WJD 22.7   BEART -1.0   SOURCE Line, Arterial      Recent Labs     05/22/24  0859 05/22/24  1441 05/22/24  2310   PHVEN 7.396  --   --    BGK7QUC 30.4*  --   --    PO2VEN 66.2*  --   --    ZEZ1BJF 18.2*  --   --    BEVEN -5.4  --   --    P9HCSDU 90.2*  --   --    SOURCE  --    < > Line, Arterial    < > = values in this interval not displayed.    LFTs  Recent Labs     05/22/24  0928 05/22/24  1440   ALT 81* 78*  78*   AST 62* 85*  85*   ALKPHOS 94 77  77   ALB 2.3* 2.3*  2.3*   TBILI 3.17* 2.52*  2.52*       Infectious  Recent Labs     05/22/24  0150   PROCALCITONI 85.78*     Glucose  Recent Labs     05/22/24  0146 05/22/24  0928 05/22/24  1440   GLUC 115 127 117               Rebecca Tom PA-C

## 2024-05-23 NOTE — PROGRESS NOTES
Progress Note - Infectious Disease   Diogenes Mullins 38 y.o. male MRN: [id number]  Unit/Bed#: MICU 14 Encounter: [id number]      IMPRESSION & RECOMMENDATIONS:   Impression/Recommendations:  1.  Severe sepsis.  Tachycardia, tachypnea, leukocytosis, elevated lactic acid.  Secondary to #2.  Consider possibility of bacteremia.  Blood cultures are negative thus far.     -Antibiotic plan as below  -Follow temperatures and hemodynamics  -Continue to follow-up blood cultures  -CBC in AM to assess treatment response  -Supportive care per critical care service     2.  Perforated liver abscess with purulent peritonitis.  Status post exploratory laparotomy with drainage of abscess and four-quadrant peritoneal lavage.  Intraoperative findings included extensive four-quadrant purulent peritonitis with near frozen abdomen due to inflammation.  Unclear etiology.  No reported recent travel.  Abdominal CT with no other acute abnormalities including evidence of malignancy. RUQ US with no biliary abnormalities.  No known immunosuppressed state. Operative cultures with isolation of group F strep thus far.  Gram stain also showed GNR's.     -Continue empiric vancomycin/cefepime/Flagyl for now  -Vancomycin dosing per pharmacy  -Follow-up operative cultures and tailor antibiotics accordingly  -Follow-up pathology  -Surgery follow-up ongoing  -Serial abdominal exams     3.  Acute respiratory insufficiency.  Now extubated.     -Follow O2 requirements.  -Management as above     4.  Acute kidney injury.  In the setting of severe sepsis.  No acute  findings on CT.  Creatinine continues to improve.     -Recheck BMP in AM  -Dose adjust antibiotics based on renal function as indicated  -Volume management per critical care service     5.  Elevated LFTs.  In the setting of #1/#2.  No radiologic evidence of biliary obstruction.  LFTs are improving.     -Management as above  -Trend LFTs        Antibiotics:  Vancomycin/cefepime/Flagyl 2  POD1            Subjective:  Developed fevers overnight.  Extubated this morning.  Off pressor support.    Objective:  Vitals:  Temp:  [83.3 °F (28.5 °C)-101.7 °F (38.7 °C)] 98.1 °F (36.7 °C)  HR:  [110-126] 116  Resp:  [18-35] 34  BP: ()/(50-66) 133/66  SpO2:  [94 %-99 %] 94 %  Temp (24hrs), Av.5 °F (37.5 °C), Min:83.3 °F (28.5 °C), Max:101.7 °F (38.7 °C)  Current: Temperature: 98.1 °F (36.7 °C)    Physical Exam:   General: No acute distress, nontoxic  Eyes:  Conjunctive clear with no hemorrhages or effusions  Oropharynx:  No visible lesions  Neck: Trachea midline  Lungs: Nonlabored respirations  Abdomen: Abdominal dressing intact, drains in place  Extremities:  No peripheral cyanosis, clubbing, or edema  Skin:  No visible rashes or draining wounds  Neurological:  Moves all four extremities spontaneously    Lab Results:  I have personally reviewed pertinent labs.  Results from last 7 days   Lab Units 24  0435 24  1440 24  1339 24  1254 24  1208 24  1208 24  0928   POTASSIUM mmol/L 3.3* 3.6  --   --   --   --  3.3*   CHLORIDE mmol/L 104 103  --   --   --   --  96   CO2 mmol/L 22 20*  --   --   --   --  19*   CO2, I-STAT mmol/L  --   --  19* 20*   < > 23  --    BUN mg/dL 53* 61*  --   --   --   --  70*   CREATININE mg/dL 1.44* 1.58*  --   --   --   --  2.00*   EGFR ml/min/1.73sq m 60 54  --   --   --   --  40   GLUCOSE, ISTAT mg/dl  --   --  125 134  --  130  --    CALCIUM mg/dL 7.0* 7.5*  --   --   --   --  6.8*   AST U/L 79* 85*  85*  --   --   --   --  62*   ALT U/L 75* 78*  78*  --   --   --   --  81*   ALK PHOS U/L 73 77  77  --   --   --   --  94    < > = values in this interval not displayed.     Results from last 7 days   Lab Units 24  0435 24  1440 24  1339 24  1208 24  0749   WBC Thousand/uL 16.59* 21.29*  --   --  20.17*   HEMOGLOBIN g/dL 10.4* 11.7*  --   --  12.9   I STAT HEMOGLOBIN g/dl  --   --  10.9*   < >  --    PLATELETS  Thousands/uL 228 254  --   --  265    < > = values in this interval not displayed.     Results from last 7 days   Lab Units 05/22/24  1203 05/22/24  0835 05/22/24  0815 05/22/24  0415 05/22/24  0210 05/22/24  0150   BLOOD CULTURE   --  No Growth at 24 hrs. No Growth at 24 hrs.  --  No Growth at 24 hrs. No Growth at 24 hrs.   GRAM STAIN RESULT  4+ Polys*  4+ Gram negative rods*  1+ Gram positive cocci in pairs*  --   --   --   --   --    URINE CULTURE   --   --   --  No Growth <1000 cfu/mL  --   --    BODY FLUID CULTURE, STERILE  4+ Growth of Beta Hemolytic Streptococcus Group F*  --   --   --   --   --        Imaging Studies:   I have personally reviewed pertinent imaging study reports and images in PACS.    EKG, Pathology, and Other Studies:   I have personally reviewed pertinent reports.

## 2024-05-23 NOTE — RESPIRATORY THERAPY NOTE
RT Ventilator Management Note  Diogenes Mullins 38 y.o. male MRN: [id number]  Unit/Bed#: [facility] 14 Encounter: [id number]      Daily Screen         5/22/2024  1430             Patient safety screen outcome:: Failed    Not Ready for Weaning due to:: Underline problem not resolved              Physical Exam:   Assessment Type: (P) Assess only  General Appearance: (P) Sedated  Respiratory Pattern: (P) Assisted  Chest Assessment: (P) Chest expansion symmetrical  Bilateral Breath Sounds: (P) Clear, Diminished  O2 Device: (P) vent      Resp Comments: (P) No vent changes at this time pt is stable on current PC settings overnight. Will cont to monitor pt per protocol.

## 2024-05-24 LAB
ALBUMIN SERPL BCP-MCNC: 2.2 G/DL (ref 3.5–5)
ALP SERPL-CCNC: 106 U/L (ref 34–104)
ALT SERPL W P-5'-P-CCNC: 82 U/L (ref 7–52)
ANION GAP SERPL CALCULATED.3IONS-SCNC: 10 MMOL/L (ref 4–13)
AST SERPL W P-5'-P-CCNC: 90 U/L (ref 13–39)
ATRIAL RATE: 125 BPM
BACTERIA SPEC ANAEROBE CULT: ABNORMAL
BILIRUB DIRECT SERPL-MCNC: 1.14 MG/DL (ref 0–0.2)
BILIRUB SERPL-MCNC: 1.89 MG/DL (ref 0.2–1)
BUN SERPL-MCNC: 30 MG/DL (ref 5–25)
CA-I BLD-SCNC: 1.04 MMOL/L (ref 1.12–1.32)
CALCIUM SERPL-MCNC: 6.8 MG/DL (ref 8.4–10.2)
CHLORIDE SERPL-SCNC: 100 MMOL/L (ref 96–108)
CO2 SERPL-SCNC: 25 MMOL/L (ref 21–32)
CREAT SERPL-MCNC: 0.78 MG/DL (ref 0.6–1.3)
ERYTHROCYTE [DISTWIDTH] IN BLOOD BY AUTOMATED COUNT: 15.3 % (ref 11.6–15.1)
GFR SERPL CREATININE-BSD FRML MDRD: 113 ML/MIN/1.73SQ M
GLUCOSE SERPL-MCNC: 122 MG/DL (ref 65–140)
HCT VFR BLD AUTO: 31.4 % (ref 36.5–49.3)
HGB BLD-MCNC: 10.5 G/DL (ref 12–17)
MAGNESIUM SERPL-MCNC: 3 MG/DL (ref 1.9–2.7)
MCH RBC QN AUTO: 30 PG (ref 26.8–34.3)
MCHC RBC AUTO-ENTMCNC: 33.4 G/DL (ref 31.4–37.4)
MCV RBC AUTO: 90 FL (ref 82–98)
P AXIS: 59 DEGREES
PHOSPHATE SERPL-MCNC: 2.2 MG/DL (ref 2.7–4.5)
PLATELET # BLD AUTO: 240 THOUSANDS/UL (ref 149–390)
PMV BLD AUTO: 10.7 FL (ref 8.9–12.7)
POTASSIUM SERPL-SCNC: 3.7 MMOL/L (ref 3.5–5.3)
PR INTERVAL: 126 MS
PROT SERPL-MCNC: 5 G/DL (ref 6.4–8.4)
QRS AXIS: 3 DEGREES
QRSD INTERVAL: 92 MS
QT INTERVAL: 348 MS
QTC INTERVAL: 502 MS
RBC # BLD AUTO: 3.5 MILLION/UL (ref 3.88–5.62)
SODIUM SERPL-SCNC: 135 MMOL/L (ref 135–147)
T WAVE AXIS: 41 DEGREES
VANCOMYCIN SERPL-MCNC: 3.1 UG/ML (ref 10–20)
VENTRICULAR RATE: 125 BPM
WBC # BLD AUTO: 20.81 THOUSAND/UL (ref 4.31–10.16)

## 2024-05-24 PROCEDURE — 99024 POSTOP FOLLOW-UP VISIT: CPT | Performed by: SURGERY

## 2024-05-24 PROCEDURE — 83735 ASSAY OF MAGNESIUM: CPT | Performed by: NURSE PRACTITIONER

## 2024-05-24 PROCEDURE — 88112 CYTOPATH CELL ENHANCE TECH: CPT | Performed by: PATHOLOGY

## 2024-05-24 PROCEDURE — 82330 ASSAY OF CALCIUM: CPT | Performed by: NURSE PRACTITIONER

## 2024-05-24 PROCEDURE — 88305 TISSUE EXAM BY PATHOLOGIST: CPT | Performed by: PATHOLOGY

## 2024-05-24 PROCEDURE — NC001 PR NO CHARGE: Performed by: NURSE PRACTITIONER

## 2024-05-24 PROCEDURE — 80048 BASIC METABOLIC PNL TOTAL CA: CPT | Performed by: NURSE PRACTITIONER

## 2024-05-24 PROCEDURE — 93010 ELECTROCARDIOGRAM REPORT: CPT | Performed by: INTERNAL MEDICINE

## 2024-05-24 PROCEDURE — 85027 COMPLETE CBC AUTOMATED: CPT | Performed by: NURSE PRACTITIONER

## 2024-05-24 PROCEDURE — 99233 SBSQ HOSP IP/OBS HIGH 50: CPT | Performed by: INTERNAL MEDICINE

## 2024-05-24 PROCEDURE — 99233 SBSQ HOSP IP/OBS HIGH 50: CPT | Performed by: STUDENT IN AN ORGANIZED HEALTH CARE EDUCATION/TRAINING PROGRAM

## 2024-05-24 PROCEDURE — 80076 HEPATIC FUNCTION PANEL: CPT | Performed by: NURSE PRACTITIONER

## 2024-05-24 PROCEDURE — 84100 ASSAY OF PHOSPHORUS: CPT | Performed by: NURSE PRACTITIONER

## 2024-05-24 PROCEDURE — 80202 ASSAY OF VANCOMYCIN: CPT | Performed by: SURGERY

## 2024-05-24 PROCEDURE — C9113 INJ PANTOPRAZOLE SODIUM, VIA: HCPCS | Performed by: STUDENT IN AN ORGANIZED HEALTH CARE EDUCATION/TRAINING PROGRAM

## 2024-05-24 RX ORDER — CALCIUM GLUCONATE 20 MG/ML
2 INJECTION, SOLUTION INTRAVENOUS ONCE
Status: COMPLETED | OUTPATIENT
Start: 2024-05-24 | End: 2024-05-24

## 2024-05-24 RX ORDER — POTASSIUM CHLORIDE 20 MEQ/1
40 TABLET, EXTENDED RELEASE ORAL ONCE
Status: COMPLETED | OUTPATIENT
Start: 2024-05-24 | End: 2024-05-24

## 2024-05-24 RX ADMIN — ACETAMINOPHEN 975 MG: 325 TABLET, FILM COATED ORAL at 21:59

## 2024-05-24 RX ADMIN — METHOCARBAMOL 500 MG: 500 TABLET ORAL at 05:02

## 2024-05-24 RX ADMIN — GABAPENTIN 100 MG: 100 CAPSULE ORAL at 15:22

## 2024-05-24 RX ADMIN — VANCOMYCIN HYDROCHLORIDE 1250 MG: 10 INJECTION, POWDER, LYOPHILIZED, FOR SOLUTION INTRAVENOUS at 18:27

## 2024-05-24 RX ADMIN — ONDANSETRON 4 MG: 2 INJECTION INTRAMUSCULAR; INTRAVENOUS at 16:19

## 2024-05-24 RX ADMIN — HYDROMORPHONE HYDROCHLORIDE 1 MG: 1 INJECTION, SOLUTION INTRAMUSCULAR; INTRAVENOUS; SUBCUTANEOUS at 03:15

## 2024-05-24 RX ADMIN — GABAPENTIN 100 MG: 100 CAPSULE ORAL at 08:02

## 2024-05-24 RX ADMIN — METHOCARBAMOL 500 MG: 500 TABLET ORAL at 18:27

## 2024-05-24 RX ADMIN — ONDANSETRON 4 MG: 2 INJECTION INTRAMUSCULAR; INTRAVENOUS at 20:08

## 2024-05-24 RX ADMIN — CEFEPIME 2000 MG: 2 INJECTION, POWDER, FOR SOLUTION INTRAVENOUS at 16:24

## 2024-05-24 RX ADMIN — HEPARIN SODIUM 5000 UNITS: 5000 INJECTION INTRAVENOUS; SUBCUTANEOUS at 21:59

## 2024-05-24 RX ADMIN — ACETAMINOPHEN 975 MG: 325 TABLET, FILM COATED ORAL at 05:02

## 2024-05-24 RX ADMIN — CEFEPIME 2000 MG: 2 INJECTION, POWDER, FOR SOLUTION INTRAVENOUS at 07:40

## 2024-05-24 RX ADMIN — GABAPENTIN 100 MG: 100 CAPSULE ORAL at 21:59

## 2024-05-24 RX ADMIN — METHOCARBAMOL 500 MG: 500 TABLET ORAL at 12:04

## 2024-05-24 RX ADMIN — VANCOMYCIN HYDROCHLORIDE 1250 MG: 10 INJECTION, POWDER, LYOPHILIZED, FOR SOLUTION INTRAVENOUS at 10:14

## 2024-05-24 RX ADMIN — METHOCARBAMOL 500 MG: 500 TABLET ORAL at 23:47

## 2024-05-24 RX ADMIN — POTASSIUM CHLORIDE 40 MEQ: 1500 TABLET, EXTENDED RELEASE ORAL at 08:03

## 2024-05-24 RX ADMIN — PANTOPRAZOLE SODIUM 40 MG: 40 INJECTION, POWDER, FOR SOLUTION INTRAVENOUS at 08:03

## 2024-05-24 RX ADMIN — HEPARIN SODIUM 5000 UNITS: 5000 INJECTION INTRAVENOUS; SUBCUTANEOUS at 05:02

## 2024-05-24 RX ADMIN — OXYCODONE HYDROCHLORIDE 10 MG: 10 TABLET ORAL at 04:51

## 2024-05-24 RX ADMIN — ONDANSETRON 4 MG: 2 INJECTION INTRAMUSCULAR; INTRAVENOUS at 04:51

## 2024-05-24 RX ADMIN — METRONIDAZOLE 500 MG: 500 INJECTION, SOLUTION INTRAVENOUS at 20:15

## 2024-05-24 RX ADMIN — METRONIDAZOLE 500 MG: 500 INJECTION, SOLUTION INTRAVENOUS at 12:06

## 2024-05-24 RX ADMIN — OXYCODONE HYDROCHLORIDE 10 MG: 10 TABLET ORAL at 19:44

## 2024-05-24 RX ADMIN — OXYCODONE HYDROCHLORIDE 10 MG: 10 TABLET ORAL at 00:11

## 2024-05-24 RX ADMIN — SODIUM CHLORIDE, SODIUM GLUCONATE, SODIUM ACETATE, POTASSIUM CHLORIDE, MAGNESIUM CHLORIDE, SODIUM PHOSPHATE, DIBASIC, AND POTASSIUM PHOSPHATE 100 ML/HR: .53; .5; .37; .037; .03; .012; .00082 INJECTION, SOLUTION INTRAVENOUS at 15:34

## 2024-05-24 RX ADMIN — METRONIDAZOLE 500 MG: 500 INJECTION, SOLUTION INTRAVENOUS at 03:15

## 2024-05-24 RX ADMIN — HEPARIN SODIUM 5000 UNITS: 5000 INJECTION INTRAVENOUS; SUBCUTANEOUS at 13:28

## 2024-05-24 RX ADMIN — CALCIUM GLUCONATE 2 G: 20 INJECTION, SOLUTION INTRAVENOUS at 09:27

## 2024-05-24 RX ADMIN — HYDROMORPHONE HYDROCHLORIDE 1 MG: 1 INJECTION, SOLUTION INTRAMUSCULAR; INTRAVENOUS; SUBCUTANEOUS at 10:08

## 2024-05-24 RX ADMIN — OXYCODONE HYDROCHLORIDE 5 MG: 5 TABLET ORAL at 08:02

## 2024-05-24 RX ADMIN — ACETAMINOPHEN 975 MG: 325 TABLET, FILM COATED ORAL at 13:28

## 2024-05-24 RX ADMIN — OXYCODONE HYDROCHLORIDE 10 MG: 10 TABLET ORAL at 15:22

## 2024-05-24 RX ADMIN — CEFEPIME 2000 MG: 2 INJECTION, POWDER, FOR SOLUTION INTRAVENOUS at 23:47

## 2024-05-24 RX ADMIN — ONDANSETRON 4 MG: 2 INJECTION INTRAMUSCULAR; INTRAVENOUS at 12:12

## 2024-05-24 RX ADMIN — OXYCODONE HYDROCHLORIDE 10 MG: 10 TABLET ORAL at 23:41

## 2024-05-24 NOTE — PROGRESS NOTES
Vancomycin IV Pharmacy-to-Dose Consultation    Diogenes Mullins is a 38 y.o. male who is currently receiving Vancomycin IV with management by the Pharmacy Consult service.    Relevant clinical data and objective / subjective history reviewed.      Vancomycin Assessment:  Indication: Other Liver abscess  Status: critically ill  Micro:   -  Anaerobic culture: Prevotella oris  -  Body fluid: Beta Hemolytic Streptococcus + GNR  Procalcitonin: 88.78 on   Renal Function:     Lab Results   Component Value Date    CREATININE 0.78 2024     Estimated Creatinine Clearance: 163.7 mL/min (by C-G formula based on SCr of 0.78 mg/dL).  Dialysis: no  Days of Therapy: 3  Current Dose: 1250 mg IV daily as needed for level less than 15   Goal AUC / Trough: -600, trough >10   Last Level: 4.7 on   Model Fit:  Good  Assessment: ID consulted. WBC improving, febrile.       Vancomycin Plan:  New Dosin mg IV q8h   Predicted Trough / AUC: 14.8 / 490  Next Level: on  at 0600  Renal Function Monitoring: Daily BMP and UOP      Pharmacy will continue to follow closely for s/sx of nephrotoxicity, infusion reactions and appropriateness of therapy.  BMP and CBC will be ordered per protocol. We will continue to follow the patient’s culture results and clinical progress daily.       Steve Azevedo, PharmD, BCCCP  Critical Care Clinical Pharmacist  Available via [employer]

## 2024-05-24 NOTE — CASE MANAGEMENT
Case Management Discharge Planning Note    Patient name Diogenes Mullins  Location MICU 14/MICU 14 MRN [id number]  : 1985 Date 2024       Current Admission Date: 2024  Current Admission Diagnosis:Liver abscess   Patient Active Problem List    Diagnosis Date Noted Date Diagnosed    Liver abscess 2024       LOS (days): 2  Geometric Mean LOS (GMLOS) (days):   Days to GMLOS:     OBJECTIVE:  Risk of Unplanned Readmission Score: 16.8         Current admission status: Inpatient   Preferred Pharmacy:   PATIENT/FAMILY REPORTS NO PREFERRED PHARMACY  No address on file      Primary Care Provider: No primary care provider on file.    Primary Insurance: [employer] PENDING  Secondary Insurance:     DISCHARGE DETAILS:          Other Referral/Resources/Interventions Provided:  Referral Comments: Per chart review: Pain control.  Cultures pending. Extubated  - on 2L NC. SYMONE drains. Tachyardia/Tachypnea noted.

## 2024-05-24 NOTE — PROGRESS NOTES
[facility]  Progress Note: Critical Care  Name: Diogenes Mullins 38 y.o. male I MRN: [id number]  Unit/Bed#: MICU 14 I Date of Admission: 5/22/2024   Date of Service: 5/24/2024 I Hospital Day: 2    Assessment & Plan   Neuro:   Diagnosis: Acute postoperative pain  Plan:   Analgesia-ERS protocol gabapentin 100 mg TID, Robaxin 500 mg eq6, Tylenol 975 Q8, Dilaudid 1 mg Q3, oxy 5/10 Q4  CAM ICU daily, delirium precautions    CV:   Diagnosis: Septic shock  Plan:   Now off pressors  Continue cardiopulmonary monitoring  MAP > than 65    Pulm:  Diagnosis: Acute postoperative respiratory insufficiency, extubated  Plan:   Encourage I-S use and pulmonary toilet  Respiratory protocol  Wean O2 as tolerated  Maintain SP O2> 92%    GI:   Diagnosis: Perforated hepatic abscess with purulent four-quadrant peritonitis s/p diagnostic lap converted to ex lap, drainage of hepatic abscess and peritoneal lavage on 5/22  Plan:   Monitor abdominal exams  Maintain 4 quadrant SYMONE drains monitor character and output--currently serous fluid  Right surgery following  GI prophylaxis  Initiate bowel regimen when having return of bowel function    :   Diagnosis: SHAYY  Plan:   Trend creatinine  Monitor I's and O's    F/E/N:   Diagnosis:   Plan:   F: Isolyte at 100 cc an hour  E: Replete electrolytes per protocol  N: NPO sips of clears diet per general surgery    Heme/Onc:   Diagnosis:.  Plan: dvt ppx SQH    Endo:   Diagnosis: No active issues    ID:   Diagnosis: Septic shock secondary to perforated hepatic abscess and purulent peritonitis  Plan:   Appreciate ID recommendations  Continue empiric vancomycin/cefepime/Flagyl day 3  Body fluid culture growing beta-hemolytic strep group F  Blood culture negative at 24 hours    MSK/Skin:   Diagnosis:   Plan:   Out of bed to chair  Encourage ambulation  PT OT    Disposition: Stepdown Level 1    ICU Core Measures     A: Assess, Prevent, and Manage Pain Has pain been  assessed? Yes  Need for changes to pain regimen? No   B: Both SAT/SAT  N/A   C: Choice of Sedation RASS Goal: 0 Alert and Calm  Need for changes to sedation or analgesia regimen? No   D: Delirium CAM-ICU: Negative   E: Early Mobility  Plan for early mobility? Yes   F: Family Engagement Plan for family engagement today? Yes       Antibiotic Review: Awaiting culture results.     Review of Invasive Devices:        Prophylaxis:  VTE VTE covered by:  heparin (porcine), Subcutaneous, 5,000 Units at 05/24/24 0502       Stress Ulcer  covered bypantoprazole (PROTONIX) injection 40 mg [[id number]]        Significant 24hr Events     24hr events: No acute events overnight.  Patient muscle spasm related pain, given gabapentin and Robaxin, this morning he feels his pain is adequately controlled although still has some discomfort whenever he takes a deep breath.  The patient was educated that this is to be expected given the nature and severity of his hepatic abscess rupture.  He has been tolerating sips of clear liquids.  He denies any nausea vomiting but does note he is occasionally belching.  Reports no return of bowel function yet     Subjective     Review of Systems  Negative except for mentioned above     Objective                            Vitals I/O      Most Recent Min/Max in 24hrs   Temp 98.5 °F (36.9 °C) Temp  Min: 98 °F (36.7 °C)  Max: 101.7 °F (38.7 °C)   Pulse (!) 116 Pulse  Min: 108  Max: 125   Resp (!) 30 Resp  Min: 19  Max: 42   /75 BP  Min: 107/60  Max: 140/74   O2 Sat 94 % SpO2  Min: 90 %  Max: 96 %      Intake/Output Summary (Last 24 hours) at 5/24/2024 0719  Last data filed at 5/24/2024 0501  Gross per 24 hour   Intake 4535.25 ml   Output 3000 ml   Net 1535.25 ml       Diet NPO; Sips of clear liquids    Invasive Monitoring           Physical Exam   Physical Exam  Eyes:      Extraocular Movements: Extraocular movements intact.      Pupils: Pupils are equal, round, and reactive to light.   Skin:      General: Skin is warm.   HENT:      Head: Normocephalic.      Mouth/Throat:      Mouth: Mucous membranes are moist.   Cardiovascular:      Rate and Rhythm: Tachycardia present.   Abdominal:      Comments: Soft, tender post surgically, nondistended, midline incision with Mepilex saturated with underlying earlene in place.   Constitutional:       General: He is not in acute distress.     Appearance: He is well-developed. He is not ill-appearing.   Pulmonary:      Effort: Tachypnea present.   Neurological:      General: No focal deficit present.      Mental Status: He is alert and oriented to person, place and time. He is calm.            Diagnostic Studies      EKG: No new studies  Imaging: No new imaging       Medications:  Scheduled PRN   acetaminophen, 975 mg, Q8H PRO  cefepime, 2,000 mg, Q8H  gabapentin, 100 mg, TID  heparin (porcine), 5,000 Units, Q8H PRO  methocarbamol, 500 mg, Q6H PRO  metroNIDAZOLE, 500 mg, Q8H  pantoprazole, 40 mg, Q24H PRO      HYDROmorphone, 1 mg, Q3H PRN  naloxone, 0.04 mg, Q1MIN PRN  ondansetron, 4 mg, Q4H PRN  oxyCODONE, 10 mg, Q4H PRN   Or  oxyCODONE, 5 mg, Q4H PRN  vancomycin, 15 mg/kg (Adjusted), Daily PRN       Continuous    multi-electrolyte, 100 mL/hr, Last Rate: 100 mL/hr (05/24/24 0401)         Labs:    CBC    Recent Labs     05/23/24  0435 05/24/24  0458   WBC 16.59* 20.81*   HGB 10.4* 10.5*   HCT 30.8* 31.4*    240     BMP    Recent Labs     05/23/24  0435 05/24/24  0458   SODIUM 136 135   K 3.3* 3.7    100   CO2 22 25   AGAP 10 10   BUN 53* 30*   CREATININE 1.44* 0.78   CALCIUM 7.0* 6.8*       Coags    Recent Labs     05/23/24  0435   INR 1.40*        Additional Electrolytes  Recent Labs     05/22/24  1440 05/23/24  0435 05/24/24  0458   MG 2.3 3.0* 3.0*   PHOS 6.6* 3.8 2.2*   CAIONIZED 1.08*  --  1.04*          Blood Gas    Recent Labs     05/23/24  0722   PHART 7.467*   NNS7EVZ 34.1*   PO2ART 124.4   VSX8JQU 24.1   BEART 0.8   SOURCE Line, Arterial     Recent Labs      05/22/24  0859 05/22/24  1441 05/23/24  0722   PHVEN 7.396  --   --    PDT5NZT 30.4*  --   --    PO2VEN 66.2*  --   --    WCH8QRS 18.2*  --   --    BEVEN -5.4  --   --    [id number] 90.2*  --   --    SOURCE  --    < > Line, Arterial    < > = values in this interval not displayed.    LFTs  Recent Labs     05/23/24  0435 05/24/24  0458   ALT 75* 82*   AST 79* 90*   ALKPHOS 73 106*   ALB 2.1* 2.2*   TBILI 2.11* 1.89*       Infectious  No recent results  Glucose  Recent Labs     05/22/24  0928 05/22/24  1440 05/23/24  0435 05/24/24  0458   GLUC 127 117 99 122               Pritesh Dowd MD

## 2024-05-24 NOTE — PROGRESS NOTES
"General Surgery  Progress Note   Diogenes Mullins 38 y.o. male MRN: [id number]  Unit/Bed#: MICU 14 Encounter: [id number]    Assessment:  38-year-old male with perforated hepatic abscess status post  diagnostic laparoscopy converted to exploratory laparotomy with drainage of hepatic abscess, four-quadrant peritoneal lavage 2/2 purulent peritonitis and drain placements.    Afebrile. Continued tachycardia and tachypnea  Extubated yesterday, now on 2L NC    UOP: 1025 mL  LUQ SYMONE (splenic recess) 110 cc  LLQ SYMONE (pelvis)  105 cc  RUQ SYMONE (suprahepatic) 65 cc  RLQ SYMONE (subhepatic) 95 cc    WBC 20.81 (16.59)  Hgb 10.5 (10.4)  Cr 0.78 (1.44)     blood cx: NG at 48h  OR cultures: Gram-negative rods, B hemolytic strep group F    Plan:  Follow-up OR cultures and cytology  Continue cefepime, Flagyl, Vanc  ID following, appreciate recommendations  Change midline dressing today - repack Telfa earlene  Maintain SYMONE drains x4 to bulb suction  Anticipate requiring repeat scanning in next several days   Monitor leukocytosis   Serial abdominal exams  DVT ppx: Heparin  Pain/ nausea control PRN  OOB/ ambulation       Subjective/Objective     Subjective:   Patient seen and examined at bedside, in no acute distress. No acute events overnight. Reports improved abdominal pain in comparison to preoperatively. Some nausea and belching, no vomiting, flatus or BM.     Objective:   Vitals:Blood pressure 132/68, pulse (!) 114, temperature 98.5 °F (36.9 °C), temperature source Oral, resp. rate (!) 36, height 5' 10\" (1.778 m), weight 118 kg (260 lb 2.3 oz), SpO2 91%.  Temp (24hrs), Av.1 °F (37.3 °C), Min:98 °F (36.7 °C), Max:101.7 °F (38.7 °C)        Intake/Output Summary (Last 24 hours) at 2024 0821  Last data filed at 2024 0501  Gross per 24 hour   Intake 2262.08 ml   Output 2615 ml   Net -352.92 ml       Invasive Devices       Peripheral Intravenous Line  Duration             Peripheral IV 24 Left Antecubital <1 day    " Peripheral IV 05/23/24 Left Wrist <1 day              Drain  Duration             Closed/Suction Drain LLQ Bulb 19 Fr. 1 day    Closed/Suction Drain LUQ Bulb 19 Fr. 1 day    Closed/Suction Drain Right RLQ Bulb 19 Fr. 1 day    Closed/Suction Drain Right RUQ Bulb 19 Fr. 1 day                    Physical Exam:  General: No acute distress, alert and oriented   CV: Well perfused, tachycardic   Lungs: no respiratory distress, tachypnea  Abdomen: Soft, distended. Incision with mepilex overlying, saturation from bedatine earlene. SYMONE x4 serous appearing  Extremities: No edema, clubbing or cyanosis  Skin: Warm, dry    Lab Results: BMP/CMP:   Lab Results   Component Value Date    SODIUM 135 05/24/2024    K 3.7 05/24/2024     05/24/2024    CO2 25 05/24/2024    BUN 30 (H) 05/24/2024    CREATININE 0.78 05/24/2024    CALCIUM 6.8 (L) 05/24/2024    AST 90 (H) 05/24/2024    ALT 82 (H) 05/24/2024    ALKPHOS 106 (H) 05/24/2024    EGFR 113 05/24/2024    and CBC:   Lab Results   Component Value Date    WBC 20.81 (H) 05/24/2024    HGB 10.5 (L) 05/24/2024    HCT 31.4 (L) 05/24/2024    MCV 90 05/24/2024     05/24/2024    RBC 3.50 (L) 05/24/2024    MCH 30.0 05/24/2024    MCHC 33.4 05/24/2024    RDW 15.3 (H) 05/24/2024    MPV 10.7 05/24/2024     VTE Prophylaxis: Sequential compression device (Venodyne)  and Heparin    Kerri Oglesby MD  5/24/2024

## 2024-05-24 NOTE — PROGRESS NOTES
Critical Care Interval Transfer Note:    Please refer to progress note from earlier today for full details.     Barriers to discharge:   IV antibiotics   Abdominal drains      Consults: IP CONSULT TO CASE MANAGEMENT  IP CONSULT TO INFECTIOUS DISEASES  IP CONSULT TO ACUTE CARE SURGERY  IP CONSULT TO PHARMACY    Recommended to review admission imaging for incidental findings and document in discharge navigator: Chart reviewed, no known incidental findings noted at this time.      Discharge Plan: Anticipate discharge in 48-72 hrs to home.      Patient seen and evaluated by Critical Care today and deemed to be appropriate for transfer to Med Surg. Spoke to Darcie DURBIN from [facility] to accept transfer. Critical care can be contacted via [facility] with any questions or concerns.

## 2024-05-24 NOTE — PROGRESS NOTES
Progress Note - Infectious Disease   Diogenes Mullins 38 y.o. male MRN: [id number]  Unit/Bed#: MICU 14 Encounter: [id number]      IMPRESSION & RECOMMENDATIONS:   Impression/Recommendations:  1.  Severe sepsis.  Tachycardia, tachypnea, leukocytosis, elevated lactic acid.  Secondary to #2.  Blood cultures are negative.     -Antibiotic plan as below  -Follow temperatures and hemodynamics  -Check CBC in AM to assess treatment response  -Supportive care per critical care service     2.  Perforated liver abscess with purulent peritonitis.  Status post exploratory laparotomy with drainage of abscess and four-quadrant peritoneal lavage.  Intraoperative findings included extensive four-quadrant purulent peritonitis with near frozen abdomen due to inflammation.  Unclear etiology.  No reported recent travel.  Abdominal CT with no other acute abnormalities including evidence of malignancy. RUQ US with no biliary abnormalities.  No known immunosuppressed state. Operative cultures with isolation of group F strep thus far.  Gram stain also with GNR's.     -Continue empiric vancomycin/cefepime/Flagyl for now  -Vancomycin dosing per pharmacy  -Follow-up final operative cultures and tailor antibiotics accordingly  -Follow-up pathology  -Surgery follow-up ongoing  -Serial abdominal exams     3.  Acute respiratory insufficiency.  Now extubated.     -Follow O2 requirements.  -Management as above     4.  Acute kidney injury.  In the setting of severe sepsis.  No acute  findings on CT.  Creatinine has improved.     -Recheck BMP in AM  -Dose adjust antibiotics based on renal function as indicated  -Volume management per critical care service     5.  Elevated LFTs.  In the setting of #1/#2.  No radiologic evidence of biliary obstruction.  LFTs continue to improve.     -Management as above  -Trend LFTs        Antibiotics:  Vancomycin/cefepime/Flagyl 3  POD2    Please contact our service with any immediate questions over the weekend.          Subjective:  No further high fevers.  Tolerating clears.  Stable blood pressures.  On 2 L NC.    Objective:  Vitals:  Temp:  [98 °F (36.7 °C)-99.9 °F (37.7 °C)] 98.6 °F (37 °C)  HR:  [108-118] 115  Resp:  [19-42] 33  BP: (118-140)/(58-75) 128/59  SpO2:  [90 %-95 %] 92 %  Temp (24hrs), Av.7 °F (37.1 °C), Min:98 °F (36.7 °C), Max:99.9 °F (37.7 °C)  Current: Temperature: 98.6 °F (37 °C)    Physical Exam:   General:  No acute distress  HEENT atraumatic normocephalic  Neck trachea midline  Psychiatric:  Awake and alert  Pulmonary:  Normal respiratory excursion without accessory muscle use  Abdomen: No rigidity or guarding, midline dressing is intact, SYMONE drains x 4 with serous output  Extremities:  No edema  Skin:  No rashes  Neuro moves all extremities spontaneously    Lab Results:  I have personally reviewed pertinent labs.  Results from last 7 days   Lab Units 24  0458 24  0435 24  1440 24  1339 24  1254 24  1208   POTASSIUM mmol/L 3.7 3.3* 3.6  --   --   --    CHLORIDE mmol/L 100 104 103  --   --   --    CO2 mmol/L 25 22 20*  --   --   --    CO2, I-STAT mmol/L  --   --   --  19* 20* 23   BUN mg/dL 30* 53* 61*  --   --   --    CREATININE mg/dL 0.78 1.44* 1.58*  --   --   --    EGFR ml/min/1.73sq m 113 60 54  --   --   --    GLUCOSE, ISTAT mg/dl  --   --   --  125 134 130   CALCIUM mg/dL 6.8* 7.0* 7.5*  --   --   --    AST U/L 90* 79* 85*  85*  --   --   --    ALT U/L 82* 75* 78*  78*  --   --   --    ALK PHOS U/L 106* 73 77  77  --   --   --      Results from last 7 days   Lab Units 24  0458 24  0435 24  1440   WBC Thousand/uL 20.81* 16.59* 21.29*   HEMOGLOBIN g/dL 10.5* 10.4* 11.7*   PLATELETS Thousands/uL 240 228 254     Results from last 7 days   Lab Units 24  1203 24  0835 24  0815 24  0415 24  0210 24  0150   BLOOD CULTURE   --  No Growth at 48 hrs. No Growth at 48 hrs.  --  No Growth at 48 hrs. No Growth at 48  hrs.   GRAM STAIN RESULT  4+ Polys*  4+ Gram negative rods*  1+ Gram positive cocci in pairs*  --   --   --   --   --    URINE CULTURE   --   --   --  No Growth <1000 cfu/mL  --   --    BODY FLUID CULTURE, STERILE  4+ Growth of Beta Hemolytic Streptococcus Group F*  --   --   --   --   --        Imaging Studies:   I have personally reviewed pertinent imaging study reports and images in PACS.    Chest x-ray from yesterday, personally reviewed, with right basilar atelectasis and left lower lobe atelectasis versus infiltrate with trace bilateral pleural effusions.    EKG, Pathology, and Other Studies:   I have personally reviewed pertinent reports.

## 2024-05-25 LAB
ALBUMIN SERPL BCP-MCNC: 2.2 G/DL (ref 3.5–5)
ALP SERPL-CCNC: 121 U/L (ref 34–104)
ALT SERPL W P-5'-P-CCNC: 68 U/L (ref 7–52)
ANION GAP SERPL CALCULATED.3IONS-SCNC: 6 MMOL/L (ref 4–13)
AST SERPL W P-5'-P-CCNC: 64 U/L (ref 13–39)
BACTERIA SPEC BFLD CULT: ABNORMAL
BASOPHILS # BLD MANUAL: 0 THOUSAND/UL (ref 0–0.1)
BASOPHILS NFR MAR MANUAL: 0 % (ref 0–1)
BILIRUB SERPL-MCNC: 1.62 MG/DL (ref 0.2–1)
BUN SERPL-MCNC: 21 MG/DL (ref 5–25)
CALCIUM ALBUM COR SERPL-MCNC: 8.5 MG/DL (ref 8.3–10.1)
CALCIUM SERPL-MCNC: 7.1 MG/DL (ref 8.4–10.2)
CHLORIDE SERPL-SCNC: 100 MMOL/L (ref 96–108)
CO2 SERPL-SCNC: 27 MMOL/L (ref 21–32)
CREAT SERPL-MCNC: 0.6 MG/DL (ref 0.6–1.3)
EOSINOPHIL # BLD MANUAL: 0.22 THOUSAND/UL (ref 0–0.4)
EOSINOPHIL NFR BLD MANUAL: 1 % (ref 0–6)
ERYTHROCYTE [DISTWIDTH] IN BLOOD BY AUTOMATED COUNT: 15.1 % (ref 11.6–15.1)
GFR SERPL CREATININE-BSD FRML MDRD: 126 ML/MIN/1.73SQ M
GLUCOSE SERPL-MCNC: 107 MG/DL (ref 65–140)
GRAM STN SPEC: ABNORMAL
HCT VFR BLD AUTO: 29.1 % (ref 36.5–49.3)
HGB BLD-MCNC: 9.6 G/DL (ref 12–17)
LYMPHOCYTES # BLD AUTO: 1.29 THOUSAND/UL (ref 0.6–4.47)
LYMPHOCYTES # BLD AUTO: 6 % (ref 14–44)
MCH RBC QN AUTO: 29.8 PG (ref 26.8–34.3)
MCHC RBC AUTO-ENTMCNC: 33 G/DL (ref 31.4–37.4)
MCV RBC AUTO: 90 FL (ref 82–98)
MONOCYTES # BLD AUTO: 0.86 THOUSAND/UL (ref 0–1.22)
MONOCYTES NFR BLD: 4 % (ref 4–12)
NEUTROPHILS # BLD MANUAL: 19.14 THOUSAND/UL (ref 1.85–7.62)
NEUTS BAND NFR BLD MANUAL: 5 % (ref 0–8)
NEUTS SEG NFR BLD AUTO: 84 % (ref 43–75)
PHOSPHATE SERPL-MCNC: 2.2 MG/DL (ref 2.7–4.5)
PLATELET # BLD AUTO: 308 THOUSANDS/UL (ref 149–390)
PLATELET BLD QL SMEAR: ADEQUATE
PMV BLD AUTO: 10.5 FL (ref 8.9–12.7)
POTASSIUM SERPL-SCNC: 4 MMOL/L (ref 3.5–5.3)
PROT SERPL-MCNC: 5.2 G/DL (ref 6.4–8.4)
RBC # BLD AUTO: 3.22 MILLION/UL (ref 3.88–5.62)
RBC MORPH BLD: PRESENT
SODIUM SERPL-SCNC: 133 MMOL/L (ref 135–147)
TARGETS BLD QL SMEAR: PRESENT
WBC # BLD AUTO: 21.51 THOUSAND/UL (ref 4.31–10.16)

## 2024-05-25 PROCEDURE — 84100 ASSAY OF PHOSPHORUS: CPT | Performed by: NURSE PRACTITIONER

## 2024-05-25 PROCEDURE — 85007 BL SMEAR W/DIFF WBC COUNT: CPT | Performed by: NURSE PRACTITIONER

## 2024-05-25 PROCEDURE — 85027 COMPLETE CBC AUTOMATED: CPT | Performed by: NURSE PRACTITIONER

## 2024-05-25 PROCEDURE — 99024 POSTOP FOLLOW-UP VISIT: CPT | Performed by: SURGERY

## 2024-05-25 PROCEDURE — 80053 COMPREHEN METABOLIC PANEL: CPT | Performed by: NURSE PRACTITIONER

## 2024-05-25 PROCEDURE — C9113 INJ PANTOPRAZOLE SODIUM, VIA: HCPCS | Performed by: NURSE PRACTITIONER

## 2024-05-25 RX ORDER — SCOLOPAMINE TRANSDERMAL SYSTEM 1 MG/1
1 PATCH, EXTENDED RELEASE TRANSDERMAL
Status: DISCONTINUED | OUTPATIENT
Start: 2024-05-25 | End: 2024-06-05 | Stop reason: HOSPADM

## 2024-05-25 RX ADMIN — GABAPENTIN 100 MG: 100 CAPSULE ORAL at 20:25

## 2024-05-25 RX ADMIN — ONDANSETRON 4 MG: 2 INJECTION INTRAMUSCULAR; INTRAVENOUS at 09:10

## 2024-05-25 RX ADMIN — ACETAMINOPHEN 975 MG: 325 TABLET, FILM COATED ORAL at 22:30

## 2024-05-25 RX ADMIN — CEFEPIME 2000 MG: 2 INJECTION, POWDER, FOR SOLUTION INTRAVENOUS at 09:21

## 2024-05-25 RX ADMIN — ONDANSETRON 4 MG: 2 INJECTION INTRAMUSCULAR; INTRAVENOUS at 05:06

## 2024-05-25 RX ADMIN — METRONIDAZOLE 500 MG: 500 INJECTION, SOLUTION INTRAVENOUS at 12:24

## 2024-05-25 RX ADMIN — METHOCARBAMOL 500 MG: 500 TABLET ORAL at 18:30

## 2024-05-25 RX ADMIN — ONDANSETRON 4 MG: 2 INJECTION INTRAMUSCULAR; INTRAVENOUS at 18:36

## 2024-05-25 RX ADMIN — SODIUM CHLORIDE, SODIUM GLUCONATE, SODIUM ACETATE, POTASSIUM CHLORIDE, MAGNESIUM CHLORIDE, SODIUM PHOSPHATE, DIBASIC, AND POTASSIUM PHOSPHATE 100 ML/HR: .53; .5; .37; .037; .03; .012; .00082 INJECTION, SOLUTION INTRAVENOUS at 01:59

## 2024-05-25 RX ADMIN — SODIUM PHOSPHATE, MONOBASIC, MONOHYDRATE AND SODIUM PHOSPHATE, DIBASIC, ANHYDROUS 30 MMOL: 142; 276 INJECTION, SOLUTION INTRAVENOUS at 14:44

## 2024-05-25 RX ADMIN — VANCOMYCIN HYDROCHLORIDE 1250 MG: 10 INJECTION, POWDER, LYOPHILIZED, FOR SOLUTION INTRAVENOUS at 01:54

## 2024-05-25 RX ADMIN — METHOCARBAMOL 500 MG: 500 TABLET ORAL at 04:53

## 2024-05-25 RX ADMIN — OXYCODONE HYDROCHLORIDE 10 MG: 10 TABLET ORAL at 09:19

## 2024-05-25 RX ADMIN — SODIUM CHLORIDE, SODIUM GLUCONATE, SODIUM ACETATE, POTASSIUM CHLORIDE, MAGNESIUM CHLORIDE, SODIUM PHOSPHATE, DIBASIC, AND POTASSIUM PHOSPHATE 100 ML/HR: .53; .5; .37; .037; .03; .012; .00082 INJECTION, SOLUTION INTRAVENOUS at 23:51

## 2024-05-25 RX ADMIN — GABAPENTIN 100 MG: 100 CAPSULE ORAL at 18:30

## 2024-05-25 RX ADMIN — OXYCODONE HYDROCHLORIDE 10 MG: 10 TABLET ORAL at 22:30

## 2024-05-25 RX ADMIN — SODIUM CHLORIDE 3 G: 9 INJECTION, SOLUTION INTRAVENOUS at 20:25

## 2024-05-25 RX ADMIN — GABAPENTIN 100 MG: 100 CAPSULE ORAL at 09:20

## 2024-05-25 RX ADMIN — SCOPOLAMINE 1 PATCH: 1.5 PATCH, EXTENDED RELEASE TRANSDERMAL at 13:59

## 2024-05-25 RX ADMIN — PANTOPRAZOLE SODIUM 40 MG: 40 INJECTION, POWDER, FOR SOLUTION INTRAVENOUS at 09:11

## 2024-05-25 RX ADMIN — ACETAMINOPHEN 975 MG: 325 TABLET, FILM COATED ORAL at 04:52

## 2024-05-25 RX ADMIN — ONDANSETRON 4 MG: 2 INJECTION INTRAMUSCULAR; INTRAVENOUS at 00:00

## 2024-05-25 RX ADMIN — HEPARIN SODIUM 5000 UNITS: 5000 INJECTION INTRAVENOUS; SUBCUTANEOUS at 04:53

## 2024-05-25 RX ADMIN — HEPARIN SODIUM 5000 UNITS: 5000 INJECTION INTRAVENOUS; SUBCUTANEOUS at 13:57

## 2024-05-25 RX ADMIN — SODIUM CHLORIDE 3 G: 9 INJECTION, SOLUTION INTRAVENOUS at 14:05

## 2024-05-25 RX ADMIN — ACETAMINOPHEN 975 MG: 325 TABLET, FILM COATED ORAL at 14:02

## 2024-05-25 RX ADMIN — OXYCODONE HYDROCHLORIDE 10 MG: 10 TABLET ORAL at 18:38

## 2024-05-25 RX ADMIN — METRONIDAZOLE 500 MG: 500 INJECTION, SOLUTION INTRAVENOUS at 04:52

## 2024-05-25 RX ADMIN — ONDANSETRON 4 MG: 2 INJECTION INTRAMUSCULAR; INTRAVENOUS at 13:54

## 2024-05-25 RX ADMIN — ONDANSETRON 4 MG: 2 INJECTION INTRAMUSCULAR; INTRAVENOUS at 22:33

## 2024-05-25 RX ADMIN — METHOCARBAMOL 500 MG: 500 TABLET ORAL at 12:23

## 2024-05-25 RX ADMIN — OXYCODONE HYDROCHLORIDE 10 MG: 10 TABLET ORAL at 14:01

## 2024-05-25 RX ADMIN — HEPARIN SODIUM 5000 UNITS: 5000 INJECTION INTRAVENOUS; SUBCUTANEOUS at 22:33

## 2024-05-25 RX ADMIN — HYDROMORPHONE HYDROCHLORIDE 1 MG: 1 INJECTION, SOLUTION INTRAMUSCULAR; INTRAVENOUS; SUBCUTANEOUS at 03:10

## 2024-05-25 NOTE — PLAN OF CARE
Problem: PAIN - ADULT  Goal: Verbalizes/displays adequate comfort level or baseline comfort level  Description: Interventions:  - Encourage patient to monitor pain and request assistance  - Assess pain using appropriate pain scale  - Administer analgesics based on type and severity of pain and evaluate response  - Implement non-pharmacological measures as appropriate and evaluate response  - Consider cultural and social influences on pain and pain management  - Notify physician/advanced practitioner if interventions unsuccessful or patient reports new pain  Outcome: Progressing     Problem: INFECTION - ADULT  Goal: Absence or prevention of progression during hospitalization  Description: INTERVENTIONS:  - Assess and monitor for signs and symptoms of infection  - Monitor lab/diagnostic results  - Monitor all insertion sites, i.e. indwelling lines, tubes, and drains  - Monitor endotracheal if appropriate and nasal secretions for changes in amount and color  - [place] appropriate cooling/warming therapies per order  - Administer medications as ordered  - Instruct and encourage patient and family to use good hand hygiene technique  - Identify and instruct in appropriate isolation precautions for identified infection/condition  Outcome: Progressing  Goal: Absence of fever/infection during neutropenic period  Description: INTERVENTIONS:  - Monitor WBC    Outcome: Progressing

## 2024-05-25 NOTE — RESTORATIVE TECHNICIAN NOTE
Restorative Technician Note      Patient Name: Diogenes Mullins     Restorative Tech Visit Date: 05/25/24  Note Type: Mobility  Patient Position Upon Consult: Bedside chair  Activity Performed: Ambulated  Assistive Device: Roller walker  Patient Position at End of Consult: All needs within reach; Bedside chair

## 2024-05-25 NOTE — QUICK NOTE
Cultures reviewed and noted to be growing Group F Strep and Prevotella.  Will narrow antibiotics to Unasyn.  Continue to trend fevers, WBC.  ID will see again Monday 5/27.  Please call in the interim with new questions.

## 2024-05-25 NOTE — PROGRESS NOTES
Diogenes Mullins is a 38 y.o. male who is currently ordered Vancomycin IV with management by the Pharmacy Consult service.  Relevant clinical data and objective / subjective history reviewed.  Vancomycin Assessment:  Indication and Goal AUC/Trough: Other, liver abcess, -600, trough >10  Clinical Status: stable  Micro:     Renal Function:  SCr: 0.78 mg/dL  CrCl: 162.1 mL/min  Renal replacement: Not on dialysis  Days of Therapy: 4  Current Dose: 1250mg iv q8h  Vancomycin Plan:  New Dosing: continue current dose  Estimated AUC: 499 mcg*hr/mL  Estimated Trough: 15.1 mcg/mL  Next Level: 5/30/24 am labs  Renal Function Monitoring: Daily BMP and UOP  Pharmacy will continue to follow closely for s/sx of nephrotoxicity, infusion reactions and appropriateness of therapy.  BMP and CBC will be ordered per protocol. We will continue to follow the patient’s culture results and clinical progress daily.    Yoana Dill, Pharmacist

## 2024-05-25 NOTE — PROGRESS NOTES
"General Surgery  Progress Note   Diogenes Mullins 38 y.o. male MRN: [id number]  Unit/Bed#: [facility] 925-01 Encounter: [id number]    Assessment:  38-year-old male with perforated hepatic abscess status post  diagnostic laparoscopy converted to exploratory laparotomy with drainage of hepatic abscess, four-quadrant peritoneal lavage 2/2 purulent peritonitis and drain placements.    Afebrile. Tachycardic. On 3L NC    UOP: 2125 mL  LUQ SYMONE (splenic recess) 75 cc  LLQ SYMONE (pelvis)  70 cc  RUQ SYMONE (suprahepatic) 65 cc  RLQ SYMONE (subhepatic) 55 cc    WBC pending (20.81)  Hgb pending (10.5)  Cr pending (0.78)     blood cx: NGTD  OR cultures: B hemolytic strep group F, Prevotella     Plan:  CLD as tolerated, if signs of ileus, will make patient NPO  Continue cefepime, Flagyl, Vanc  ID following, appreciate recommendations  Midline earlene replaced this morning  Maintain SYMONE drains x4 to bulb suction  Anticipate requiring repeat scanning in next several days   Monitor leukocytosis - follow up AM labs  Serial abdominal exams  DVT ppx: Heparin  Pain/ nausea control PRN  OOB/ ambulation       Subjective/Objective     Subjective:   Patient seen and examined at bedside, in no acute distress. No acute events overnight. Had some nausea without vomiting overnight. Denies flatus or BM    Objective:   Vitals:Blood pressure 165/87, pulse 94, temperature 99.3 °F (37.4 °C), resp. rate 18, height 5' 10\" (1.778 m), weight 118 kg (260 lb 2.3 oz), SpO2 93%.  Temp (24hrs), Av.3 °F (36.8 °C), Min:97.5 °F (36.4 °C), Max:99.3 °F (37.4 °C)        Intake/Output Summary (Last 24 hours) at 2024 0700  Last data filed at 2024 0501  Gross per 24 hour   Intake 2078.33 ml   Output 2390 ml   Net -311.67 ml       Invasive Devices       Peripheral Intravenous Line  Duration             Peripheral IV 24 Left Wrist 1 day    Peripheral IV 24 Left;Ventral (anterior) Forearm <1 day              Drain  Duration             Closed/Suction Drain " "LLQ Bulb 19 Fr. 2 days    Closed/Suction Drain LUQ Bulb 19 Fr. 2 days    Closed/Suction Drain Right RLQ Bulb 19 Fr. 2 days    Closed/Suction Drain Right RUQ Bulb 19 Fr. 2 days                    Physical Exam:  General: No acute distress, alert and oriented   CV: Well perfused, tachycardic   Lungs: no respiratory distress, tachypnea  Abdomen: Soft, distended. Incision cdi with earlene in place, replaced this morning and covered with abd pad. SYMONE x4 serous appearing  Extremities: No edema, clubbing or cyanosis  Skin: Warm, dry    Lab Results: BMP/CMP:   No results found for: \"SODIUM\", \"K\", \"CL\", \"CO2\", \"ANIONGAP\", \"BUN\", \"CREATININE\", \"GLUCOSE\", \"CALCIUM\", \"AST\", \"ALT\", \"ALKPHOS\", \"PROT\", \"BILITOT\", \"EGFR\"   and CBC:   No results found for: \"WBC\", \"HGB\", \"HCT\", \"MCV\", \"PLT\", \"ADJUSTEDWBC\", \"RBC\", \"MCH\", \"MCHC\", \"RDW\", \"MPV\", \"NRBC\"    VTE Prophylaxis: Sequential compression device (Venodyne)  and Heparin    Kerri Oglesby MD  5/25/2024    "

## 2024-05-26 ENCOUNTER — APPOINTMENT (INPATIENT)
Dept: RADIOLOGY | Facility: HOSPITAL | Age: 39
DRG: 710 | End: 2024-05-26
Payer: COMMERCIAL

## 2024-05-26 ENCOUNTER — APPOINTMENT (INPATIENT)
Dept: RADIOLOGY | Facility: HOSPITAL | Age: 39
DRG: 710 | End: 2024-05-26
Attending: STUDENT IN AN ORGANIZED HEALTH CARE EDUCATION/TRAINING PROGRAM
Payer: COMMERCIAL

## 2024-05-26 LAB
ALBUMIN SERPL BCP-MCNC: 2.2 G/DL (ref 3.5–5)
ALBUMIN SERPL BCP-MCNC: 2.2 G/DL (ref 3.5–5)
ALP SERPL-CCNC: 128 U/L (ref 34–104)
ALP SERPL-CCNC: 151 U/L (ref 34–104)
ALT SERPL W P-5'-P-CCNC: 67 U/L (ref 7–52)
ALT SERPL W P-5'-P-CCNC: 69 U/L (ref 7–52)
ANION GAP SERPL CALCULATED.3IONS-SCNC: 8 MMOL/L (ref 4–13)
AST SERPL W P-5'-P-CCNC: 64 U/L (ref 13–39)
AST SERPL W P-5'-P-CCNC: 73 U/L (ref 13–39)
BACTERIA BLD CULT: NORMAL
BACTERIA BLD CULT: NORMAL
BILIRUB DIRECT SERPL-MCNC: 0.55 MG/DL (ref 0–0.2)
BILIRUB DIRECT SERPL-MCNC: 0.61 MG/DL (ref 0–0.2)
BILIRUB SERPL-MCNC: 1.35 MG/DL (ref 0.2–1)
BILIRUB SERPL-MCNC: 1.36 MG/DL (ref 0.2–1)
BUN SERPL-MCNC: 16 MG/DL (ref 5–25)
CALCIUM SERPL-MCNC: 6.7 MG/DL (ref 8.4–10.2)
CHLORIDE SERPL-SCNC: 98 MMOL/L (ref 96–108)
CO2 SERPL-SCNC: 27 MMOL/L (ref 21–32)
CREAT SERPL-MCNC: 0.55 MG/DL (ref 0.6–1.3)
ERYTHROCYTE [DISTWIDTH] IN BLOOD BY AUTOMATED COUNT: 15.3 % (ref 11.6–15.1)
GFR SERPL CREATININE-BSD FRML MDRD: 131 ML/MIN/1.73SQ M
GLUCOSE SERPL-MCNC: 112 MG/DL (ref 65–140)
HCT VFR BLD AUTO: 27.5 % (ref 36.5–49.3)
HGB BLD-MCNC: 8.7 G/DL (ref 12–17)
MAGNESIUM SERPL-MCNC: 2.3 MG/DL (ref 1.9–2.7)
MCH RBC QN AUTO: 29.3 PG (ref 26.8–34.3)
MCHC RBC AUTO-ENTMCNC: 31.6 G/DL (ref 31.4–37.4)
MCV RBC AUTO: 93 FL (ref 82–98)
NRBC BLD AUTO-RTO: 0 /100 WBCS
PHOSPHATE SERPL-MCNC: 2.5 MG/DL (ref 2.7–4.5)
PLATELET # BLD AUTO: 388 THOUSANDS/UL (ref 149–390)
PMV BLD AUTO: 10.1 FL (ref 8.9–12.7)
POTASSIUM SERPL-SCNC: 4.2 MMOL/L (ref 3.5–5.3)
PROT SERPL-MCNC: 5.1 G/DL (ref 6.4–8.4)
PROT SERPL-MCNC: 5.2 G/DL (ref 6.4–8.4)
RBC # BLD AUTO: 2.97 MILLION/UL (ref 3.88–5.62)
SODIUM SERPL-SCNC: 133 MMOL/L (ref 135–147)
WBC # BLD AUTO: 22.54 THOUSAND/UL (ref 4.31–10.16)

## 2024-05-26 PROCEDURE — 71260 CT THORAX DX C+: CPT

## 2024-05-26 PROCEDURE — NC001 PR NO CHARGE: Performed by: STUDENT IN AN ORGANIZED HEALTH CARE EDUCATION/TRAINING PROGRAM

## 2024-05-26 PROCEDURE — 74177 CT ABD & PELVIS W/CONTRAST: CPT

## 2024-05-26 PROCEDURE — 0F9030Z DRAINAGE OF LIVER WITH DRAINAGE DEVICE, PERCUTANEOUS APPROACH: ICD-10-PCS | Performed by: STUDENT IN AN ORGANIZED HEALTH CARE EDUCATION/TRAINING PROGRAM

## 2024-05-26 PROCEDURE — 84100 ASSAY OF PHOSPHORUS: CPT | Performed by: STUDENT IN AN ORGANIZED HEALTH CARE EDUCATION/TRAINING PROGRAM

## 2024-05-26 PROCEDURE — 87181 SC STD AGAR DILUTION PER AGT: CPT

## 2024-05-26 PROCEDURE — 83735 ASSAY OF MAGNESIUM: CPT | Performed by: STUDENT IN AN ORGANIZED HEALTH CARE EDUCATION/TRAINING PROGRAM

## 2024-05-26 PROCEDURE — C1769 GUIDE WIRE: HCPCS

## 2024-05-26 PROCEDURE — 87185 SC STD ENZYME DETCJ PER NZM: CPT

## 2024-05-26 PROCEDURE — 99024 POSTOP FOLLOW-UP VISIT: CPT | Performed by: SURGERY

## 2024-05-26 PROCEDURE — 99152 MOD SED SAME PHYS/QHP 5/>YRS: CPT | Performed by: STUDENT IN AN ORGANIZED HEALTH CARE EDUCATION/TRAINING PROGRAM

## 2024-05-26 PROCEDURE — 87147 CULTURE TYPE IMMUNOLOGIC: CPT

## 2024-05-26 PROCEDURE — 94760 N-INVAS EAR/PLS OXIMETRY 1: CPT

## 2024-05-26 PROCEDURE — 80076 HEPATIC FUNCTION PANEL: CPT

## 2024-05-26 PROCEDURE — 85027 COMPLETE CBC AUTOMATED: CPT | Performed by: STUDENT IN AN ORGANIZED HEALTH CARE EDUCATION/TRAINING PROGRAM

## 2024-05-26 PROCEDURE — 49405 IMAGE CATH FLUID COLXN VISC: CPT | Performed by: STUDENT IN AN ORGANIZED HEALTH CARE EDUCATION/TRAINING PROGRAM

## 2024-05-26 PROCEDURE — 87070 CULTURE OTHR SPECIMN AEROBIC: CPT

## 2024-05-26 PROCEDURE — 80076 HEPATIC FUNCTION PANEL: CPT | Performed by: STUDENT IN AN ORGANIZED HEALTH CARE EDUCATION/TRAINING PROGRAM

## 2024-05-26 PROCEDURE — C1729 CATH, DRAINAGE: HCPCS

## 2024-05-26 PROCEDURE — C9113 INJ PANTOPRAZOLE SODIUM, VIA: HCPCS | Performed by: NURSE PRACTITIONER

## 2024-05-26 PROCEDURE — 80048 BASIC METABOLIC PNL TOTAL CA: CPT | Performed by: STUDENT IN AN ORGANIZED HEALTH CARE EDUCATION/TRAINING PROGRAM

## 2024-05-26 PROCEDURE — 71045 X-RAY EXAM CHEST 1 VIEW: CPT

## 2024-05-26 PROCEDURE — 87075 CULTR BACTERIA EXCEPT BLOOD: CPT

## 2024-05-26 PROCEDURE — 87076 CULTURE ANAEROBE IDENT EACH: CPT

## 2024-05-26 PROCEDURE — 87205 SMEAR GRAM STAIN: CPT

## 2024-05-26 RX ORDER — LEVALBUTEROL INHALATION SOLUTION 1.25 MG/3ML
1.25 SOLUTION RESPIRATORY (INHALATION) ONCE
Status: COMPLETED | OUTPATIENT
Start: 2024-05-26 | End: 2024-05-26

## 2024-05-26 RX ORDER — FENTANYL CITRATE 50 UG/ML
INJECTION, SOLUTION INTRAMUSCULAR; INTRAVENOUS AS NEEDED
Status: COMPLETED | OUTPATIENT
Start: 2024-05-26 | End: 2024-05-26

## 2024-05-26 RX ORDER — SODIUM CHLORIDE 9 MG/ML
10 INJECTION, SOLUTION INTRAMUSCULAR; INTRAVENOUS; SUBCUTANEOUS DAILY
Qty: 300 ML | Refills: 3 | Status: SHIPPED | OUTPATIENT
Start: 2024-05-26 | End: 2024-09-23

## 2024-05-26 RX ORDER — MIDAZOLAM HYDROCHLORIDE 2 MG/2ML
INJECTION, SOLUTION INTRAMUSCULAR; INTRAVENOUS AS NEEDED
Status: COMPLETED | OUTPATIENT
Start: 2024-05-26 | End: 2024-05-26

## 2024-05-26 RX ORDER — ALBUTEROL SULFATE 2.5 MG/3ML
2.5 SOLUTION RESPIRATORY (INHALATION) EVERY 6 HOURS PRN
Status: DISCONTINUED | OUTPATIENT
Start: 2024-05-26 | End: 2024-05-27

## 2024-05-26 RX ORDER — LEVALBUTEROL INHALATION SOLUTION 1.25 MG/3ML
1.25 SOLUTION RESPIRATORY (INHALATION) EVERY 6 HOURS PRN
Status: DISCONTINUED | OUTPATIENT
Start: 2024-05-26 | End: 2024-05-26

## 2024-05-26 RX ORDER — LIDOCAINE WITH 8.4% SOD BICARB 0.9%(10ML)
SYRINGE (ML) INJECTION AS NEEDED
Status: COMPLETED | OUTPATIENT
Start: 2024-05-26 | End: 2024-05-26

## 2024-05-26 RX ADMIN — SODIUM CHLORIDE 3 G: 9 INJECTION, SOLUTION INTRAVENOUS at 02:36

## 2024-05-26 RX ADMIN — OXYCODONE HYDROCHLORIDE 10 MG: 10 TABLET ORAL at 17:38

## 2024-05-26 RX ADMIN — LEVALBUTEROL HYDROCHLORIDE 1.25 MG: 1.25 SOLUTION RESPIRATORY (INHALATION) at 07:48

## 2024-05-26 RX ADMIN — ONDANSETRON 4 MG: 2 INJECTION INTRAMUSCULAR; INTRAVENOUS at 02:41

## 2024-05-26 RX ADMIN — OXYCODONE HYDROCHLORIDE 5 MG: 5 TABLET ORAL at 13:28

## 2024-05-26 RX ADMIN — METHOCARBAMOL 500 MG: 500 TABLET ORAL at 12:19

## 2024-05-26 RX ADMIN — ONDANSETRON 4 MG: 2 INJECTION INTRAMUSCULAR; INTRAVENOUS at 07:05

## 2024-05-26 RX ADMIN — HEPARIN SODIUM 5000 UNITS: 5000 INJECTION INTRAVENOUS; SUBCUTANEOUS at 06:01

## 2024-05-26 RX ADMIN — MIDAZOLAM 1 MG: 1 INJECTION INTRAMUSCULAR; INTRAVENOUS at 15:00

## 2024-05-26 RX ADMIN — MIDAZOLAM 1 MG: 1 INJECTION INTRAMUSCULAR; INTRAVENOUS at 15:06

## 2024-05-26 RX ADMIN — OXYCODONE HYDROCHLORIDE 5 MG: 5 TABLET ORAL at 12:18

## 2024-05-26 RX ADMIN — SODIUM CHLORIDE 3 G: 9 INJECTION, SOLUTION INTRAVENOUS at 16:39

## 2024-05-26 RX ADMIN — Medication 10 ML: at 15:22

## 2024-05-26 RX ADMIN — METHOCARBAMOL 500 MG: 500 TABLET ORAL at 00:43

## 2024-05-26 RX ADMIN — GABAPENTIN 100 MG: 100 CAPSULE ORAL at 22:27

## 2024-05-26 RX ADMIN — ONDANSETRON 4 MG: 2 INJECTION INTRAMUSCULAR; INTRAVENOUS at 22:33

## 2024-05-26 RX ADMIN — METHOCARBAMOL 500 MG: 500 TABLET ORAL at 23:56

## 2024-05-26 RX ADMIN — METHOCARBAMOL 500 MG: 500 TABLET ORAL at 17:38

## 2024-05-26 RX ADMIN — ACETAMINOPHEN 975 MG: 325 TABLET, FILM COATED ORAL at 22:26

## 2024-05-26 RX ADMIN — PANTOPRAZOLE SODIUM 40 MG: 40 INJECTION, POWDER, FOR SOLUTION INTRAVENOUS at 09:31

## 2024-05-26 RX ADMIN — FENTANYL CITRATE 50 MCG: 50 INJECTION INTRAMUSCULAR; INTRAVENOUS at 15:06

## 2024-05-26 RX ADMIN — METHOCARBAMOL 500 MG: 500 TABLET ORAL at 06:00

## 2024-05-26 RX ADMIN — HEPARIN SODIUM 5000 UNITS: 5000 INJECTION INTRAVENOUS; SUBCUTANEOUS at 16:36

## 2024-05-26 RX ADMIN — HYDROMORPHONE HYDROCHLORIDE 1 MG: 1 INJECTION, SOLUTION INTRAMUSCULAR; INTRAVENOUS; SUBCUTANEOUS at 16:25

## 2024-05-26 RX ADMIN — GABAPENTIN 100 MG: 100 CAPSULE ORAL at 09:31

## 2024-05-26 RX ADMIN — MIDAZOLAM 1 MG: 1 INJECTION INTRAMUSCULAR; INTRAVENOUS at 15:15

## 2024-05-26 RX ADMIN — OXYCODONE HYDROCHLORIDE 10 MG: 10 TABLET ORAL at 07:05

## 2024-05-26 RX ADMIN — FENTANYL CITRATE 50 MCG: 50 INJECTION INTRAMUSCULAR; INTRAVENOUS at 15:00

## 2024-05-26 RX ADMIN — ONDANSETRON 4 MG: 2 INJECTION INTRAMUSCULAR; INTRAVENOUS at 12:20

## 2024-05-26 RX ADMIN — ACETAMINOPHEN 975 MG: 325 TABLET, FILM COATED ORAL at 13:22

## 2024-05-26 RX ADMIN — GABAPENTIN 100 MG: 100 CAPSULE ORAL at 16:25

## 2024-05-26 RX ADMIN — ACETAMINOPHEN 975 MG: 325 TABLET, FILM COATED ORAL at 06:00

## 2024-05-26 RX ADMIN — SODIUM CHLORIDE 3 G: 9 INJECTION, SOLUTION INTRAVENOUS at 22:35

## 2024-05-26 RX ADMIN — OXYCODONE HYDROCHLORIDE 10 MG: 10 TABLET ORAL at 02:41

## 2024-05-26 RX ADMIN — FENTANYL CITRATE 50 MCG: 50 INJECTION INTRAMUSCULAR; INTRAVENOUS at 15:15

## 2024-05-26 RX ADMIN — IOHEXOL 100 ML: 350 INJECTION, SOLUTION INTRAVENOUS at 08:44

## 2024-05-26 RX ADMIN — HEPARIN SODIUM 5000 UNITS: 5000 INJECTION INTRAVENOUS; SUBCUTANEOUS at 22:28

## 2024-05-26 RX ADMIN — OXYCODONE HYDROCHLORIDE 10 MG: 10 TABLET ORAL at 22:27

## 2024-05-26 RX ADMIN — SODIUM CHLORIDE 3 G: 9 INJECTION, SOLUTION INTRAVENOUS at 09:18

## 2024-05-26 NOTE — PROGRESS NOTES
"Progress Note - General Surgery  Diogenes Mullins 38 y.o. male MRN: [id number]  Unit/Bed#: [facility] 925-01 Encounter: [id number]    Assessment:  38 y.o. male with perforated hepatic abscess status post 5/22 diagnostic laparoscopy converted to exploratory laparotomy with drainage of hepatic abscess, four-quadrant peritoneal lavage 2/2 purulent peritonitis and drain placements.     Patient continues to have low-grade fevers of Tmax 100.7.  WBC remains elevated at 22.5 from 21.5.  Patient appears dyspneic this morning with wheezing, while requiring 1 L nasal cannula to maintain his saturations.  Operative cultures positive for Prevotella and group F beta-hemolytic strep.  This morning chest x-ray shows low lung volumes with no pleural effusions.    Plan:  - Diet Clear Liquid  - Pain and Nausea control PRN  - Incentive spirometry  - OOB, ambulate  -Start nebulized Xopenex treatments  - Wean supplemental O2 as able  - Continue Unasyn per ID recommendations  - Will likely need CT scan 5/27 to reassess intra-abdominal collections if WBC and fevers do not improve  - Will need outpatient colonoscopy  - Daily packing change  - Monitor SYMONE output  - Please [facility] text the [facility] surgery resident role with any concerns    Subjective/Objective     Subjective: Still has abdominal pain.  Having some episodes of nausea without emesis.  Nausea has resolved.  Feels SOB this morning.  Ambulating.  Having flatus and bowel movements.      Objective:   Vitals: Blood pressure 130/77, pulse (!) 114, temperature 100.1 °F (37.8 °C), resp. rate 18, height 5' 10\" (1.778 m), weight 118 kg (260 lb 2.3 oz), SpO2 97%.,Body mass index is 37.33 kg/m².    I/O         05/24 0701  05/25 0700 05/25 0701  05/26 0700 05/26 0701  05/27 0700    P.O. 480 1200     I.V. (mL/kg) 1098.3 (9.3) 2011 (17)     IV Piggyback 500 150     Total Intake(mL/kg) 2078.3 (17.6) 3361 (28.5)     Urine (mL/kg/hr) 2125 (0.8) 1100 (0.4)     Drains 265 95     Total Output 2390 1195     Net " "-311.7 +2166                    Physical Exam:  Gen: NAD, Aox3, Comfortable in bed  Chest: Normal work of breathing, no respiratory distress  Abd: Soft, distended.  Tender mostly in the RUQ.  Surgical sites are packed with plain gauze.  RLQ drain 10 cc, RUQ drain 30 cc, LLQ drain 20 cc, L UQ drain 35 cc.  Right drains are serous, left drain serosanguineous.  Ext: No Edema  Skin: Warm, Dry, Intact      Lab, Imaging and other studies: I have personally reviewed pertinent reports.  , CBC with diff:   Lab Results   Component Value Date    WBC 22.54 (H) 05/26/2024    HGB 8.7 (L) 05/26/2024    HCT 27.5 (L) 05/26/2024    MCV 93 05/26/2024     05/26/2024    RBC 2.97 (L) 05/26/2024    MCH 29.3 05/26/2024    MCHC 31.6 05/26/2024    RDW 15.3 (H) 05/26/2024    MPV 10.1 05/26/2024    NRBC 0 05/26/2024   , BMP/CMP:   Lab Results   Component Value Date    SODIUM 133 (L) 05/25/2024    K 4.0 05/25/2024     05/25/2024    CO2 27 05/25/2024    BUN 21 05/25/2024    CREATININE 0.60 05/25/2024    CALCIUM 7.1 (L) 05/25/2024    AST 64 (H) 05/25/2024    ALT 68 (H) 05/25/2024    ALKPHOS 121 (H) 05/25/2024    EGFR 126 05/25/2024   , Magnesium: No components found for: \"MAG\"  VTE Pharmacologic Prophylaxis: Heparin  VTE Mechanical Prophylaxis: sequential compression device        Maria E Rojo MD  5/26/2024  7:02 AM    "

## 2024-05-26 NOTE — NURSING NOTE
Patient's temperature elevated. Deep breathing taught and patient doing so hourly when awake. Small amounts of clear sputum expectorated. Up to walk in halls with writer.

## 2024-05-26 NOTE — DISCHARGE INSTRUCTIONS
"     TUBE CARE INSTRUCTIONS    Care after your procedure:    Resume your normal diet. Small sips of flat soda will help with nausea.    1. The properly functioning catheter should be forward flushed once (1x) daily with 10ml of normal saline using clean technique. You will be given a prescription for flushes.   To flush the tube, clean both connections with alcohol swab.Twist off the drainage bag/ bulb  tubing and twist the saline syringe into the drainage tube and flush. Remove the syringe and twist the drainage bag / bulb tubing tubing back on.    2. The drainage bag/bulb may be emptied as necessary. Keep a record of the amount of fluid you drain from your tube. This should be done with clean technique as well.     3. A fresh dressing should be applied daily over the tube insertion site.     4. As the tube is secured to the skin with only a suture,try not to pull on your tube. Tub baths are not permitted. Showers are permitted if the patient's skin entry site is prevented from getting wet. Similarly, washcloth \"baths\" are acceptable.     Contact Interventional Radiology at [phone] ([place] PATIENTS: Contact Interventional Radiology at [phone]) ([place] PATIENTS: Contact Interventional Radiology at [phone]) if:    1. Leakage or large amounts of liquid around the catheter.    2. Fever of 101 degrees lasting several hours without other obvious cause (such as sore throat, flu, etc).    3. Persistent nausea or vomiting.    4. Diminished drainage, which may be associated with pressure or pain. Or when the     drainage from your tube is less than 10mls for 48 hours.    5. Catheter pulled back or falls out.      The following pharmacies carry the flush syringes.       [facility]                     [facility] [address]                     [address] [place] [address]                         [phone]  Nima Stephens " [place]  Phone [phone]            Phone [phone]                                                                                                       [facility]   [facility]             [facility]                                [phone]  [address]                       [address] [facility]  [facility]  Phone [phone]            Phone [phone]                      [place] [place]                                                                                                          [phone]  [facility]  [address]  [facility]  Phone [phone]           Moderate Sedation   WHAT YOU NEED TO KNOW:   Moderate sedation, or conscious sedation, is medicine used during procedures to help you feel relaxed and calm. You will be awake and able to follow directions without anxiety or pain. You will remember little to none of the procedure. You may feel tired, weak, or unsteady on your feet after you get sedation. You may also have trouble concentrating or short-term memory loss. These symptoms should go away in 24 hours or less.   DISCHARGE INSTRUCTIONS:   Call 911 or have someone else call for any of the following:   You have sudden trouble breathing.     You cannot be woken.  Seek care immediately if:   You have a severe headache or dizziness.     Your heart is beating faster than usual.  Contact your healthcare provider if:   You have a fever.     You have nausea or are vomiting for more than 8 hours after the procedure.      Your skin is itchy, swollen, or you have a rash.     You have questions or concerns about your condition or care.  Self-care:   Have someone stay with you for 24 hours. This person can drive you to errands and help you do things around the house. This  person can also watch for problems.      Rest and do quiet activities for 24 hours. Do not exercise, ride a bike, or play sports. Stand up slowly to prevent dizziness and falls. Take short walks around the house with another person. Slowly return to your usual activities the next day.      Do not drive or use dangerous machines or tools for 24 hours. You may injure yourself or others. Examples include a lawnmower, saw, or drill. Do not return to work for 24 hours if you use dangerous machines or tools for work.      Do not make important decisions for 24 hours. For example, do not sign important papers or invest money.      Drink liquids as directed. Liquids help flush the sedation medicine out of your body. Ask how much liquid to drink each day and which liquids are best for you.      Eat small, frequent meals to prevent nausea and vomiting. Start with clear liquids such as juice or broth. If you do not vomit after clear liquids, you can eat your usual foods.      Do not drink alcohol or take medicines that make you drowsy. This includes medicines that help you sleep and anxiety medicines. Ask your healthcare provider if it is safe for you to take pain medicine.  Follow up with your healthcare provider as directed: Write down your questions so you remember to ask them during your visits.   © 2017 "[employer]" Information is for End User's use only and may not be sold, redistributed or otherwise used for commercial purposes. All illustrations and images included in CareNotes® are the copyrighted property of [employer], [employer]. or [employer].  The above information is an  only. It is not intended as medical advice for individual conditions or treatments. Talk to your doctor, nurse or pharmacist before following any medical regimen to see if it is safe and effective for you.

## 2024-05-26 NOTE — SEDATION DOCUMENTATION
Drainage tube placement performed by Dr. Pardo. Patient tolerated well.  270 mL of dark red fluid remvoed and sent for cultures.  Report called to Nanette LAZAR. .IR Procedure Bedrest Start Time is 1545.

## 2024-05-26 NOTE — CONSULTS
Vancomycin IV Pharmacy-to-Dose Consultation    Diogenes Mullins is a 38 y.o. male who was receiving Vancomycin IV with management by the Pharmacy Consult service for treatment of Other liver abcess.       The patient’s Vancomycin therapy has been completed / discontinued. Thank you for allowing us to take part in this patient's care. Pharmacy will sign-off now; please call or re-consult if there are any questions.        Yoana [email or web address].Ph

## 2024-05-26 NOTE — RESPIRATORY THERAPY NOTE
"RT Protocol Note  Diogenes Mullins 38 y.o. male MRN: [id number]  Unit/Bed#: [facility] 925-01 Encounter: [id number]    Assessment    Principal Problem:    Liver abscess      Home Pulmonary Medications:  none       No past medical history on file.  Social History     Socioeconomic History    Marital status: Single     Spouse name: Not on file    Number of children: Not on file    Years of education: Not on file    Highest education level: Not on file   Occupational History    Not on file   Tobacco Use    Smoking status: Former     Types: Cigarettes    Smokeless tobacco: Never   Vaping Use    Vaping status: Former   Substance and Sexual Activity    Alcohol use: Not Currently    Drug use: Never    Sexual activity: Not Currently   Other Topics Concern    Not on file   Social History Narrative    Not on file     Social Determinants of Health     Financial Resource Strain: Not on file   Food Insecurity: Not on file   Transportation Needs: Not on file   Physical Activity: Not on file   Stress: Not on file   Social Connections: Not on file   Intimate Partner Violence: Not on file   Housing Stability: Not on file       Subjective         Objective    Physical Exam:   Assessment Type: During-treatment  General Appearance: Awake  Respiratory Pattern: Normal  Chest Assessment: Chest expansion symmetrical  Bilateral Breath Sounds: Clear, Diminished    Vitals:  Blood pressure 130/77, pulse (!) 114, temperature 100.1 °F (37.8 °C), resp. rate 18, height 5' 10\" (1.778 m), weight 118 kg (260 lb 2.3 oz), SpO2 97%.    Results from last 7 days   Lab Units 05/23/24  0722 05/22/24  1747 05/22/24  1441   PH ART  7.467*   < > 7.273*   PCO2 ART mm Hg 34.1*   < > 34.1*   PO2 ART mm Hg 124.4   < > 104.1   HCO3 ART mmol/L 24.1   < > 15.4*   BASE EXC ART mmol/L 0.8   < > -10.5   O2 CONTENT ART mL/dL 15.9*   < > 13.1*   O2 HGB, ARTERIAL % 97.8*   < > 95.5   ABG SOURCE  Line, Arterial   < > Line, Arterial   ANTONIO TEST   --   --  No    < > = values in this " interval not displayed.       Imaging and other studies: I have personally reviewed pertinent reports.      O2 Device: vent     Plan    Respiratory Plan: No distress/Pulmonary history  Airway Clearance Plan: Incentive Spirometer, Flutter     Resp Comments: pt found on ra, spo2 is 99%, bs are diminished, prn tx given, will cont to monitor per resp protocol.

## 2024-05-26 NOTE — BRIEF OP NOTE (RAD/CATH)
INTERVENTIONAL RADIOLOGY PROCEDURE NOTE    Date: 5/26/2024    Procedure:   Procedure Summary       Date:  Room / Location:     Anesthesia Start:  Anesthesia Stop:     Procedure:  Diagnosis:     Scheduled Providers:  Responsible Provider:     Anesthesia Type: Not recorded ASA Status: Not recorded            Preoperative diagnosis:   1. Liver abscess         Postoperative diagnosis: Same.    Surgeon: Carl Pardo MD     Assistant: None. No qualified resident was available.    Blood loss: Minimal    Specimens: Abscess fluid     Findings:   Complex echotexture of fluid collection within the caudal aspect of the right liver, communicating with a larger mostly anechoic lateral collection.    Successful US-guided drain placement (14 Fr x 25 cm MPD) into the complex component.    Aspiration yielded 270 mL of purulent appearing blood and debris.  Samples sent for culture and sensitivities.    No residual collection at end of procedure.  Drain to SYMONE bulb suction.    Complications: None immediate.    Anesthesia: conscious sedation

## 2024-05-26 NOTE — PLAN OF CARE
Problem: Prexisting or High Potential for Compromised Skin Integrity  Goal: Skin integrity is maintained or improved  Description: INTERVENTIONS:  - Identify patients at risk for skin breakdown  - Assess and monitor skin integrity  - Assess and monitor nutrition and hydration status  - Monitor labs   - Assess for incontinence   - Turn and reposition patient  - Assist with mobility/ambulation  - Relieve pressure over bony prominences  - Avoid friction and shearing  - Provide appropriate hygiene as needed including keeping skin clean and dry  - Evaluate need for skin moisturizer/barrier cream  - Collaborate with interdisciplinary team   - Patient/family teaching  - Consider wound care consult   Outcome: Progressing     Problem: PAIN - ADULT  Goal: Verbalizes/displays adequate comfort level or baseline comfort level  Description: Interventions:  - Encourage patient to monitor pain and request assistance  - Assess pain using appropriate pain scale  - Administer analgesics based on type and severity of pain and evaluate response  - Implement non-pharmacological measures as appropriate and evaluate response  - Consider cultural and social influences on pain and pain management  - Notify physician/advanced practitioner if interventions unsuccessful or patient reports new pain  Outcome: Progressing     Problem: INFECTION - ADULT  Goal: Absence or prevention of progression during hospitalization  Description: INTERVENTIONS:  - Assess and monitor for signs and symptoms of infection  - Monitor lab/diagnostic results  - Monitor all insertion sites, i.e. indwelling lines, tubes, and drains  - Monitor endotracheal if appropriate and nasal secretions for changes in amount and color  - [place] appropriate cooling/warming therapies per order  - Administer medications as ordered  - Instruct and encourage patient and family to use good hand hygiene technique  - Identify and instruct in appropriate isolation precautions for  identified infection/condition  Outcome: Progressing  Goal: Absence of fever/infection during neutropenic period  Description: INTERVENTIONS:  - Monitor WBC    Outcome: Progressing     Problem: SAFETY ADULT  Goal: Patient will remain free of falls  Description: INTERVENTIONS:  - Educate patient/family on patient safety including physical limitations  - Instruct patient to call for assistance with activity   - Consult OT/PT to assist with strengthening/mobility   - Keep Call bell within reach  - Keep bed low and locked with side rails adjusted as appropriate  - Keep care items and personal belongings within reach  - Initiate and maintain comfort rounds  - Make Fall Risk Sign visible to staff  - Offer Toileting every 2 Hours, in advance of need  - Initiate/Maintain bed alarm  - Obtain necessary fall risk management equipment  - Apply yellow socks and bracelet for high fall risk patients  - Consider moving patient to room near nurses station  Outcome: Progressing  Goal: Maintain or return to baseline ADL function  Description: INTERVENTIONS:  -  Assess patient's ability to carry out ADLs; assess patient's baseline for ADL function and identify physical deficits which impact ability to perform ADLs (bathing, care of mouth/teeth, toileting, grooming, dressing, etc.)  - Assess/evaluate cause of self-care deficits   - Assess range of motion  - Assess patient's mobility; develop plan if impaired  - Assess patient's need for assistive devices and provide as appropriate  - Encourage maximum independence but intervene and supervise when necessary  - Involve family in performance of ADLs  - Assess for home care needs following discharge   - Consider OT consult to assist with ADL evaluation and planning for discharge  - Provide patient education as appropriate  Outcome: Progressing  Goal: Maintains/Returns to pre admission functional level  Description: INTERVENTIONS:  - Perform AM-PAC 6 Click Basic Mobility/ Daily Activity  assessment daily.  - Set and communicate daily mobility goal to care team and patient/family/caregiver.   - Collaborate with rehabilitation services on mobility goals if consulted  - Ambulate patient 3 times a day  - Out of bed to chair 3 times a day   - Out of bed for meals 3 times a day  - Out of bed for toileting  - Record patient progress and toleration of activity level   Outcome: Progressing     Problem: DISCHARGE PLANNING  Goal: Discharge to home or other facility with appropriate resources  Description: INTERVENTIONS:  - Identify barriers to discharge w/patient and caregiver  - Arrange for needed discharge resources and transportation as appropriate  - Identify discharge learning needs (meds, wound care, etc.)  - Arrange for interpretive services to assist at discharge as needed  - Refer to Case Management Department for coordinating discharge planning if the patient needs post-hospital services based on physician/advanced practitioner order or complex needs related to functional status, cognitive ability, or social support system  Outcome: Progressing     Problem: Nutrition/Hydration-ADULT  Goal: Nutrient/Hydration intake appropriate for improving, restoring or maintaining nutritional needs  Description: Monitor and assess patient's nutrition/hydration status for malnutrition. Collaborate with interdisciplinary team and initiate plan and interventions as ordered.  Monitor patient's weight and dietary intake as ordered or per policy. Utilize nutrition screening tool and intervene as necessary. Determine patient's food preferences and provide high-protein, high-caloric foods as appropriate.     INTERVENTIONS:  - Monitor oral intake, urinary output, labs, and treatment plans  - Assess nutrition and hydration status and recommend course of action  - Evaluate amount of meals eaten  - Assist patient with eating if necessary   - Allow adequate time for meals  - Recommend/ encourage appropriate diets, oral  nutritional supplements, and vitamin/mineral supplements  - Order, calculate, and assess calorie counts as needed  - Recommend, monitor, and adjust tube feedings and TPN/PPN based on assessed needs  - Assess need for intravenous fluids  - Provide specific nutrition/hydration education as appropriate  - Include patient/family/caregiver in decisions related to nutrition  Outcome: Progressing

## 2024-05-26 NOTE — CONSULTS
e-Consult (IPC)  - Interventional Radiology  Diogenes Mullins 38 y.o. male MRN: [id number]  Unit/Bed#: [facility] 925-01 Encounter: [id number]          Interventional Radiology has been consulted to evaluate Diogenes Mullins    We were consulted by General Surgery concerning this patient with severe sepsis, perforated liver abscess with purulent peritonitis status post exploratory laparotomy, abscess drainage, and washout.  He continued to be febrile with a leukocytosis - repeat imaging demonstrated enlargement of the hepatic abscess.    Inpatient Consult to IR  Consult performed by: Carl Pardo MD  Consult ordered by: Kerri Oglesby MD        05/26/24    Assessment/Recommendation:   Percutaneous drain placement is indicated.  There is a complex hepatic abscess cavity that may be partially septated but appears to generally be in communication. Will plan for placement of a larger bore percutaneous drain (likely 14 Fr) to optimize drainage; further drainage may be required as this process evolves given significant accumulation despite surgical washout.    Anticipate drain placement today, timing dependent on IR availability.    Labs reviewed and appropriate.    5-10 minutes, >50% of the total time devoted to medical consultative verbal/EMR discussion between providers. Written report will be generated in the EMR.     Thank you for allowing Interventional Radiology to participate in the care of Diogeens Mullins. Please don't hesitate to call or TigerText us with any questions.     Carl Pardo MD

## 2024-05-27 LAB
ANION GAP SERPL CALCULATED.3IONS-SCNC: 8 MMOL/L (ref 4–13)
BACTERIA BLD CULT: NORMAL
BUN SERPL-MCNC: 13 MG/DL (ref 5–25)
CALCIUM SERPL-MCNC: 6.7 MG/DL (ref 8.4–10.2)
CHLORIDE SERPL-SCNC: 99 MMOL/L (ref 96–108)
CO2 SERPL-SCNC: 28 MMOL/L (ref 21–32)
CREAT SERPL-MCNC: 0.53 MG/DL (ref 0.6–1.3)
ERYTHROCYTE [DISTWIDTH] IN BLOOD BY AUTOMATED COUNT: 15.3 % (ref 11.6–15.1)
GFR SERPL CREATININE-BSD FRML MDRD: 133 ML/MIN/1.73SQ M
GLUCOSE SERPL-MCNC: 109 MG/DL (ref 65–140)
HCT VFR BLD AUTO: 25.9 % (ref 36.5–49.3)
HGB BLD-MCNC: 8.2 G/DL (ref 12–17)
MAGNESIUM SERPL-MCNC: 2.1 MG/DL (ref 1.9–2.7)
MCH RBC QN AUTO: 29.5 PG (ref 26.8–34.3)
MCHC RBC AUTO-ENTMCNC: 31.7 G/DL (ref 31.4–37.4)
MCV RBC AUTO: 93 FL (ref 82–98)
NRBC BLD AUTO-RTO: 0 /100 WBCS
PHOSPHATE SERPL-MCNC: 2.9 MG/DL (ref 2.7–4.5)
PLATELET # BLD AUTO: 507 THOUSANDS/UL (ref 149–390)
PMV BLD AUTO: 9.7 FL (ref 8.9–12.7)
POTASSIUM SERPL-SCNC: 4.6 MMOL/L (ref 3.5–5.3)
RBC # BLD AUTO: 2.78 MILLION/UL (ref 3.88–5.62)
SODIUM SERPL-SCNC: 135 MMOL/L (ref 135–147)
WBC # BLD AUTO: 21.99 THOUSAND/UL (ref 4.31–10.16)

## 2024-05-27 PROCEDURE — 84100 ASSAY OF PHOSPHORUS: CPT

## 2024-05-27 PROCEDURE — 94760 N-INVAS EAR/PLS OXIMETRY 1: CPT

## 2024-05-27 PROCEDURE — C9113 INJ PANTOPRAZOLE SODIUM, VIA: HCPCS | Performed by: NURSE PRACTITIONER

## 2024-05-27 PROCEDURE — 99233 SBSQ HOSP IP/OBS HIGH 50: CPT | Performed by: INTERNAL MEDICINE

## 2024-05-27 PROCEDURE — 85027 COMPLETE CBC AUTOMATED: CPT

## 2024-05-27 PROCEDURE — 80048 BASIC METABOLIC PNL TOTAL CA: CPT

## 2024-05-27 PROCEDURE — 99024 POSTOP FOLLOW-UP VISIT: CPT | Performed by: SURGERY

## 2024-05-27 PROCEDURE — 94664 DEMO&/EVAL PT USE INHALER: CPT

## 2024-05-27 PROCEDURE — 83735 ASSAY OF MAGNESIUM: CPT

## 2024-05-27 RX ORDER — ALBUTEROL SULFATE 90 UG/1
2 AEROSOL, METERED RESPIRATORY (INHALATION) EVERY 4 HOURS PRN
Status: DISCONTINUED | OUTPATIENT
Start: 2024-05-27 | End: 2024-06-05 | Stop reason: HOSPADM

## 2024-05-27 RX ORDER — CALCIUM GLUCONATE 20 MG/ML
2 INJECTION, SOLUTION INTRAVENOUS ONCE
Status: COMPLETED | OUTPATIENT
Start: 2024-05-27 | End: 2024-05-27

## 2024-05-27 RX ADMIN — HYDROMORPHONE HYDROCHLORIDE 1 MG: 1 INJECTION, SOLUTION INTRAMUSCULAR; INTRAVENOUS; SUBCUTANEOUS at 15:01

## 2024-05-27 RX ADMIN — ONDANSETRON 4 MG: 2 INJECTION INTRAMUSCULAR; INTRAVENOUS at 12:12

## 2024-05-27 RX ADMIN — GABAPENTIN 100 MG: 100 CAPSULE ORAL at 17:30

## 2024-05-27 RX ADMIN — SODIUM CHLORIDE 1000 ML: 0.9 INJECTION, SOLUTION INTRAVENOUS at 07:45

## 2024-05-27 RX ADMIN — ACETAMINOPHEN 975 MG: 325 TABLET, FILM COATED ORAL at 13:33

## 2024-05-27 RX ADMIN — SODIUM CHLORIDE, SODIUM GLUCONATE, SODIUM ACETATE, POTASSIUM CHLORIDE, MAGNESIUM CHLORIDE, SODIUM PHOSPHATE, DIBASIC, AND POTASSIUM PHOSPHATE 100 ML/HR: .53; .5; .37; .037; .03; .012; .00082 INJECTION, SOLUTION INTRAVENOUS at 00:06

## 2024-05-27 RX ADMIN — OXYCODONE HYDROCHLORIDE 10 MG: 10 TABLET ORAL at 12:04

## 2024-05-27 RX ADMIN — SODIUM CHLORIDE 3 G: 9 INJECTION, SOLUTION INTRAVENOUS at 05:30

## 2024-05-27 RX ADMIN — SODIUM CHLORIDE 3 G: 9 INJECTION, SOLUTION INTRAVENOUS at 17:30

## 2024-05-27 RX ADMIN — POTASSIUM & SODIUM PHOSPHATES POWDER PACK 280-160-250 MG 2 PACKET: 280-160-250 PACK at 11:09

## 2024-05-27 RX ADMIN — OXYCODONE HYDROCHLORIDE 10 MG: 10 TABLET ORAL at 07:53

## 2024-05-27 RX ADMIN — PANTOPRAZOLE SODIUM 40 MG: 40 INJECTION, POWDER, FOR SOLUTION INTRAVENOUS at 07:53

## 2024-05-27 RX ADMIN — HEPARIN SODIUM 5000 UNITS: 5000 INJECTION INTRAVENOUS; SUBCUTANEOUS at 22:01

## 2024-05-27 RX ADMIN — SODIUM CHLORIDE, SODIUM GLUCONATE, SODIUM ACETATE, POTASSIUM CHLORIDE, MAGNESIUM CHLORIDE, SODIUM PHOSPHATE, DIBASIC, AND POTASSIUM PHOSPHATE 125 ML/HR: .53; .5; .37; .037; .03; .012; .00082 INJECTION, SOLUTION INTRAVENOUS at 18:28

## 2024-05-27 RX ADMIN — HEPARIN SODIUM 5000 UNITS: 5000 INJECTION INTRAVENOUS; SUBCUTANEOUS at 13:33

## 2024-05-27 RX ADMIN — GABAPENTIN 100 MG: 100 CAPSULE ORAL at 22:01

## 2024-05-27 RX ADMIN — GABAPENTIN 100 MG: 100 CAPSULE ORAL at 07:53

## 2024-05-27 RX ADMIN — ONDANSETRON 4 MG: 2 INJECTION INTRAMUSCULAR; INTRAVENOUS at 22:11

## 2024-05-27 RX ADMIN — HEPARIN SODIUM 5000 UNITS: 5000 INJECTION INTRAVENOUS; SUBCUTANEOUS at 05:31

## 2024-05-27 RX ADMIN — METHOCARBAMOL 500 MG: 500 TABLET ORAL at 11:29

## 2024-05-27 RX ADMIN — SODIUM CHLORIDE, SODIUM GLUCONATE, SODIUM ACETATE, POTASSIUM CHLORIDE, MAGNESIUM CHLORIDE, SODIUM PHOSPHATE, DIBASIC, AND POTASSIUM PHOSPHATE 125 ML/HR: .53; .5; .37; .037; .03; .012; .00082 INJECTION, SOLUTION INTRAVENOUS at 09:46

## 2024-05-27 RX ADMIN — METHOCARBAMOL 500 MG: 500 TABLET ORAL at 05:30

## 2024-05-27 RX ADMIN — OXYCODONE HYDROCHLORIDE 10 MG: 10 TABLET ORAL at 18:27

## 2024-05-27 RX ADMIN — CALCIUM GLUCONATE 2 G: 20 INJECTION, SOLUTION INTRAVENOUS at 13:34

## 2024-05-27 RX ADMIN — OXYCODONE HYDROCHLORIDE 10 MG: 10 TABLET ORAL at 03:11

## 2024-05-27 RX ADMIN — ACETAMINOPHEN 975 MG: 325 TABLET, FILM COATED ORAL at 22:01

## 2024-05-27 RX ADMIN — METHOCARBAMOL 500 MG: 500 TABLET ORAL at 17:30

## 2024-05-27 RX ADMIN — ACETAMINOPHEN 975 MG: 325 TABLET, FILM COATED ORAL at 05:30

## 2024-05-27 RX ADMIN — SODIUM CHLORIDE 3 G: 9 INJECTION, SOLUTION INTRAVENOUS at 11:08

## 2024-05-27 RX ADMIN — ONDANSETRON 4 MG: 2 INJECTION INTRAMUSCULAR; INTRAVENOUS at 07:53

## 2024-05-27 NOTE — RESPIRATORY THERAPY NOTE
Resp care   05/27/24 0730   Respiratory Assessment   Assessment Type Assess only   General Appearance Awake   Respiratory Pattern Normal   Chest Assessment Chest expansion symmetrical   Bilateral Breath Sounds Diminished   Resp Comments pt found on 1L nc, spo2 is 95%, bs are diminished, no prn tx needed at this time, will d/c resp protocol and order prn inhaler.   Oxygen Therapy/Pulse Ox   O2 Device Nasal cannula   Nasal Cannula O2 Flow Rate (L/min) 1 L/min   Calculated FIO2 (%) - Nasal Cannula 24   SpO2 Activity At Rest   $ Pulse Oximetry Spot Check Charge Completed

## 2024-05-27 NOTE — PROGRESS NOTES
"General Surgery  Progress Note   Diogenes Mullins 38 y.o. male MRN: [id number]  Unit/Bed#: [facility] 925-01 Encounter: [id number]    Assessment:  38-year-old male with perforated hepatic abscess status post  diagnostic laparoscopy converted to exploratory laparotomy with drainage of hepatic abscess, four-quadrant peritoneal lavage 2/2 purulent peritonitis and drain placements.   Persistent hepatic abscess s/p  IR drain placement within abscess cavity     Persistent tachycardia. Tmax 100.7 F at 10 pm.  Intermittently requiring 2 L NC, on RA this morning    UOP: 1220 cc  IR abscess drain: 100 cc serosang  LLQ SYMONE 50 cc serous  LUQ SYMONE 50 cc serous  RLQ SYMONE 5 cc serous  RUQ SYMONE 67 cc serous    WBC 21.99 (22.54)  Hgb 8.2 (8.7)  Cr 0.53 (0.55)    Plan:  Advance to clear liquid diet  Isolyte 125  Continue IV Unasyn   ID following, appreciate recommendations  Monitor abdominal/ IR drains - maintain to bulb suction  Daily packing changes to midline  Follow up cultures   IR aspiration: gram positive cocci, pending anaerobic   DVT ppx: Heparin  Will need outpatient colonoscopy  Pain/ nausea control PRN  OOB/ ambulation  Incentive Spirometry      Subjective/Objective     Subjective:   Patient seen and examined at bedside, in no acute distress. No acute events overnight. Pain improving but does report some pain at new drain site. Denies nausea or vomiting. Passing flatus and having BM. Has been OOB and ambulating, using IS frequently.    Objective:   Vitals:Blood pressure 108/78, pulse (!) 118, temperature 99.2 °F (37.3 °C), resp. rate 18, height 5' 10\" (1.778 m), weight 118 kg (260 lb 2.3 oz), SpO2 95%.  Temp (24hrs), Av.3 °F (37.4 °C), Min:98.5 °F (36.9 °C), Max:100.7 °F (38.2 °C)        Intake/Output Summary (Last 24 hours) at 2024 0586  Last data filed at 2024 0542  Gross per 24 hour   Intake --   Output 1457 ml   Net -1457 ml       Invasive Devices       Peripheral Intravenous Line  Duration             " Peripheral IV 05/24/24 Left;Ventral (anterior) Forearm 2 days    Peripheral IV 05/27/24 Right Antecubital <1 day              Drain  Duration             Closed/Suction Drain LLQ Bulb 19 Fr. 4 days    Closed/Suction Drain LUQ Bulb 19 Fr. 4 days    Closed/Suction Drain Right RLQ Bulb 19 Fr. 4 days    Closed/Suction Drain Right RUQ Bulb 19 Fr. 4 days    Abscess Drain Back <1 day                    Physical Exam:  General: No acute distress, alert and oriented  CV: Well perfused, regular rate and rhythm  Lungs: Normal work of breathing, no increased respiratory effort. + productive cough  Abdomen: Soft, appropriately tender, midly distended. Incision clean, dry and intact. Abdominal SYMONE x4 with serous output in bulb. RUQ IR drain SS  Extremities: No edema, clubbing or cyanosis  Skin: Warm, dry    Lab Results: BMP/CMP:   Lab Results   Component Value Date    SODIUM 135 05/27/2024    K 4.6 05/27/2024    CL 99 05/27/2024    CO2 28 05/27/2024    BUN 13 05/27/2024    CREATININE 0.53 (L) 05/27/2024    CALCIUM 6.7 (L) 05/27/2024    AST 64 (H) 05/26/2024    ALT 67 (H) 05/26/2024    ALKPHOS 151 (H) 05/26/2024    EGFR 133 05/27/2024    and CBC:   Lab Results   Component Value Date    WBC 21.99 (H) 05/27/2024    HGB 8.2 (L) 05/27/2024    HCT 25.9 (L) 05/27/2024    MCV 93 05/27/2024     (H) 05/27/2024    RBC 2.78 (L) 05/27/2024    MCH 29.5 05/27/2024    MCHC 31.7 05/27/2024    RDW 15.3 (H) 05/27/2024    MPV 9.7 05/27/2024    NRBC 0 05/27/2024     VTE Prophylaxis: Sequential compression device (Venodyne)  and Heparin    Kerri Oglesby MD  5/27/2024

## 2024-05-27 NOTE — PROGRESS NOTES
Progress Note - Infectious Disease   Diogenes Mullins 38 y.o. male MRN: [id number]  [facility] Encounter: [id number]      Impression/Recommendations:  1.  Severe sepsis.  Tachycardia, tachypnea, leukocytosis, elevated lactic acid.  Secondary to #2.  Blood cultures are negative.  Ongoing fevers likely due to lack of source control, now status post IR drain.     -Antibiotic plan as below  -Follow temperatures and hemodynamics  -Check CBC in AM to assess treatment response  -Supportive care per critical care service     2.  Perforated liver abscess with purulent peritonitis.  Unclear etiology, risk factors.  Consider biliary anatomic abnormality versus intraluminal GI lesion.  Abdominal CT with no other acute abnormalities including evidence of malignancy. RUQ US with no biliary abnormalities.  No FH of GI malignancy.  Status post exploratory laparotomy with drainage of abscess and four-quadrant peritoneal lavage 5/22.  Intraoperative findings included extensive four-quadrant purulent peritonitis with near frozen abdomen due to inflammation.  OR cultures with Group F Strep (unusual biliary pathogen), prevotella.  Pathology negative for malignancy.  Repeat CT showed persistent abscess.  Status post IR drain 5/26 yielding 270 cc pus.  Fluid cultures pending..     -Continue Unasyn for now  -Follow up repeat fluid cultures  -Follow drain output  -Surgery follow-up ongoing  -Serial abdominal exams  -Consider EGD/colonoscopy to evaluate for intraluminal lesion as portal of entry for infection     3.  Acute respiratory insufficiency.  Now extubated.  Continues on O2 likely due to volume overload.       -Follow O2 requirements.  -Consider diuresis     4.  Acute kidney injury.  In the setting of severe sepsis.  No acute  findings on CT.  Creatinine has improved.     5.  Elevated LFTs.  In the setting of #1/#2.  No radiologic evidence of biliary obstruction.  LFTs continue to improve.     Dr. Cerrato will assume care  .    Antibiotics:  Unasyn  Antibiotics 6  POD5    Subjective/24 Hour Events:  Patient seen on AM rounds.  Doing OK.  Didn't sleep well due to losing IV access, IV pump beeping.  Prior to onset of illness 2 weeks ago was healthy.  No recent weight loss, blood in stool, or change in bowel habits.  Denies FH of colon cancer.  Had persistent fevers, WBC over weekend and repeat CT showed persistent collection.  IR placed drain yielding 270 cc purulent fluid.    Objective:  Vitals:  Temp:  [98.8 °F (37.1 °C)-100.7 °F (38.2 °C)] 99.2 °F (37.3 °C)  HR:  [105-130] 108  Resp:  [16-18] 16  BP: ()/(50-78) 114/64  SpO2:  [94 %-99 %] 96 %  Temp (24hrs), Av.5 °F (37.5 °C), Min:98.8 °F (37.1 °C), Max:100.7 °F (38.2 °C)  Current: Temperature: 99.2 °F (37.3 °C)    Physical Exam:   General:  No acute distress  Psychiatric:  Awake and alert  Pulmonary:  Normal respiratory excursion without accessory muscle use  Abdomen:  Soft, nontender  Extremities:  No edema  Skin:  No rashes    Lab Results:  I have personally reviewed pertinent labs.  Results from last 7 days   Lab Units 24  0411 24  1029 24  0529 24  0910 24  1440 24  1339 24  1254 24  1208   SODIUM mmol/L 135  --  133* 133*   < >  --   --   --    POTASSIUM mmol/L 4.6  --  4.2 4.0   < >  --   --   --    CHLORIDE mmol/L 99  --  98 100   < >  --   --   --    CO2 mmol/L   --     < >  --   --   --    CO2, I-STAT mmol/L  --   --   --   --   --  19* 20* 23   BUN mg/dL 13  --  16 21   < >  --   --   --    CREATININE mg/dL 0.53*  --  0.55* 0.60   < >  --   --   --    EGFR ml/min/1.73sq m 133  --  131 126   < >  --   --   --    GLUCOSE, ISTAT mg/dl  --   --   --   --   --  125 134 130   CALCIUM mg/dL 6.7*  --  6.7* 7.1*   < >  --   --   --    AST U/L  --  64* 73* 64*   < >  --   --   --    ALT U/L  --  67* 69* 68*   < >  --   --   --    ALK PHOS U/L  --  151* 128* 121*   < >  --   --   --     < > = values in this  interval not displayed.     Results from last 7 days   Lab Units 05/27/24  0411 05/26/24  0529 05/25/24  0910   WBC Thousand/uL 21.99* 22.54* 21.51*   HEMOGLOBIN g/dL 8.2* 8.7* 9.6*   PLATELETS Thousands/uL 507* 388 308     Results from last 7 days   Lab Units 05/26/24  1536 05/22/24  1203 05/22/24  0835 05/22/24  0815 05/22/24  0415 05/22/24  0210 05/22/24  0150   BLOOD CULTURE   --   --  No Growth After 4 Days. No Growth After 4 Days.  --  No Growth After 5 Days. No Growth After 5 Days.   GRAM STAIN RESULT  2+ Polys*  1+ Gram positive cocci in chains and clusters* 4+ Polys*  4+ Gram negative rods*  1+ Gram positive cocci in pairs*  --   --   --   --   --    URINE CULTURE   --   --   --   --  No Growth <1000 cfu/mL  --   --    BODY FLUID CULTURE, STERILE   --  4+ Growth of Beta Hemolytic Streptococcus Group F*  --   --   --   --   --        Imaging Studies:   I have personally reviewed pertinent imaging study reports and images in PACS.  CT A/P images reviewed persistent liver abscess    EKG, Pathology, and Other Studies:   I have personally reviewed pertinent reports.

## 2024-05-28 LAB
ALBUMIN SERPL BCP-MCNC: 2.1 G/DL (ref 3.5–5)
ALP SERPL-CCNC: 117 U/L (ref 34–104)
ALT SERPL W P-5'-P-CCNC: 45 U/L (ref 7–52)
ANION GAP SERPL CALCULATED.3IONS-SCNC: 8 MMOL/L (ref 4–13)
AST SERPL W P-5'-P-CCNC: 37 U/L (ref 13–39)
BILIRUB DIRECT SERPL-MCNC: 0.4 MG/DL (ref 0–0.2)
BILIRUB SERPL-MCNC: 0.95 MG/DL (ref 0.2–1)
BUN SERPL-MCNC: 10 MG/DL (ref 5–25)
CALCIUM SERPL-MCNC: 6.9 MG/DL (ref 8.4–10.2)
CHLORIDE SERPL-SCNC: 99 MMOL/L (ref 96–108)
CO2 SERPL-SCNC: 27 MMOL/L (ref 21–32)
CREAT SERPL-MCNC: 0.48 MG/DL (ref 0.6–1.3)
ERYTHROCYTE [DISTWIDTH] IN BLOOD BY AUTOMATED COUNT: 15.4 % (ref 11.6–15.1)
FUNGUS SPEC CULT: NORMAL
GFR SERPL CREATININE-BSD FRML MDRD: 138 ML/MIN/1.73SQ M
GLUCOSE SERPL-MCNC: 113 MG/DL (ref 65–140)
HCT VFR BLD AUTO: 24.4 % (ref 36.5–49.3)
HGB BLD-MCNC: 7.7 G/DL (ref 12–17)
MCH RBC QN AUTO: 29.6 PG (ref 26.8–34.3)
MCHC RBC AUTO-ENTMCNC: 31.6 G/DL (ref 31.4–37.4)
MCV RBC AUTO: 94 FL (ref 82–98)
PLATELET # BLD AUTO: 632 THOUSANDS/UL (ref 149–390)
PMV BLD AUTO: 9.2 FL (ref 8.9–12.7)
POTASSIUM SERPL-SCNC: 4.1 MMOL/L (ref 3.5–5.3)
PROT SERPL-MCNC: 5 G/DL (ref 6.4–8.4)
RBC # BLD AUTO: 2.6 MILLION/UL (ref 3.88–5.62)
SODIUM SERPL-SCNC: 134 MMOL/L (ref 135–147)
WBC # BLD AUTO: 22.58 THOUSAND/UL (ref 4.31–10.16)

## 2024-05-28 PROCEDURE — 85027 COMPLETE CBC AUTOMATED: CPT | Performed by: OBSTETRICS & GYNECOLOGY

## 2024-05-28 PROCEDURE — 99024 POSTOP FOLLOW-UP VISIT: CPT | Performed by: SURGERY

## 2024-05-28 PROCEDURE — 80048 BASIC METABOLIC PNL TOTAL CA: CPT | Performed by: OBSTETRICS & GYNECOLOGY

## 2024-05-28 PROCEDURE — C9113 INJ PANTOPRAZOLE SODIUM, VIA: HCPCS | Performed by: NURSE PRACTITIONER

## 2024-05-28 PROCEDURE — 80076 HEPATIC FUNCTION PANEL: CPT | Performed by: OBSTETRICS & GYNECOLOGY

## 2024-05-28 PROCEDURE — 99233 SBSQ HOSP IP/OBS HIGH 50: CPT | Performed by: INTERNAL MEDICINE

## 2024-05-28 RX ADMIN — OXYCODONE HYDROCHLORIDE 10 MG: 10 TABLET ORAL at 05:21

## 2024-05-28 RX ADMIN — SCOPOLAMINE 1 PATCH: 1.5 PATCH, EXTENDED RELEASE TRANSDERMAL at 05:20

## 2024-05-28 RX ADMIN — SODIUM CHLORIDE 3 G: 9 INJECTION, SOLUTION INTRAVENOUS at 06:40

## 2024-05-28 RX ADMIN — SODIUM CHLORIDE 3 G: 9 INJECTION, SOLUTION INTRAVENOUS at 00:35

## 2024-05-28 RX ADMIN — METHOCARBAMOL 500 MG: 500 TABLET ORAL at 23:28

## 2024-05-28 RX ADMIN — HYDROMORPHONE HYDROCHLORIDE 1 MG: 1 INJECTION, SOLUTION INTRAMUSCULAR; INTRAVENOUS; SUBCUTANEOUS at 13:57

## 2024-05-28 RX ADMIN — GABAPENTIN 100 MG: 100 CAPSULE ORAL at 22:00

## 2024-05-28 RX ADMIN — GABAPENTIN 100 MG: 100 CAPSULE ORAL at 17:54

## 2024-05-28 RX ADMIN — SODIUM CHLORIDE 3 G: 9 INJECTION, SOLUTION INTRAVENOUS at 23:28

## 2024-05-28 RX ADMIN — ACETAMINOPHEN 975 MG: 325 TABLET, FILM COATED ORAL at 22:00

## 2024-05-28 RX ADMIN — HEPARIN SODIUM 5000 UNITS: 5000 INJECTION INTRAVENOUS; SUBCUTANEOUS at 05:21

## 2024-05-28 RX ADMIN — METHOCARBAMOL 500 MG: 500 TABLET ORAL at 17:54

## 2024-05-28 RX ADMIN — HEPARIN SODIUM 5000 UNITS: 5000 INJECTION INTRAVENOUS; SUBCUTANEOUS at 14:01

## 2024-05-28 RX ADMIN — SODIUM CHLORIDE 3 G: 9 INJECTION, SOLUTION INTRAVENOUS at 17:42

## 2024-05-28 RX ADMIN — SODIUM CHLORIDE 3 G: 9 INJECTION, SOLUTION INTRAVENOUS at 12:11

## 2024-05-28 RX ADMIN — ACETAMINOPHEN 975 MG: 325 TABLET, FILM COATED ORAL at 05:21

## 2024-05-28 RX ADMIN — HEPARIN SODIUM 5000 UNITS: 5000 INJECTION INTRAVENOUS; SUBCUTANEOUS at 22:03

## 2024-05-28 RX ADMIN — OXYCODONE HYDROCHLORIDE 10 MG: 10 TABLET ORAL at 09:39

## 2024-05-28 RX ADMIN — ONDANSETRON 4 MG: 2 INJECTION INTRAMUSCULAR; INTRAVENOUS at 17:57

## 2024-05-28 RX ADMIN — OXYCODONE HYDROCHLORIDE 10 MG: 10 TABLET ORAL at 00:35

## 2024-05-28 RX ADMIN — METHOCARBAMOL 500 MG: 500 TABLET ORAL at 12:11

## 2024-05-28 RX ADMIN — METHOCARBAMOL 500 MG: 500 TABLET ORAL at 00:35

## 2024-05-28 RX ADMIN — OXYCODONE HYDROCHLORIDE 10 MG: 10 TABLET ORAL at 22:02

## 2024-05-28 RX ADMIN — ACETAMINOPHEN 975 MG: 325 TABLET, FILM COATED ORAL at 14:00

## 2024-05-28 RX ADMIN — METHOCARBAMOL 500 MG: 500 TABLET ORAL at 05:20

## 2024-05-28 RX ADMIN — PANTOPRAZOLE SODIUM 40 MG: 40 INJECTION, POWDER, FOR SOLUTION INTRAVENOUS at 09:39

## 2024-05-28 RX ADMIN — GABAPENTIN 100 MG: 100 CAPSULE ORAL at 09:39

## 2024-05-28 RX ADMIN — HYDROMORPHONE HYDROCHLORIDE 1 MG: 1 INJECTION, SOLUTION INTRAMUSCULAR; INTRAVENOUS; SUBCUTANEOUS at 20:15

## 2024-05-28 RX ADMIN — ONDANSETRON 4 MG: 2 INJECTION INTRAMUSCULAR; INTRAVENOUS at 12:10

## 2024-05-28 RX ADMIN — OXYCODONE HYDROCHLORIDE 10 MG: 10 TABLET ORAL at 17:53

## 2024-05-28 NOTE — PROGRESS NOTES
"Progress Note - General Surgery  Diogenes Mullins 38 y.o. male MRN: [id number]  [facility] Encounter: [id number]    Assessment:  38 y.o. male with perforated hepatic abscess status post 5/22 diagnostic laparoscopy converted to exploratory laparotomy with drainage of hepatic abscess, four-quadrant peritoneal lavage 2/2 purulent peritonitis and drain placements. Persistent hepatic abscess s/p 5/26 IR drain placement within abscess cavity.     Patient is afebrile.  Leukocytosis still remains elevated at 22 from 22.    Plan:  - Diet Regular; Regular House  - Pain and Nausea control PRN  - Incentive spirometry  - OOB, ambulate  - As needed nebulized Xopenex treatments  - Wean supplemental O2 as able  - Continue Unasyn per ID recommendations  - Will need outpatient colonoscopy  - Daily packing change  - Monitor SYMONE output  - Please Tiger text the Red surgery resident role with any concerns    Subjective/Objective     Subjective: Still has abdominal pain. Nausea has resolved.  SOB improved.  Ambulating.  Having flatus and bowel movements.      Objective:   Vitals: Blood pressure 119/69, pulse (!) 115, temperature 99.3 °F (37.4 °C), resp. rate 16, height 5' 10\" (1.778 m), weight 129 kg (283 lb 8.2 oz), SpO2 92%.,Body mass index is 40.68 kg/m².    I/O         05/24 0701  05/25 0700 05/25 0701  05/26 0700 05/26 0701  05/27 0700    P.O. 480 1200     I.V. (mL/kg) 1098.3 (9.3) 2011 (17)     IV Piggyback 500 150     Total Intake(mL/kg) 2078.3 (17.6) 3361 (28.5)     Urine (mL/kg/hr) 2125 (0.8) 1100 (0.4)     Drains 265 95     Total Output 2390 1195     Net -311.7 +2166                    Physical Exam:  Gen: NAD, Aox3, Comfortable in bed  Chest: Normal work of breathing, no respiratory distress  Abd: Soft, distended.  Tender mostly in the RUQ.  Surgical sites are packed with plain gauze.  RLQ drain 20 cc, RUQ drain 0 cc, LLQ drain 20 cc, L UQ drain 40 cc.  Right drains are serous, left drain serosanguineous. IR  drain 25 " "bile tinged  Ext: No Edema  Skin: Warm, Dry, Intact      Lab, Imaging and other studies: I have personally reviewed pertinent reports.  , CBC with diff:   Lab Results   Component Value Date    WBC 22.58 (H) 05/28/2024    HGB 7.7 (L) 05/28/2024    HCT 24.4 (L) 05/28/2024    MCV 94 05/28/2024     (H) 05/28/2024    RBC 2.60 (L) 05/28/2024    MCH 29.6 05/28/2024    MCHC 31.6 05/28/2024    RDW 15.4 (H) 05/28/2024    MPV 9.2 05/28/2024   , BMP/CMP:   Lab Results   Component Value Date    SODIUM 134 (L) 05/28/2024    K 4.1 05/28/2024    CL 99 05/28/2024    CO2 27 05/28/2024    BUN 10 05/28/2024    CREATININE 0.48 (L) 05/28/2024    CALCIUM 6.9 (L) 05/28/2024    AST 37 05/28/2024    ALT 45 05/28/2024    ALKPHOS 117 (H) 05/28/2024    EGFR 138 05/28/2024   , Magnesium: No components found for: \"MAG\"  VTE Pharmacologic Prophylaxis: Heparin  VTE Mechanical Prophylaxis: sequential compression device        Maria E Rojo MD  5/28/2024  6:20 AM    "

## 2024-05-28 NOTE — RESTORATIVE TECHNICIAN NOTE
Restorative Technician Note      Patient Name: Diogenes Mullins     Restorative Tech Visit Date: 05/28/24  Note Type: Mobility  Patient Position Upon Consult: Supine  Activity Performed: Ambulated  Assistive Device: Other (Comment) (None)  Patient Position at End of Consult: Bedside chair; All needs within reach  Nurse Communication: Nurse aware of consult, application of brace    Dwight Corrales, Restorative Technician

## 2024-05-28 NOTE — PROGRESS NOTES
Progress Note - Infectious Disease   Diogenes Mullins 38 y.o. male MRN: [id number]  [facility]: [id number]      Impression/Recommendations:  1.  Severe sepsis.  Tachycardia, tachypnea, leukocytosis, elevated lactic acid.  Secondary to #2.  Blood cultures are negative.  Ongoing fevers likely due to lack of source control, now status post IR drain.     -Antibiotic plan as below  -Follow temperatures and hemodynamics  -Check CBC in AM to assess treatment response     2.  Perforated liver abscess with purulent peritonitis.  Unclear etiology, risk factors. Consider biliary anatomic abnormality versus intraluminal GI lesion.  Abdominal CT with no other acute abnormalities including evidence of malignancy. RUQ US with no biliary abnormalities.  No FH of GI malignancy.  Status post exploratory laparotomy with drainage of abscess and four-quadrant peritoneal lavage 5/22. Intraoperative findings included extensive four-quadrant purulent peritonitis with near frozen abdomen due to inflammation.  OR cultures with Group F Strep (unusual biliary pathogen), prevotella.  Pathology negative for malignancy.  Repeat CT showed persistent abscess.  Status post IR drain 5/26 yielding 270 cc pus.  Culture again with growth of group F strep thus far.     -Continue Unasyn for now  -Follow up repeat fluid cultures until finalized  -Follow drain output  -Surgery follow-up ongoing  -Serial abdominal exams  -Consider EGD/colonoscopy to evaluate for intraluminal lesion as portal of entry for infection     3.  Acute respiratory insufficiency.  Now extubated, down to room air.     -Follow O2 requirements.  -Volume management per surgery service    4.  Acute kidney injury.  In the setting of severe sepsis.  No acute  findings on CT.  Creatinine has improved.     5.  Elevated LFTs.  In the setting of #1/#2.  No radiologic evidence of biliary obstruction.  LFTs continue to improve.        Antibiotics:  Unasyn  Antibiotics  7  POD6    I discussed the above plan with patient and mother at bedside and with primary service.        Subjective:  Patient seen on AM rounds.  Overall feeling better today.  Some discomfort at new drain site.  Tmax 100.7.  Tolerating oral intake.    Objective:  Vitals:  Temp:  [98.8 °F (37.1 °C)-99.3 °F (37.4 °C)] 99.1 °F (37.3 °C)  HR:  [109-124] 109  Resp:  [16] 16  BP: (119)/(69-72) 119/72  SpO2:  [91 %-93 %] 93 %  Temp (24hrs), Av.1 °F (37.3 °C), Min:98.8 °F (37.1 °C), Max:99.3 °F (37.4 °C)  Current: Temperature: 99.1 °F (37.3 °C)    Physical Exam:   General:  No acute distress, nontoxic, sitting comfortably in chair  HEENT atraumatic normocephalic  Neck trachea midline  Psychiatric:  Awake and alert  Pulmonary:  Normal respiratory excursion without accessory muscle use  Abdomen: No rigidity or guarding, drains x 5 in place  Extremities:  No edema  Skin:  No rashes  Neuro moves all extremities spontaneously    Lab Results:  I have personally reviewed pertinent labs.  Results from last 7 days   Lab Units 24  0358 24  0411 24  1029 24  0529 24  1440 24  1339 24  1254 24  1208   POTASSIUM mmol/L 4.1 4.6  --  4.2   < >  --   --   --    CHLORIDE mmol/L 99 99  --  98   < >  --   --   --    CO2 mmol/L   --     < >  --   --   --    CO2, I-STAT mmol/L  --   --   --   --   --  19* 20* 23   BUN mg/dL 10 13  --  16   < >  --   --   --    CREATININE mg/dL 0.48* 0.53*  --  0.55*   < >  --   --   --    EGFR ml/min/1.73sq m 138 133  --  131   < >  --   --   --    GLUCOSE, ISTAT mg/dl  --   --   --   --   --  125 134 130   CALCIUM mg/dL 6.9* 6.7*  --  6.7*   < >  --   --   --    AST U/L 37  --  64* 73*   < >  --   --   --    ALT U/L 45  --  67* 69*   < >  --   --   --    ALK PHOS U/L 117*  --  151* 128*   < >  --   --   --     < > = values in this interval not displayed.     Results from last 7 days   Lab Units 24  0339 24  0411 24  0529   WBC  Thousand/uL 22.58* 21.99* 22.54*   HEMOGLOBIN g/dL 7.7* 8.2* 8.7*   PLATELETS Thousands/uL 632* 507* 388     Results from last 7 days   Lab Units 05/26/24  1536 05/22/24  1203 05/22/24  0835 05/22/24  0815 05/22/24  0415 05/22/24  0210 05/22/24  0150   BLOOD CULTURE   --   --  No Growth After 5 Days. No Growth After 5 Days.  --  No Growth After 5 Days. No Growth After 5 Days.   GRAM STAIN RESULT  2+ Polys*  1+ Gram positive cocci in chains and clusters* 4+ Polys*  4+ Gram negative rods*  1+ Gram positive cocci in pairs*  --   --   --   --   --    URINE CULTURE   --   --   --   --  No Growth <1000 cfu/mL  --   --    BODY FLUID CULTURE, STERILE  2+ Growth of Beta Hemolytic Streptococcus Group F* 4+ Growth of Beta Hemolytic Streptococcus Group F*  --   --   --   --   --        Imaging Studies:   I have personally reviewed pertinent imaging study reports and images in PACS.    EKG, Pathology, and Other Studies:   I have personally reviewed pertinent reports.

## 2024-05-29 LAB
2HR DELTA HS TROPONIN: 72 NG/L
4HR DELTA HS TROPONIN: 1 NG/L
ANION GAP SERPL CALCULATED.3IONS-SCNC: 6 MMOL/L (ref 4–13)
ANISOCYTOSIS BLD QL SMEAR: PRESENT
ATRIAL RATE: 112 BPM
BASOPHILS # BLD MANUAL: 0 THOUSAND/UL (ref 0–0.1)
BASOPHILS NFR MAR MANUAL: 0 % (ref 0–1)
BUN SERPL-MCNC: 8 MG/DL (ref 5–25)
CALCIUM SERPL-MCNC: 7.2 MG/DL (ref 8.4–10.2)
CARDIAC TROPONIN I PNL SERPL HS: 8 NG/L
CARDIAC TROPONIN I PNL SERPL HS: 80 NG/L
CARDIAC TROPONIN I PNL SERPL HS: 9 NG/L
CHLORIDE SERPL-SCNC: 99 MMOL/L (ref 96–108)
CO2 SERPL-SCNC: 27 MMOL/L (ref 21–32)
CREAT SERPL-MCNC: 0.55 MG/DL (ref 0.6–1.3)
DACRYOCYTES BLD QL SMEAR: PRESENT
EOSINOPHIL # BLD MANUAL: 0 THOUSAND/UL (ref 0–0.4)
EOSINOPHIL NFR BLD MANUAL: 0 % (ref 0–6)
ERYTHROCYTE [DISTWIDTH] IN BLOOD BY AUTOMATED COUNT: 15.9 % (ref 11.6–15.1)
GFR SERPL CREATININE-BSD FRML MDRD: 131 ML/MIN/1.73SQ M
GLUCOSE SERPL-MCNC: 109 MG/DL (ref 65–140)
HCT VFR BLD AUTO: 24.5 % (ref 36.5–49.3)
HGB BLD-MCNC: 7.8 G/DL (ref 12–17)
LYMPHOCYTES # BLD AUTO: 0.64 THOUSAND/UL (ref 0.6–4.47)
LYMPHOCYTES # BLD AUTO: 1 % (ref 14–44)
MACROCYTES BLD QL AUTO: PRESENT
MCH RBC QN AUTO: 29.8 PG (ref 26.8–34.3)
MCHC RBC AUTO-ENTMCNC: 31.8 G/DL (ref 31.4–37.4)
MCV RBC AUTO: 94 FL (ref 82–98)
MONOCYTES # BLD AUTO: 0.86 THOUSAND/UL (ref 0–1.22)
MONOCYTES NFR BLD: 4 % (ref 4–12)
NEUTROPHILS # BLD MANUAL: 19.99 THOUSAND/UL (ref 1.85–7.62)
NEUTS BAND NFR BLD MANUAL: 2 % (ref 0–8)
NEUTS SEG NFR BLD AUTO: 91 % (ref 43–75)
P AXIS: 55 DEGREES
PLATELET # BLD AUTO: 821 THOUSANDS/UL (ref 149–390)
PLATELET BLD QL SMEAR: ABNORMAL
PMV BLD AUTO: 9 FL (ref 8.9–12.7)
POIKILOCYTOSIS BLD QL SMEAR: PRESENT
POLYCHROMASIA BLD QL SMEAR: PRESENT
POTASSIUM SERPL-SCNC: 4.7 MMOL/L (ref 3.5–5.3)
PR INTERVAL: 126 MS
QRS AXIS: 1 DEGREES
QRSD INTERVAL: 86 MS
QT INTERVAL: 340 MS
QTC INTERVAL: 464 MS
RBC # BLD AUTO: 2.62 MILLION/UL (ref 3.88–5.62)
RBC MORPH BLD: PRESENT
SODIUM SERPL-SCNC: 132 MMOL/L (ref 135–147)
STOMATOCYTES BLD QL SMEAR: PRESENT
T WAVE AXIS: 45 DEGREES
VARIANT LYMPHS # BLD AUTO: 2 %
VENTRICULAR RATE: 112 BPM
WBC # BLD AUTO: 21.49 THOUSAND/UL (ref 4.31–10.16)

## 2024-05-29 PROCEDURE — 99232 SBSQ HOSP IP/OBS MODERATE 35: CPT | Performed by: INTERNAL MEDICINE

## 2024-05-29 PROCEDURE — 99254 IP/OBS CNSLTJ NEW/EST MOD 60: CPT | Performed by: INTERNAL MEDICINE

## 2024-05-29 PROCEDURE — 85007 BL SMEAR W/DIFF WBC COUNT: CPT | Performed by: NURSE PRACTITIONER

## 2024-05-29 PROCEDURE — C9113 INJ PANTOPRAZOLE SODIUM, VIA: HCPCS | Performed by: NURSE PRACTITIONER

## 2024-05-29 PROCEDURE — 99024 POSTOP FOLLOW-UP VISIT: CPT | Performed by: SURGERY

## 2024-05-29 PROCEDURE — 80048 BASIC METABOLIC PNL TOTAL CA: CPT | Performed by: NURSE PRACTITIONER

## 2024-05-29 PROCEDURE — 93010 ELECTROCARDIOGRAM REPORT: CPT | Performed by: INTERNAL MEDICINE

## 2024-05-29 PROCEDURE — 84484 ASSAY OF TROPONIN QUANT: CPT

## 2024-05-29 PROCEDURE — 85027 COMPLETE CBC AUTOMATED: CPT | Performed by: NURSE PRACTITIONER

## 2024-05-29 PROCEDURE — 93005 ELECTROCARDIOGRAM TRACING: CPT

## 2024-05-29 RX ORDER — METOCLOPRAMIDE HYDROCHLORIDE 5 MG/ML
10 INJECTION INTRAMUSCULAR; INTRAVENOUS ONCE AS NEEDED
Status: DISCONTINUED | OUTPATIENT
Start: 2024-05-29 | End: 2024-05-30

## 2024-05-29 RX ORDER — LABETALOL HYDROCHLORIDE 5 MG/ML
10 INJECTION, SOLUTION INTRAVENOUS
Status: DISCONTINUED | OUTPATIENT
Start: 2024-05-29 | End: 2024-06-05 | Stop reason: HOSPADM

## 2024-05-29 RX ORDER — HYDRALAZINE HYDROCHLORIDE 20 MG/ML
5 INJECTION INTRAMUSCULAR; INTRAVENOUS
Status: DISCONTINUED | OUTPATIENT
Start: 2024-05-29 | End: 2024-06-05 | Stop reason: HOSPADM

## 2024-05-29 RX ORDER — FENTANYL CITRATE/PF 50 MCG/ML
25 SYRINGE (ML) INJECTION
Status: DISCONTINUED | OUTPATIENT
Start: 2024-05-29 | End: 2024-06-04

## 2024-05-29 RX ORDER — ALBUTEROL SULFATE 2.5 MG/3ML
2.5 SOLUTION RESPIRATORY (INHALATION) ONCE AS NEEDED
Status: DISCONTINUED | OUTPATIENT
Start: 2024-05-29 | End: 2024-05-30

## 2024-05-29 RX ORDER — PROMETHAZINE HYDROCHLORIDE 25 MG/ML
12.5 INJECTION, SOLUTION INTRAMUSCULAR; INTRAVENOUS ONCE AS NEEDED
Status: DISCONTINUED | OUTPATIENT
Start: 2024-05-29 | End: 2024-05-30

## 2024-05-29 RX ORDER — HYDROMORPHONE HCL IN WATER/PF 6 MG/30 ML
0.2 PATIENT CONTROLLED ANALGESIA SYRINGE INTRAVENOUS
Status: DISCONTINUED | OUTPATIENT
Start: 2024-05-29 | End: 2024-06-04

## 2024-05-29 RX ORDER — ONDANSETRON 2 MG/ML
4 INJECTION INTRAMUSCULAR; INTRAVENOUS ONCE AS NEEDED
Status: DISCONTINUED | OUTPATIENT
Start: 2024-05-29 | End: 2024-05-30

## 2024-05-29 RX ORDER — HYDROMORPHONE HCL/PF 1 MG/ML
0.5 SYRINGE (ML) INJECTION
Status: DISCONTINUED | OUTPATIENT
Start: 2024-05-29 | End: 2024-06-04

## 2024-05-29 RX ADMIN — ONDANSETRON 4 MG: 2 INJECTION INTRAMUSCULAR; INTRAVENOUS at 20:47

## 2024-05-29 RX ADMIN — ACETAMINOPHEN 975 MG: 325 TABLET, FILM COATED ORAL at 12:08

## 2024-05-29 RX ADMIN — HYDROMORPHONE HYDROCHLORIDE 1 MG: 1 INJECTION, SOLUTION INTRAMUSCULAR; INTRAVENOUS; SUBCUTANEOUS at 16:40

## 2024-05-29 RX ADMIN — OXYCODONE HYDROCHLORIDE 10 MG: 10 TABLET ORAL at 09:07

## 2024-05-29 RX ADMIN — HEPARIN SODIUM 5000 UNITS: 5000 INJECTION INTRAVENOUS; SUBCUTANEOUS at 21:03

## 2024-05-29 RX ADMIN — SODIUM CHLORIDE 3 G: 9 INJECTION, SOLUTION INTRAVENOUS at 10:37

## 2024-05-29 RX ADMIN — HEPARIN SODIUM 5000 UNITS: 5000 INJECTION INTRAVENOUS; SUBCUTANEOUS at 12:08

## 2024-05-29 RX ADMIN — SODIUM CHLORIDE 3 G: 9 INJECTION, SOLUTION INTRAVENOUS at 16:40

## 2024-05-29 RX ADMIN — OXYCODONE HYDROCHLORIDE 10 MG: 10 TABLET ORAL at 15:39

## 2024-05-29 RX ADMIN — HYDROMORPHONE HYDROCHLORIDE 1 MG: 1 INJECTION, SOLUTION INTRAMUSCULAR; INTRAVENOUS; SUBCUTANEOUS at 04:46

## 2024-05-29 RX ADMIN — OXYCODONE HYDROCHLORIDE 10 MG: 10 TABLET ORAL at 19:32

## 2024-05-29 RX ADMIN — HYDROMORPHONE HYDROCHLORIDE 1 MG: 1 INJECTION, SOLUTION INTRAMUSCULAR; INTRAVENOUS; SUBCUTANEOUS at 20:42

## 2024-05-29 RX ADMIN — HEPARIN SODIUM 5000 UNITS: 5000 INJECTION INTRAVENOUS; SUBCUTANEOUS at 05:47

## 2024-05-29 RX ADMIN — METHOCARBAMOL 500 MG: 500 TABLET ORAL at 12:07

## 2024-05-29 RX ADMIN — METHOCARBAMOL 500 MG: 500 TABLET ORAL at 23:04

## 2024-05-29 RX ADMIN — SODIUM CHLORIDE 3 G: 9 INJECTION, SOLUTION INTRAVENOUS at 22:22

## 2024-05-29 RX ADMIN — ACETAMINOPHEN 975 MG: 325 TABLET, FILM COATED ORAL at 21:03

## 2024-05-29 RX ADMIN — GABAPENTIN 100 MG: 100 CAPSULE ORAL at 15:39

## 2024-05-29 RX ADMIN — POLYETHYLENE GLYCOL 3350, SODIUM SULFATE ANHYDROUS, SODIUM BICARBONATE, SODIUM CHLORIDE, POTASSIUM CHLORIDE 4000 ML: 236; 22.74; 6.74; 5.86; 2.97 POWDER, FOR SOLUTION ORAL at 16:41

## 2024-05-29 RX ADMIN — HYDROMORPHONE HYDROCHLORIDE 1 MG: 1 INJECTION, SOLUTION INTRAMUSCULAR; INTRAVENOUS; SUBCUTANEOUS at 01:20

## 2024-05-29 RX ADMIN — HYDROMORPHONE HYDROCHLORIDE 1 MG: 1 INJECTION, SOLUTION INTRAMUSCULAR; INTRAVENOUS; SUBCUTANEOUS at 10:37

## 2024-05-29 RX ADMIN — GABAPENTIN 100 MG: 100 CAPSULE ORAL at 20:42

## 2024-05-29 RX ADMIN — GABAPENTIN 100 MG: 100 CAPSULE ORAL at 09:07

## 2024-05-29 RX ADMIN — OXYCODONE HYDROCHLORIDE 10 MG: 10 TABLET ORAL at 15:38

## 2024-05-29 RX ADMIN — ACETAMINOPHEN 975 MG: 325 TABLET, FILM COATED ORAL at 05:47

## 2024-05-29 RX ADMIN — OXYCODONE HYDROCHLORIDE 10 MG: 10 TABLET ORAL at 03:31

## 2024-05-29 RX ADMIN — METHOCARBAMOL 500 MG: 500 TABLET ORAL at 16:39

## 2024-05-29 RX ADMIN — SODIUM CHLORIDE 3 G: 9 INJECTION, SOLUTION INTRAVENOUS at 04:56

## 2024-05-29 RX ADMIN — METHOCARBAMOL 500 MG: 500 TABLET ORAL at 05:47

## 2024-05-29 RX ADMIN — PANTOPRAZOLE SODIUM 40 MG: 40 INJECTION, POWDER, FOR SOLUTION INTRAVENOUS at 09:07

## 2024-05-29 NOTE — CONSULTS
[facility] - Inpatient Consultation    PATIENT INFORMATION      Diogenes Mullins 38 y.o. male MRN: [id number]  Unit/Bed#: [facility] 925-01 Encounter: [id number]  PCP: No primary care provider on file.  Date of Admission:  5/22/2024  Date of Consultation: 05/29/24  Requesting Physician: Isauro Clifton,*       ASSESSMENT & PLAN     Diogenes Mullins is a 38 y.o. old male with no significant PMH who presented with 1 wk hx of nausea, vomiting, RUQ pain and was found to have a perforated liver abscess with peritonitis.     Perforated Hepatic Abscess  Transaminitis, resolved  Hyperbilirubinemia  -Brief hospital course: Presented on 5/22 to [facility] with 1 wk hx of nausea, vomiting, RUQ pain.  CT A/P was concerning for perforated liver abscess with peritonitis. Transferred to [facility]. On 5/22 pt underwent ex lap lavage and drain placement. 4 SYMONE drains placed. Was started on vancomycin, cefepime, and flagyl. Cultures grew Group F Strep and Prevotella. Abx narrowed to Unasyn. IR was consulted on 5/26, and pt underwent US-guided drain placement. 270mL of purulent appearing blood and debris was aspirated and sent for culture and sensitivities.  -Feeling well this am, normal bowel movements; abdominal exam TTP  -WBC 21.49 from 22.58, Hgb stable at 7.8 from 7.7, Plt 821 from 632     Ddx: liver abscess with unclear etiology; possibly 2/2 infectious gastroenteritis vs abdominal infection vs biliary ductal infection vs intraluminal lesion     Plan:  -recommend EGD/colonoscopy inpatient    -ordered bowel prep  -clear liquid diet  -NPO at midnight  -Bili downtrending, continue to monitor  -cont abx per ID  -monitor fever curve and WBC count  -f/u repeat fluid cultures     REASON FOR CONSULTATION      Liver abscess      HISTORY OF PRESENT ILLNESS      Diogenes Mullins is a 38 y.o. male with no significant PMH who presented on 5/22 to [facility] with 1 wk hx of nausea, vomiting, RUQ pain. Was a sepsis alert at  [place] and CT A/P was concerning for perforated liver abscess with peritonitis. Pt was transferred to [facility]. On 5/22 pt underwent ex laplavage and drain placement. 4 SYMONE drains placed. Was started on vancomycin, cefepime, and flagyl. Cultures grew Group F Strep and Prevotella. Abx were narrowed to Unasyn. IR was consulted on 5/26, and pt underwent US-guided drain placement. 270mL of purulent appearing blood and debris was aspirated and sent for culture and sensitivities. We are consulted because ID recommends EGD/colonoscopy to evaluate for intraluminal lesion as portal of entry for infection.     Pt seen and examined at bedside. States pain today is 3/10. Last bowel movement was this am, was loose and brown. Has occasional SOB with excessive movement. Has been ambulatory. Denies fevers, chills, hematochezia, melena, nausea, hematemesis, pruritus, reflux, LH, or dizziness.       REVIEW OF SYSTEMS     CONSTITUTIONAL: Denies any fever, chills, rigors, and weight loss  HEENT: No earache or tinnitus, denies hearing loss or visual disturbances  CARDIOVASCULAR: No chest pain or palpitations   RESPIRATORY: +SOB/SPARKS; Denies any cough or hemoptysis  GASTROINTESTINAL: As noted in the History of Present Illness   GENITOURINARY: No problems with urination, denies any hematuria or dysuria  NEUROLOGIC: No dizziness or vertigo, denies headaches   MUSCULOSKELETAL: Denies any muscle or joint pain   SKIN: Denies skin rashes or itching   ENDOCRINE: Denies excessive thirst, denies intolerance to heat or cold  PSYCHOSOCIAL: Denies depression or anxiety, denies any recent memory loss     PAST MEDICAL & SURGICAL HISTORY      No past medical history on file.    Past Surgical History:   Procedure Laterality Date    NE LAPS ABD PRTM&OMENTUM DX W/WO SPEC BR/WA SPX N/A 5/22/2024    Procedure: DIAGNOSTIC LAPAROSCOPY CONVERTED TO EXPLORATORY LAPAROTOMY, DRAINAGE OF HEPATIC ABSCESS, 4 QUADRANT PERITONEAL LAVAGE, PLACEMENT OF DRAINS.;  Surgeon:  "Isauro Clifton, DO;  Location: [facility];  Service: General       MEDICATIONS & ALLERGIES       Medications:   No medications prior to admission.  Current Facility-Administered Medications   Medication Dose Route Frequency    acetaminophen (TYLENOL) tablet 975 mg  975 mg Oral Q8H PRO    albuterol (PROVENTIL HFA,VENTOLIN HFA) inhaler 2 puff  2 puff Inhalation Q4H PRN    ampicillin-sulbactam (UNASYN) 3 g in sodium chloride 0.9 % 100 mL IVPB  3 g Intravenous Q6H    gabapentin (NEURONTIN) capsule 100 mg  100 mg Oral TID    heparin (porcine) subcutaneous injection 5,000 Units  5,000 Units Subcutaneous Q8H PRO    HYDROmorphone (DILAUDID) injection 1 mg  1 mg Intravenous Q3H PRN    methocarbamol (ROBAXIN) tablet 500 mg  500 mg Oral Q6H PRO    naloxone (NARCAN) 0.04 mg/mL syringe 0.04 mg  0.04 mg Intravenous Q1MIN PRN    ondansetron (ZOFRAN) injection 4 mg  4 mg Intravenous Q4H PRN    oxyCODONE (ROXICODONE) immediate release tablet 10 mg  10 mg Oral Q4H PRN    Or    oxyCODONE (ROXICODONE) IR tablet 5 mg  5 mg Oral Q4H PRN    pantoprazole (PROTONIX) injection 40 mg  40 mg Intravenous Q24H PRO    scopolamine (TRANSDERM-SCOP) 1 mg/3 days TD 72 hr patch 1 patch  1 patch Transdermal Q72H       Allergies:   No Known Allergies    SOCIAL HISTORY      Social History     Marital Status: Single    Substance Use History:   Social History     Substance and Sexual Activity   Alcohol Use Not Currently     Social History     Tobacco Use   Smoking Status Former    Types: Cigarettes   Smokeless Tobacco Never     Social History     Substance and Sexual Activity   Drug Use Never       FAMILY HISTORY      No family history on file.    PHYSICAL EXAM     Objective   Blood pressure 115/67, pulse (!) 114, temperature 99.3 °F (37.4 °C), resp. rate 18, height 5' 10\" (1.778 m), weight 129 kg (283 lb 15.2 oz), SpO2 95%. Body mass index is 40.74 kg/m².    Intake/Output Summary (Last 24 hours) at 5/29/2024 0757  Last data filed at 5/29/2024 " 0551  Gross per 24 hour   Intake 100 ml   Output 1785 ml   Net -1685 ml       General Appearance:   Alert, cooperative, no distress   HEENT:   Normocephalic, atraumatic, anicteric     Neck:   Supple, symmetrical, trachea midline   Lungs:   Equal chest rise, respirations unlabored    Heart:   Regular rhythm; +tachycardic   Abdomen:   Soft, +tender, +distended; normal bowel sounds; no masses, no organomegaly; +drains in place;    Rectal:   Deferred    Extremities:   No cyanosis, clubbing; +swelling in feet bilaterally    Neuro:   Moves all 4 extremities    Skin:   No jaundice, rashes, or lesions      ADDITIONAL DATA     Lab Results:     Results from last 7 days   Lab Units 05/29/24  0459   WBC Thousand/uL 21.49*   HEMOGLOBIN g/dL 7.8*   HEMATOCRIT % 24.5*   PLATELETS Thousands/uL 821*   LYMPHO PCT % 1*   MONO PCT % 4   EOS PCT % 0     Results from last 7 days   Lab Units 05/29/24  0459 05/28/24  0358 05/22/24  1440 05/22/24  1339   POTASSIUM mmol/L 4.7 4.1   < >  --    CHLORIDE mmol/L 99 99   < >  --    CO2 mmol/L 27 27   < >  --    CO2, I-STAT mmol/L  --   --   --  19*   BUN mg/dL 8 10   < >  --    CREATININE mg/dL 0.55* 0.48*   < >  --    CALCIUM mg/dL 7.2* 6.9*   < >  --    ALK PHOS U/L  --  117*   < >  --    ALT U/L  --  45   < >  --    AST U/L  --  37   < >  --    GLUCOSE, ISTAT mg/dl  --   --   --  125    < > = values in this interval not displayed.     Results from last 7 days   Lab Units 05/23/24  0435   INR  1.40*       Imaging:    CT chest abdomen pelvis w contrast    Result Date: 5/26/2024  Narrative: CT CHEST, ABDOMEN AND PELVIS WITH IV CONTRAST INDICATION: increasing leukocytosis s/p ex lap for purulent peritonitis. COMPARISON: 5/22/2020 TECHNIQUE: CT examination of the chest, abdomen and pelvis was performed. Multiplanar 2D reformatted images were created from the source data. This examination, like all CT scans performed in the [facility], was performed utilizing techniques to minimize  radiation dose exposure, including the use of iterative reconstruction and automated exposure control. Radiation dose length product (DLP) for this visit: 2259 mGy-cm IV Contrast: 100 mL of iohexol (OMNIPAQUE) Enteric Contrast: Not administered. FINDINGS: CHEST LUNGS: Bibasilar atelectasis is present. No tracheal or endobronchial lesion. PLEURA: Trace right pleural effusion is present. HEART/GREAT VESSELS: Heart is unremarkable for patient's age. No thoracic aortic aneurysm. MEDIASTINUM AND NEETU: Unremarkable. CHEST WALL AND LOWER NECK: Unremarkable. ABDOMEN LIVER/BILIARY TREE: 9.6 x 6.9 cm hepatic abscess within the right lobe of the liver again identified. Decreasing internal gas is noted. Overall size is slightly increased from the previous exam. Interval increase in associated subcapsular collection measuring 9.5 x 4.2 cm is noted. 11 mm hepatic cyst is present. GALLBLADDER: No calcified gallstones. No pericholecystic inflammatory change. SPLEEN: Unremarkable. PANCREAS: Unremarkable. ADRENAL GLANDS: Unremarkable. KIDNEYS/URETERS: Unremarkable. No hydronephrosis. STOMACH AND BOWEL: Unremarkable. APPENDIX: No findings to suggest appendicitis. ABDOMINOPELVIC CAVITY: Perihepatic and pelvic drainage catheter is again noted. No ascites. No pneumoperitoneum. No lymphadenopathy. VESSELS: Unremarkable for patient's age. PELVIS REPRODUCTIVE ORGANS: Unremarkable for patient's age. URINARY BLADDER: Unremarkable. ABDOMINAL WALL/INGUINAL REGIONS: Midline laparotomy defect is present. Associated postoperative changes involving the abdominal wall noted. BONES: No acute fracture or suspicious osseous lesion.     Impression: 9.6 x 6.9 cm hepatic abscess within the right lobe of the liver again identified. Decreasing internal gas is noted however overall size is increased from prior exam. Interval increase in associated overlying subcapsular collection. Bibasilar atelectasis and trace right pleural effusion. Workstation performed:  [id number]     XR chest portable    Result Date: 5/26/2024  Narrative: XR CHEST PORTABLE INDICATION: SOB. COMPARISON: CXR and chest CT 5/22/2024. FINDINGS: Low lung volumes with moderate bibasilar atelectasis. No pneumothorax or pleural effusion. Drain projecting over the inferior medial right lower chest/right upper quadrant. Normal cardiomediastinal silhouette. Bones are unremarkable for age. Normal upper abdomen. Partially imaged drain in the left lower quadrant.     Impression: Low lung volumes with bibasilar atelectasis. Pneumonia not excluded in the appropriate clinical setting. Workstation performed: [id number]     XR chest portable ICU    Result Date: 5/23/2024  Narrative: XR CHEST PORTABLE ICU INDICATION: Post-op line/tube confirmation. COMPARISON: CT chest abdomen pelvis and chest radiograph 5/22/2024 FINDINGS: Tip of endotracheal tube projects approximately 4.9 cm above the allan. Tip of right IJ central line projects over the cavoatrial junction. Enteric tube sideport projects at the level of the GE junction. Tip of esophageal temperature probe projects over  the middle mediastinum. Right upper quadrant tube is visualized. Right basilar atelectasis. Trace bilateral pleural effusions better seen on same-day CT. Left lower lobe atelectasis versus infiltrate. No pneumothorax. Normal cardiomediastinal silhouette. Bones are unremarkable for age. Normal upper abdomen.     Impression: Enteric tube should be advanced 5 to 10 cm. Other tubes and lines as above. Right basilar atelectasis and left lower lobe atelectasis versus infiltrate. Trace bilateral pleural effusions better seen on same-day CT. The study was marked in EPIC for immediate notification. Resident: Rey Vega I, the attending radiologist, have reviewed the images and agree with the final report above. Workstation performed: [id number]     US right upper quadrant    Result Date: 5/22/2024  Narrative: RIGHT UPPER QUADRANT ULTRASOUND  INDICATION: Liver abscess. COMPARISON: None TECHNIQUE: Real-time ultrasound of the right upper quadrant was performed with a curvilinear transducer with both volumetric sweeps and still imaging techniques. FINDINGS: PANCREAS: Visualized portions of the pancreas are within normal limits. AORTA AND IVC: Visualized portions are normal for patient age. LIVER: Size: Enlarged. The liver measures 24.5 cm in the midclavicular line. Contour: Surface contour is smooth. Parenchyma: Echogenicity and echotexture are within normal limits. 8.8 x 8.3 x 7.1 cm gas-containing right hepatic lobe lesion again identified in keeping with hepatic abscess identified on CT of 5/22/2024. 12 mm hyperechoic focus is noted within the right lobe of the liver potentially a small hemangioma. Limited imaging of the main portal vein shows it to be patent and hepatopetal. BILIARY: No gallbladder findings. No intrahepatic biliary dilatation. CBD measures 5.0 mm. No choledocholithiasis. KIDNEY: Right kidney measures 11.4 x 6.7 x 6.6 cm. Volume 262.6 mL Kidney within normal limits. ASCITES: Small volume of ascites again present.     Impression: 8.8 x 8.3 x 7.1 cm gas-containing right hepatic lobe lesion in keeping with previously identified lung abscess. Small volume of ascites. Pronounced hepatomegaly. 12 mm right hepatic lobe focus potentially a small hemangioma. Workstation performed: [id number]     XR chest 1 view portable    Result Date: 5/22/2024  Narrative: XR CHEST PORTABLE INDICATION: SOB. COMPARISON: None FINDINGS: Hyperinflated lungs no pneumothorax or pleural effusion. Normal cardiomediastinal silhouette. Bones are unremarkable for age. Normal upper abdomen.     Impression: No acute consolidation or congestion Workstation performed: [id number]     CT chest abdomen pelvis wo contrast    Result Date: 5/22/2024  Narrative: CT CHEST, ABDOMEN AND PELVIS WITHOUT IV CONTRAST INDICATION: sepsis. COMPARISON: None. TECHNIQUE: CT examination of the  chest, abdomen and pelvis was performed without intravenous contrast. Multiplanar 2D reformatted images were created from the source data. This examination, like all CT scans performed in the [facility], was performed utilizing techniques to minimize radiation dose exposure, including the use of iterative reconstruction and automated exposure control. Radiation dose length product (DLP) for this visit: 1620 mGy-cm Enteric Contrast: Not administered. FINDINGS: CHEST LUNGS: Subsegmental bibasilar atelectasis PLEURA: Trace bilateral pleural effusions HEART/GREAT VESSELS: Heart is unremarkable for patient's age. No thoracic aortic aneurysm. MEDIASTINUM AND NEETU: Unremarkable. CHEST WALL AND LOWER NECK: Unremarkable. ABDOMEN LIVER/BILIARY TREE: Collection of fluid and gas in the right hepatic lobe measuring 8.9 x 5.9 cm in greatest axial dimensions. Small amount of high attenuation material within this collection may represent hemorrhagic or proteinaceous contents. Subcapsular collection extending along the right hepatic lobe which measures 9.3 x 2.1 x 7.4 cm.  Subcentimeter focus of low-attenuation in the central liver dome, indeterminate, probably hepatic cyst. GALLBLADDER: No calcified gallstones. No pericholecystic inflammatory change. SPLEEN: Unremarkable. PANCREAS: Unremarkable. ADRENAL GLANDS: Unremarkable. KIDNEYS/URETERS: Unremarkable. No hydronephrosis. STOMACH AND BOWEL: Colonic diverticulosis without findings of acute diverticulitis. APPENDIX: No findings to suggest appendicitis. ABDOMINOPELVIC CAVITY: Moderate volume of complex ascites, prominent in the perihepatic region, with a small amount of free air, with suspected peritoneal thickening and associated mesenteric edema. No lymphadenopathy. VESSELS: Unremarkable for patient's age. PELVIS REPRODUCTIVE ORGANS: Unremarkable for patient's age. URINARY BLADDER: Unremarkable. ABDOMINAL WALL/INGUINAL REGIONS: Unremarkable. BONES: No acute  fracture or suspicious osseous lesion. Spinal degenerative changes.     Impression: Findings are most concerning for perforated liver abscess with resultant peritonitis. I personally discussed this study with MOISES PERALES on 5/22/2024 4:05 AM. Workstation performed: [id number]       EKG, Pathology, and Other Studies Reviewed on Admission:   EKG: Reviewed      Counseling / Coordination of Care Time: 30 total mins spent in consult. Greater than 50% of total time spent on patient counseling and coordination of care.    Estelita Soriano DO  PGY-1  Internal Medicine  [facility]    ...............................................................................................................................................  ** Please Note: This note is constructed using a voice recognition dictation system. **

## 2024-05-29 NOTE — PROGRESS NOTES
Progress Note - Infectious Disease   Diogenes Mullins 38 y.o. male MRN: [id number]  [facility] Encounter: [id number]      Impression/Plan:  1.  Severe sepsis.  Tachycardia, tachypnea, leukocytosis, elevated lactic acid.  Secondary to #2.  Blood cultures are negative.  Has remained afebrile for the past 48 hrs.     -Antibiotic plan as below  -Follow temperatures and hemodynamics  -Check CBC in AM to assess treatment response    2.  Perforated liver abscess with purulent peritonitis.  Unclear etiology, risk factors. Consider biliary anatomic abnormality versus intraluminal GI lesion.  Abdominal CT with no other acute abnormalities including evidence of malignancy. RUQ US with no biliary abnormalities.  No FH of GI malignancy.  Status post exploratory laparotomy with drainage of abscess and four-quadrant peritoneal lavage 5/22. Intraoperative findings included extensive four-quadrant purulent peritonitis with near frozen abdomen due to inflammation.  OR cultures with Group F Strep (unusual biliary pathogen), prevotella.  Pathology negative for malignancy.  Repeat CT showed persistent abscess.  Status post IR drain 5/26 yielding 270 cc pus.  Culture again with growth of group F strep thus far.     -Continue Unasyn for now  -GI consult for consideration of bidirectional endoscopy to evaluate for intraluminal lesion as portal of entry for infection  -Follow up repeat fluid cultures until finalized  -Follow drain output  -Surgery follow-up ongoing  -Serial abdominal exams    3.  Acute respiratory insufficiency.  Now extubated, down to room air.    -Follow O2 requirements.  -Volume management per surgery service    4.  Acute kidney injury.  In the setting of severe sepsis.  No acute  findings on CT.  Creatinine has improved.    5.  Elevated LFTs.  In the setting of #1/#2.  No radiologic evidence of biliary obstruction.  LFTs continue to improve.    Antibiotics:  Unasyn  Antibiotics  8  POD7    Subjective:  Patient has no fever, chills, sweats; no nausea, vomiting, diarrhea; no cough, shortness of breath; no pain. No new symptoms.    Objective:  Vitals:  Temp:  [98.8 °F (37.1 °C)-99.3 °F (37.4 °C)] 99.3 °F (37.4 °C)  HR:  [114] 114  Resp:  [16-18] 18  BP: (115-116)/(67-68) 115/67  SpO2:  [95 %] 95 %  Temp (24hrs), Av.1 °F (37.3 °C), Min:98.8 °F (37.1 °C), Max:99.3 °F (37.4 °C)  Current: Temperature: 99.3 °F (37.4 °C)    Physical Exam:   General Appearance:  Alert, interactive, nontoxic, no acute distress.   Throat: Oropharynx moist without lesions.    Lungs:   Clear to auscultation bilaterally; no wheezes, rhonchi or rales; respirations unlabored   Heart:  RRR; no murmur, rub or gallop   Abdomen:   Drain in place. Soft, non-tender, non-distended, positive bowel sounds.     Extremities: No clubbing, cyanosis or edema   Skin: No new rashes or lesions. No draining wounds noted.       Labs, Imaging, & Other studies:   All pertinent labs and imaging studies were personally reviewed  Results from last 7 days   Lab Units 24  0459 24  0339 24  0411   WBC Thousand/uL 21.49* 22.58* 21.99*   HEMOGLOBIN g/dL 7.8* 7.7* 8.2*   PLATELETS Thousands/uL 821* 632* 507*     Results from last 7 days   Lab Units 24  0459 24  0358 24  0411 24  1029 24  0529 24  1440 24  1339   SODIUM mmol/L 132* 134* 135  --  133*   < >  --    POTASSIUM mmol/L 4.7 4.1 4.6  --  4.2   < >  --    CHLORIDE mmol/L 99 99 99  --  98   < >  --    CO2 mmol/L 27 27 28  --  27   < >  --    CO2, I-STAT mmol/L  --   --   --   --   --   --  19*   BUN mg/dL 8 10 13  --  16   < >  --    CREATININE mg/dL 0.55* 0.48* 0.53*  --  0.55*   < >  --    EGFR ml/min/1.73sq m 131 138 133  --  131   < >  --    GLUCOSE, ISTAT mg/dl  --   --   --   --   --   --  125   CALCIUM mg/dL 7.2* 6.9* 6.7*  --  6.7*   < >  --    AST U/L  --  37  --  64* 73*   < >  --    ALT U/L  --  45  --  67* 69*   < >  --     ALK PHOS U/L  --  117*  --  151* 128*   < >  --     < > = values in this interval not displayed.     Results from last 7 days   Lab Units 05/26/24  1536 05/22/24  1203   GRAM STAIN RESULT  2+ Polys*  1+ Gram positive cocci in chains and clusters* 4+ Polys*  4+ Gram negative rods*  1+ Gram positive cocci in pairs*   BODY FLUID CULTURE, STERILE  2+ Growth of Beta Hemolytic Streptococcus Group F* 4+ Growth of Beta Hemolytic Streptococcus Group F*                       Sridhar Saunders D.O.  PGY-3 Internal Medicine   [facility]

## 2024-05-29 NOTE — PROGRESS NOTES
"Progress Note - General Surgery   Diogenes Mullins 38 y.o. male MRN: [id number]  Unit/Bed#: [facility] 925-01 Encounter: [id number]    Assessment:  38-year-old male with perforated hepatic abscess status post 5/22 diagnostic laparoscopy converted to exploratory laparotomy with drainage of hepatic abscess, four-quadrant peritoneal lavage 2/2 purulent peritonitis and drain placements.   Persistent hepatic abscess s/p 5/26 IR drain placement within abscess cavity     Tachycardic to 120s, afebrile  WBC 21.5 from 22.6  Hemoglobin 7.8 from 7.7  Creatinine 0.55    UOP 1750    Surgical SYMONE drains x4 with total output 25 cc serosanguineous  IR drain 10 cc bilious    Plan:  -Regular diet as tolerated   -will order EKG and troponins in setting of tachycardia  -ID on board, appreciate recommendations, on Unasyn, will consult GI for possible EGD/colonoscopy to investigate intraluminal source of liver abscess/infection as recommended, continue to follow cultures  -[facility]  -Pain control  -Encourage ambulation  -Incentive spirometry    Subjective/Objective   Subjective:   Patient doing well this morning, feels well with some continued abdominal pain that is well-controlled, tolerating diet without nausea or emesis, passing flatus and having bowel movements, ambulating, out of bed to chair, voiding, using incentive spirometry.    Objective:     Blood pressure 115/67, pulse (!) 114, temperature 99.3 °F (37.4 °C), resp. rate 18, height 5' 10\" (1.778 m), weight 129 kg (283 lb 15.2 oz), SpO2 95%.,Body mass index is 40.74 kg/m².      Intake/Output Summary (Last 24 hours) at 5/29/2024 0804  Last data filed at 5/29/2024 0551  Gross per 24 hour   Intake 100 ml   Output 1785 ml   Net -1685 ml       Invasive Devices       Peripheral Intravenous Line  Duration             Peripheral IV 05/27/24 Right Antecubital 2 days    Peripheral IV 05/29/24 Right;Ventral (anterior) Forearm <1 day              Drain  Duration             Closed/Suction Drain LLQ Bulb " 19 Fr. 6 days    Closed/Suction Drain LUQ Bulb 19 Fr. 6 days    Closed/Suction Drain Right RLQ Bulb 19 Fr. 6 days    Closed/Suction Drain Right RUQ Bulb 19 Fr. 6 days    Abscess Drain Back 2 days                    Physical Exam:   General: NAD  Skin: Warm, dry, anicteric  HEENT: Normocephalic, atraumatic  CV: RRR, no m/r/g  Pulm: CTA b/l, no inc WOB  Abd: Soft, ND, minimally tender to palpation non specific, midline w packing present, surgical and IR drains as above total x5  MSK: Symmetric, no edema, no tenderness, no deformity  Neuro: AOx3, GCS 15     Lab, Imaging and other studies:I have personally reviewed pertinent lab results.  , CBC:   Lab Results   Component Value Date    WBC 21.49 (H) 05/29/2024    HGB 7.8 (L) 05/29/2024    HCT 24.5 (L) 05/29/2024    MCV 94 05/29/2024     (H) 05/29/2024    RBC 2.62 (L) 05/29/2024    MCH 29.8 05/29/2024    MCHC 31.8 05/29/2024    RDW 15.9 (H) 05/29/2024    MPV 9.0 05/29/2024   , CMP:   Lab Results   Component Value Date    SODIUM 132 (L) 05/29/2024    K 4.7 05/29/2024    CL 99 05/29/2024    CO2 27 05/29/2024    BUN 8 05/29/2024    CREATININE 0.55 (L) 05/29/2024    CALCIUM 7.2 (L) 05/29/2024    EGFR 131 05/29/2024     VTE Pharmacologic Prophylaxis: Heparin  VTE Mechanical Prophylaxis: sequential compression device, Minimally tender nonspecific, midline incision with packing present,

## 2024-05-30 ENCOUNTER — APPOINTMENT (INPATIENT)
Dept: GASTROENTEROLOGY | Facility: HOSPITAL | Age: 39
DRG: 710 | End: 2024-05-30
Payer: COMMERCIAL

## 2024-05-30 ENCOUNTER — ANESTHESIA (INPATIENT)
Dept: GASTROENTEROLOGY | Facility: HOSPITAL | Age: 39
DRG: 710 | End: 2024-05-30
Payer: COMMERCIAL

## 2024-05-30 ENCOUNTER — ANESTHESIA EVENT (INPATIENT)
Dept: GASTROENTEROLOGY | Facility: HOSPITAL | Age: 39
DRG: 710 | End: 2024-05-30
Payer: COMMERCIAL

## 2024-05-30 LAB
ANION GAP SERPL CALCULATED.3IONS-SCNC: 7 MMOL/L (ref 4–13)
BACTERIA SPEC ANAEROBE CULT: ABNORMAL
BACTERIA SPEC ANAEROBE CULT: ABNORMAL
BACTERIA SPEC BFLD CULT: ABNORMAL
BUN SERPL-MCNC: 6 MG/DL (ref 5–25)
CALCIUM SERPL-MCNC: 7.5 MG/DL (ref 8.4–10.2)
CHLORIDE SERPL-SCNC: 101 MMOL/L (ref 96–108)
CO2 SERPL-SCNC: 30 MMOL/L (ref 21–32)
CREAT SERPL-MCNC: 0.56 MG/DL (ref 0.6–1.3)
ERYTHROCYTE [DISTWIDTH] IN BLOOD BY AUTOMATED COUNT: 16.6 % (ref 11.6–15.1)
GFR SERPL CREATININE-BSD FRML MDRD: 130 ML/MIN/1.73SQ M
GLUCOSE SERPL-MCNC: 104 MG/DL (ref 65–140)
GRAM STN SPEC: ABNORMAL
GRAM STN SPEC: ABNORMAL
HCT VFR BLD AUTO: 24.5 % (ref 36.5–49.3)
HGB BLD-MCNC: 7.7 G/DL (ref 12–17)
MCH RBC QN AUTO: 30.1 PG (ref 26.8–34.3)
MCHC RBC AUTO-ENTMCNC: 31.4 G/DL (ref 31.4–37.4)
MCV RBC AUTO: 96 FL (ref 82–98)
PLATELET # BLD AUTO: 931 THOUSANDS/UL (ref 149–390)
PMV BLD AUTO: 9 FL (ref 8.9–12.7)
POTASSIUM SERPL-SCNC: 3.9 MMOL/L (ref 3.5–5.3)
RBC # BLD AUTO: 2.56 MILLION/UL (ref 3.88–5.62)
SODIUM SERPL-SCNC: 138 MMOL/L (ref 135–147)
WBC # BLD AUTO: 17.77 THOUSAND/UL (ref 4.31–10.16)

## 2024-05-30 PROCEDURE — 0DBK8ZX EXCISION OF ASCENDING COLON, VIA NATURAL OR ARTIFICIAL OPENING ENDOSCOPIC, DIAGNOSTIC: ICD-10-PCS | Performed by: INTERNAL MEDICINE

## 2024-05-30 PROCEDURE — 80048 BASIC METABOLIC PNL TOTAL CA: CPT | Performed by: NURSE PRACTITIONER

## 2024-05-30 PROCEDURE — 88305 TISSUE EXAM BY PATHOLOGIST: CPT | Performed by: PATHOLOGY

## 2024-05-30 PROCEDURE — 0DB78ZX EXCISION OF STOMACH, PYLORUS, VIA NATURAL OR ARTIFICIAL OPENING ENDOSCOPIC, DIAGNOSTIC: ICD-10-PCS | Performed by: INTERNAL MEDICINE

## 2024-05-30 PROCEDURE — 99232 SBSQ HOSP IP/OBS MODERATE 35: CPT | Performed by: INTERNAL MEDICINE

## 2024-05-30 PROCEDURE — 45380 COLONOSCOPY AND BIOPSY: CPT | Performed by: INTERNAL MEDICINE

## 2024-05-30 PROCEDURE — 45385 COLONOSCOPY W/LESION REMOVAL: CPT | Performed by: INTERNAL MEDICINE

## 2024-05-30 PROCEDURE — 88313 SPECIAL STAINS GROUP 2: CPT | Performed by: PATHOLOGY

## 2024-05-30 PROCEDURE — 85027 COMPLETE CBC AUTOMATED: CPT | Performed by: NURSE PRACTITIONER

## 2024-05-30 PROCEDURE — 43239 EGD BIOPSY SINGLE/MULTIPLE: CPT | Performed by: INTERNAL MEDICINE

## 2024-05-30 PROCEDURE — 0DB98ZX EXCISION OF DUODENUM, VIA NATURAL OR ARTIFICIAL OPENING ENDOSCOPIC, DIAGNOSTIC: ICD-10-PCS | Performed by: INTERNAL MEDICINE

## 2024-05-30 PROCEDURE — 0DBH8ZX EXCISION OF CECUM, VIA NATURAL OR ARTIFICIAL OPENING ENDOSCOPIC, DIAGNOSTIC: ICD-10-PCS | Performed by: INTERNAL MEDICINE

## 2024-05-30 PROCEDURE — 99024 POSTOP FOLLOW-UP VISIT: CPT | Performed by: STUDENT IN AN ORGANIZED HEALTH CARE EDUCATION/TRAINING PROGRAM

## 2024-05-30 PROCEDURE — 88342 IMHCHEM/IMCYTCHM 1ST ANTB: CPT | Performed by: PATHOLOGY

## 2024-05-30 PROCEDURE — C9113 INJ PANTOPRAZOLE SODIUM, VIA: HCPCS | Performed by: NURSE PRACTITIONER

## 2024-05-30 PROCEDURE — 88341 IMHCHEM/IMCYTCHM EA ADD ANTB: CPT | Performed by: PATHOLOGY

## 2024-05-30 RX ORDER — PANTOPRAZOLE SODIUM 40 MG/1
40 TABLET, DELAYED RELEASE ORAL
Status: DISCONTINUED | OUTPATIENT
Start: 2024-05-30 | End: 2024-06-05 | Stop reason: HOSPADM

## 2024-05-30 RX ORDER — LIDOCAINE HYDROCHLORIDE 10 MG/ML
INJECTION, SOLUTION EPIDURAL; INFILTRATION; INTRACAUDAL; PERINEURAL AS NEEDED
Status: DISCONTINUED | OUTPATIENT
Start: 2024-05-30 | End: 2024-05-30

## 2024-05-30 RX ORDER — SODIUM CHLORIDE 9 MG/ML
INJECTION, SOLUTION INTRAVENOUS CONTINUOUS PRN
Status: DISCONTINUED | OUTPATIENT
Start: 2024-05-30 | End: 2024-05-30

## 2024-05-30 RX ORDER — PROPOFOL 10 MG/ML
INJECTION, EMULSION INTRAVENOUS AS NEEDED
Status: DISCONTINUED | OUTPATIENT
Start: 2024-05-30 | End: 2024-05-30

## 2024-05-30 RX ORDER — KETAMINE HCL IN NACL, ISO-OSM 100MG/10ML
SYRINGE (ML) INJECTION AS NEEDED
Status: DISCONTINUED | OUTPATIENT
Start: 2024-05-30 | End: 2024-05-30

## 2024-05-30 RX ORDER — PROPOFOL 10 MG/ML
INJECTION, EMULSION INTRAVENOUS CONTINUOUS PRN
Status: DISCONTINUED | OUTPATIENT
Start: 2024-05-30 | End: 2024-05-30

## 2024-05-30 RX ORDER — FENTANYL CITRATE 50 UG/ML
INJECTION, SOLUTION INTRAMUSCULAR; INTRAVENOUS AS NEEDED
Status: DISCONTINUED | OUTPATIENT
Start: 2024-05-30 | End: 2024-05-30

## 2024-05-30 RX ORDER — GLYCOPYRROLATE 0.2 MG/ML
INJECTION INTRAMUSCULAR; INTRAVENOUS AS NEEDED
Status: DISCONTINUED | OUTPATIENT
Start: 2024-05-30 | End: 2024-05-30

## 2024-05-30 RX ADMIN — FENTANYL CITRATE 50 MCG: 50 INJECTION INTRAMUSCULAR; INTRAVENOUS at 10:49

## 2024-05-30 RX ADMIN — SODIUM CHLORIDE: 0.9 INJECTION, SOLUTION INTRAVENOUS at 10:37

## 2024-05-30 RX ADMIN — PROPOFOL 120 MCG/KG/MIN: 10 INJECTION, EMULSION INTRAVENOUS at 11:14

## 2024-05-30 RX ADMIN — PROPOFOL 25 MG: 10 INJECTION, EMULSION INTRAVENOUS at 10:54

## 2024-05-30 RX ADMIN — PROPOFOL 25 MG: 10 INJECTION, EMULSION INTRAVENOUS at 10:51

## 2024-05-30 RX ADMIN — SODIUM CHLORIDE 3 G: 9 INJECTION, SOLUTION INTRAVENOUS at 19:41

## 2024-05-30 RX ADMIN — Medication 20 MG: at 10:58

## 2024-05-30 RX ADMIN — PROPOFOL 25 MG: 10 INJECTION, EMULSION INTRAVENOUS at 11:06

## 2024-05-30 RX ADMIN — OXYCODONE HYDROCHLORIDE 10 MG: 10 TABLET ORAL at 04:11

## 2024-05-30 RX ADMIN — GABAPENTIN 100 MG: 100 CAPSULE ORAL at 08:26

## 2024-05-30 RX ADMIN — PROPOFOL 25 MG: 10 INJECTION, EMULSION INTRAVENOUS at 10:47

## 2024-05-30 RX ADMIN — PROPOFOL 25 MG: 10 INJECTION, EMULSION INTRAVENOUS at 10:59

## 2024-05-30 RX ADMIN — ACETAMINOPHEN 975 MG: 325 TABLET, FILM COATED ORAL at 13:40

## 2024-05-30 RX ADMIN — SODIUM CHLORIDE 3 G: 9 INJECTION, SOLUTION INTRAVENOUS at 13:41

## 2024-05-30 RX ADMIN — PROPOFOL 25 MG: 10 INJECTION, EMULSION INTRAVENOUS at 11:08

## 2024-05-30 RX ADMIN — OXYCODONE HYDROCHLORIDE 10 MG: 10 TABLET ORAL at 19:41

## 2024-05-30 RX ADMIN — PROPOFOL 100 MG: 10 INJECTION, EMULSION INTRAVENOUS at 10:43

## 2024-05-30 RX ADMIN — PROPOFOL 25 MG: 10 INJECTION, EMULSION INTRAVENOUS at 10:52

## 2024-05-30 RX ADMIN — PANTOPRAZOLE SODIUM 40 MG: 40 INJECTION, POWDER, FOR SOLUTION INTRAVENOUS at 08:26

## 2024-05-30 RX ADMIN — PROPOFOL 25 MG: 10 INJECTION, EMULSION INTRAVENOUS at 11:00

## 2024-05-30 RX ADMIN — FENTANYL CITRATE 25 MCG: 50 INJECTION INTRAMUSCULAR; INTRAVENOUS at 10:38

## 2024-05-30 RX ADMIN — METHOCARBAMOL 500 MG: 500 TABLET ORAL at 17:17

## 2024-05-30 RX ADMIN — GABAPENTIN 100 MG: 100 CAPSULE ORAL at 21:41

## 2024-05-30 RX ADMIN — Medication 5 MG: at 10:38

## 2024-05-30 RX ADMIN — PROPOFOL 50 MG: 10 INJECTION, EMULSION INTRAVENOUS at 10:44

## 2024-05-30 RX ADMIN — PANTOPRAZOLE SODIUM 40 MG: 40 TABLET, DELAYED RELEASE ORAL at 15:06

## 2024-05-30 RX ADMIN — PROPOFOL 25 MG: 10 INJECTION, EMULSION INTRAVENOUS at 10:48

## 2024-05-30 RX ADMIN — Medication 40 MG: at 10:50

## 2024-05-30 RX ADMIN — PROPOFOL 25 MG: 10 INJECTION, EMULSION INTRAVENOUS at 11:02

## 2024-05-30 RX ADMIN — HYDROMORPHONE HYDROCHLORIDE 1 MG: 1 INJECTION, SOLUTION INTRAMUSCULAR; INTRAVENOUS; SUBCUTANEOUS at 05:33

## 2024-05-30 RX ADMIN — GABAPENTIN 100 MG: 100 CAPSULE ORAL at 15:06

## 2024-05-30 RX ADMIN — OXYCODONE HYDROCHLORIDE 10 MG: 10 TABLET ORAL at 23:52

## 2024-05-30 RX ADMIN — METHOCARBAMOL 500 MG: 500 TABLET ORAL at 23:52

## 2024-05-30 RX ADMIN — HEPARIN SODIUM 5000 UNITS: 5000 INJECTION INTRAVENOUS; SUBCUTANEOUS at 21:41

## 2024-05-30 RX ADMIN — PROPOFOL 25 MG: 10 INJECTION, EMULSION INTRAVENOUS at 10:57

## 2024-05-30 RX ADMIN — PROPOFOL 25 MG: 10 INJECTION, EMULSION INTRAVENOUS at 11:10

## 2024-05-30 RX ADMIN — OXYCODONE HYDROCHLORIDE 10 MG: 10 TABLET ORAL at 00:10

## 2024-05-30 RX ADMIN — Medication 25 MG: at 10:43

## 2024-05-30 RX ADMIN — PROPOFOL 25 MG: 10 INJECTION, EMULSION INTRAVENOUS at 10:56

## 2024-05-30 RX ADMIN — PROPOFOL 25 MG: 10 INJECTION, EMULSION INTRAVENOUS at 10:46

## 2024-05-30 RX ADMIN — HEPARIN SODIUM 5000 UNITS: 5000 INJECTION INTRAVENOUS; SUBCUTANEOUS at 13:48

## 2024-05-30 RX ADMIN — PROPOFOL 25 MG: 10 INJECTION, EMULSION INTRAVENOUS at 10:55

## 2024-05-30 RX ADMIN — PROPOFOL 25 MG: 10 INJECTION, EMULSION INTRAVENOUS at 10:50

## 2024-05-30 RX ADMIN — FENTANYL CITRATE 25 MCG: 50 INJECTION INTRAMUSCULAR; INTRAVENOUS at 10:43

## 2024-05-30 RX ADMIN — LIDOCAINE HYDROCHLORIDE 50 MG: 10 INJECTION, SOLUTION EPIDURAL; INFILTRATION; INTRACAUDAL; PERINEURAL at 10:43

## 2024-05-30 RX ADMIN — OXYCODONE HYDROCHLORIDE 10 MG: 10 TABLET ORAL at 13:41

## 2024-05-30 RX ADMIN — HYDROMORPHONE HYDROCHLORIDE 0.5 MG: 1 INJECTION, SOLUTION INTRAMUSCULAR; INTRAVENOUS; SUBCUTANEOUS at 18:00

## 2024-05-30 RX ADMIN — GLYCOPYRROLATE 0.1 MG: 0.2 INJECTION, SOLUTION INTRAMUSCULAR; INTRAVENOUS at 09:53

## 2024-05-30 RX ADMIN — PROPOFOL 25 MG: 10 INJECTION, EMULSION INTRAVENOUS at 11:01

## 2024-05-30 RX ADMIN — SODIUM CHLORIDE 3 G: 9 INJECTION, SOLUTION INTRAVENOUS at 04:27

## 2024-05-30 RX ADMIN — PROPOFOL 25 MG: 10 INJECTION, EMULSION INTRAVENOUS at 10:58

## 2024-05-30 RX ADMIN — PROPOFOL 25 MG: 10 INJECTION, EMULSION INTRAVENOUS at 11:04

## 2024-05-30 RX ADMIN — PROPOFOL 25 MG: 10 INJECTION, EMULSION INTRAVENOUS at 10:49

## 2024-05-30 RX ADMIN — PROPOFOL 25 MG: 10 INJECTION, EMULSION INTRAVENOUS at 10:45

## 2024-05-30 RX ADMIN — PROPOFOL 25 MG: 10 INJECTION, EMULSION INTRAVENOUS at 11:13

## 2024-05-30 RX ADMIN — HYDROMORPHONE HYDROCHLORIDE 1 MG: 1 INJECTION, SOLUTION INTRAMUSCULAR; INTRAVENOUS; SUBCUTANEOUS at 01:17

## 2024-05-30 RX ADMIN — LIDOCAINE HYDROCHLORIDE 50 MG: 10 INJECTION, SOLUTION EPIDURAL; INFILTRATION; INTRACAUDAL; PERINEURAL at 10:38

## 2024-05-30 RX ADMIN — PROPOFOL 25 MG: 10 INJECTION, EMULSION INTRAVENOUS at 10:53

## 2024-05-30 RX ADMIN — ACETAMINOPHEN 975 MG: 325 TABLET, FILM COATED ORAL at 21:41

## 2024-05-30 NOTE — ANESTHESIA PREPROCEDURE EVALUATION
Procedure:  EGD  COLONOSCOPY  Assessment  38M with perforated hepatic abscess s/p 5/22 dx lap converted to exlap with drainage of hepatic abscess, 4-quadrant peritoneal lavage 2/2 purulent peritonitis and drain placements.  Persistent hepatic abscess s/p 5/26 IR drain placement within abscess cavity.     Plan  - NPO for GI EGD/cscope today  - appreciate ID recs, on Unasyn, follow cultures  - DVT ppx      Relevant Problems   GI/HEPATIC   (+) Liver abscess      Impression/Recommendations:  1.  Severe sepsis.  Tachycardia, tachypnea, leukocytosis, elevated lactic acid.  Secondary to #2.  Blood cultures are negative.  Ongoing fevers likely due to lack of source control, now status post IR drain.     -Antibiotic plan as below  -Follow temperatures and hemodynamics  -Check CBC in AM to assess treatment response     2.  Perforated liver abscess with purulent peritonitis.  Unclear etiology, risk factors. Consider biliary anatomic abnormality versus intraluminal GI lesion.  Abdominal CT with no other acute abnormalities including evidence of malignancy. RUQ US with no biliary abnormalities.  No FH of GI malignancy.  Status post exploratory laparotomy with drainage of abscess and four-quadrant peritoneal lavage 5/22. Intraoperative findings included extensive four-quadrant purulent peritonitis with near frozen abdomen due to inflammation.  OR cultures with Group F Strep (unusual biliary pathogen), prevotella.  Pathology negative for malignancy.  Repeat CT showed persistent abscess.  Status post IR drain 5/26 yielding 270 cc pus.  Culture again with growth of group F strep thus far.     -Continue Unasyn for now  -Follow up repeat fluid cultures until finalized  -Follow drain output  -Surgery follow-up ongoing  -Serial abdominal exams  -Recommend EGD/colonoscopy to evaluate for intraluminal lesion as portal of entry for infection     3.  Acute respiratory insufficiency.  Now extubated, down to room air.     -Follow O2  requirements.  -Volume management per surgery service     4.  Acute kidney injury.  In the setting of severe sepsis.  No acute  findings on CT.  Creatinine has improved.     5.  Elevated LFTs.  In the setting of #1/#2.  No radiologic evidence of biliary obstruction.  LFTs continue to improve.     Above plan was discussed with GI service who agrees with EGD/colonoscopy.     Physical Exam    Airway    Mallampati score: III  TM Distance: >3 FB  Neck ROM: full     Dental       Cardiovascular  Cardiovascular exam normal    Pulmonary  Pulmonary exam normal     Other Findings        Anesthesia Plan  ASA Score- 3     Anesthesia Type- IV sedation with anesthesia with ASA Monitors.         Additional Monitors:     Airway Plan:            Plan Factors-Exercise tolerance (METS): <4 METS.    Chart reviewed. EKG reviewed. Imaging results reviewed. Existing labs reviewed. Patient summary reviewed.    Patient is not a current smoker. Patient not instructed to abstain from smoking on day of procedure.             Induction- intravenous.    Postoperative Plan-     Perioperative Resuscitation Plan - Level 1 - Full Code.       Informed Consent- Anesthetic plan and risks discussed with patient.  I personally reviewed this patient with the CRNA. Discussed and agreed on the Anesthesia Plan with the CRNA..

## 2024-05-30 NOTE — PROGRESS NOTES
"Progress Note  Diogenes Mullins 38 y.o. male MRN: [id number]  Unit/Bed#: [facility] 925-01 Encounter: [id number]    Assessment  38M with perforated hepatic abscess s/p 5/22 dx lap converted to exlap with drainage of hepatic abscess, 4-quadrant peritoneal lavage 2/2 purulent peritonitis and drain placements.  Persistent hepatic abscess s/p 5/26 IR drain placement within abscess cavity.    Plan  - NPO for GI EGD/cscope today  - appreciate ID recs, on Unasyn, follow cultures  - DVT ppx    Subjective/Objective   NAOE. Pain controlled. No n/v. Having liquid brown stools, passing flatus. Voiding. Walking.    Tachy to low 100s, other VSS on RA.  /69   Pulse 105   Temp 98.3 °F (36.8 °C)   Resp 18   Ht 5' 10\" (1.778 m)   Wt 128 kg (281 lb 8.4 oz)   SpO2 100%   BMI 40.39 kg/m²     Physical Exam:  General: NAD  CV: nl rate  Lungs: nl wob. No resp distress.  ABD: Soft, ND, NT, midline w packing present, dressing w mild ss strikethru, surgical and IR drains total x5   Extrem: No CCE    Back abscess IR drain: 20cc purulent  LLQ SYMONE: 40cc serous  LUQ SYMONE: 55cc serous  RLQ SYMONE: 1cc serous  RUQ SYMONE: 10cc serous  UOP: 3580cc    5/26 abscess cx: group F strep  05/30/24 labs pending  Recent Labs     05/28/24  0358 05/29/24  0459 05/30/24  0440   WBC  --  21.49* 17.77*   HGB  --  7.8* 7.7*   PLT  --  821* 931*   SODIUM 134* 132*  --    K 4.1 4.7  --    CL 99 99  --    CO2 27 27  --    BUN 10 8  --    CREATININE 0.48* 0.55*  --    GLUC 113 109  --    CALCIUM 6.9* 7.2*  --    AST 37  --   --    ALT 45  --   --    ALKPHOS 117*  --   --    TBILI 0.95  --   --    EGFR 138 131  --        "

## 2024-05-30 NOTE — PROGRESS NOTES
Progress Note - Infectious Disease   Diogenes Mullins 38 y.o. male MRN: [id number]  [facility] Encounter: [id number]      Impression/Recommendations:  1.  Severe sepsis.  Tachycardia, tachypnea, leukocytosis, elevated lactic acid.  Secondary to #2.  Blood cultures are negative.  WBC count continues to trend down.     -Antibiotic plan as below  -Follow temperatures and hemodynamics  -Check CBC in AM to assess treatment response     2.  Perforated liver abscess with purulent peritonitis.  Unclear etiology, risk factors. Consider biliary anatomic abnormality versus intraluminal GI lesion.  Abdominal CT with no other acute abnormalities including evidence of malignancy. RUQ US with no biliary abnormalities.  No FH of GI malignancy.  Status post exploratory laparotomy with drainage of abscess and four-quadrant peritoneal lavage 5/22. Intraoperative findings included extensive four-quadrant purulent peritonitis with near frozen abdomen due to inflammation.  OR cultures with Group F Strep (unusual biliary pathogen), prevotella.  Pathology negative for malignancy.  Repeat CT showed persistent abscess.  Status post IR drain 5/26 yielding 270 cc pus.  Culture again with growth of group F strep thus far.  EGD with single ulcer in stomach and colonoscopy showed 1 polyp in the ascending colon as well as edematous mucosa in cecum.  Biopsies performed.     -Continue Unasyn for now  -Follow up anaerobic culture until final.  -Follow drain output  -Surgery follow-up ongoing  -Serial abdominal exams  -Biopsy results pending  -Will likely need repeat CT potentially early next week.     3.  Acute respiratory insufficiency.  Now remains stable on room air.     -Follow O2 requirements.  -Volume management per surgery service     4.  Acute kidney injury.  In the setting of severe sepsis.  No acute  findings on CT.  Creatinine has improved.     5.  Elevated LFTs.  In the setting of #1/#2.  No radiologic evidence of biliary  obstruction.  LFTs continue to improve.     I discussed above plan with patient and his mother at bedside and answered all questions.    Antibiotics:  Unasyn  Antibiotics 9  POD8            Subjective:  Patient is back from EGD and colonoscopy.  He is hungry and about to eat his lunch.  Tmax 100.8.  No shortness of breath.  Pain remains manageable.    Objective:  Vitals:  Temp:  [98.3 °F (36.8 °C)-100.8 °F (38.2 °C)] 100.8 °F (38.2 °C)  HR:  [105-120] 118  Resp:  [18-20] 18  BP: (107-136)/(57-73) 116/58  SpO2:  [94 %-100 %] 96 %  Temp (24hrs), Av.4 °F (37.4 °C), Min:98.3 °F (36.8 °C), Max:100.8 °F (38.2 °C)  Current: Temperature: (!) 100.8 °F (38.2 °C)    Physical Exam:   General:  No acute distress  HEENT atraumatic normocephalic  Psychiatric:  Awake and alert  Pulmonary:  Normal respiratory excursion without accessory muscle use  Abdomen: Nondistended with no rigidity or guarding  Drains in place  Extremities:  No edema  Skin:  No rashes  Neuro moves all extremities spontaneously    Lab Results:  I have personally reviewed pertinent labs.  Results from last 7 days   Lab Units 24  0440 24  0459 24  0358 24  0411 24  1029 24  0529   POTASSIUM mmol/L 3.9 4.7 4.1   < >  --  4.2   CHLORIDE mmol/L 101 99 99   < >  --  98   CO2 mmol/L 30 27 27   < >  --  27   BUN mg/dL 6 8 10   < >  --  16   CREATININE mg/dL 0.56* 0.55* 0.48*   < >  --  0.55*   EGFR ml/min/1.73sq m 130 131 138   < >  --  131   CALCIUM mg/dL 7.5* 7.2* 6.9*   < >  --  6.7*   AST U/L  --   --  37  --  64* 73*   ALT U/L  --   --  45  --  67* 69*   ALK PHOS U/L  --   --  117*  --  151* 128*    < > = values in this interval not displayed.     Results from last 7 days   Lab Units 24  0440 24  0459 24  0339   WBC Thousand/uL 17.77* 21.49* 22.58*   HEMOGLOBIN g/dL 7.7* 7.8* 7.7*   PLATELETS Thousands/uL 931* 821* 632*     Results from last 7 days   Lab Units 24  1536   GRAM STAIN RESULT  2+ Polys*   1+ Gram positive cocci in chains and clusters*   BODY FLUID CULTURE, STERILE  2+ Growth of Beta Hemolytic Streptococcus Group F*       Imaging Studies:   I have personally reviewed pertinent imaging study reports and images in PACS.    EKG, Pathology, and Other Studies:   I have personally reviewed pertinent reports.    EGD and colonoscopy reports were personally reviewed.

## 2024-05-30 NOTE — ANESTHESIA POSTPROCEDURE EVALUATION
Post-Op Assessment Note    CV Status:  Stable    Pain management: satisfactory to patient       Mental Status:  Alert and awake   Hydration Status:  Euvolemic   PONV Controlled:  Controlled   Airway Patency:  Patent     Post Op Vitals Reviewed: Yes    No anethesia notable event occurred.    Staff: CRNA               /57 (05/30/24 1136)    Temp (!) 100.8 °F (38.2 °C) (05/30/24 1136)    Pulse (!) 120 (05/30/24 1136)   Resp 20 (05/30/24 1136)    SpO2 97 % (05/30/24 1136)

## 2024-05-30 NOTE — QUICK NOTE
Patient seen and evaluated at bedside for midline packing change. Previous packing removed. Midline was repacked with 1/2 inch plain packing then covered with ABD and secured with tape. Patient tolerated the packing change well without any complications.

## 2024-05-31 ENCOUNTER — APPOINTMENT (INPATIENT)
Dept: RADIOLOGY | Facility: HOSPITAL | Age: 39
DRG: 710 | End: 2024-05-31
Payer: COMMERCIAL

## 2024-05-31 LAB
ANION GAP SERPL CALCULATED.3IONS-SCNC: 8 MMOL/L (ref 4–13)
BASOPHILS # BLD AUTO: 0.02 THOUSANDS/ÂΜL (ref 0–0.1)
BASOPHILS NFR BLD AUTO: 0 % (ref 0–1)
BUN SERPL-MCNC: 5 MG/DL (ref 5–25)
CALCIUM SERPL-MCNC: 7.1 MG/DL (ref 8.4–10.2)
CHLORIDE SERPL-SCNC: 103 MMOL/L (ref 96–108)
CO2 SERPL-SCNC: 26 MMOL/L (ref 21–32)
CREAT SERPL-MCNC: 0.55 MG/DL (ref 0.6–1.3)
EOSINOPHIL # BLD AUTO: 0.09 THOUSAND/ÂΜL (ref 0–0.61)
EOSINOPHIL NFR BLD AUTO: 1 % (ref 0–6)
ERYTHROCYTE [DISTWIDTH] IN BLOOD BY AUTOMATED COUNT: 16.7 % (ref 11.6–15.1)
GFR SERPL CREATININE-BSD FRML MDRD: 131 ML/MIN/1.73SQ M
GLUCOSE SERPL-MCNC: 117 MG/DL (ref 65–140)
HCT VFR BLD AUTO: 22.6 % (ref 36.5–49.3)
HGB BLD-MCNC: 7.1 G/DL (ref 12–17)
IMM GRANULOCYTES # BLD AUTO: 0.21 THOUSAND/UL (ref 0–0.2)
IMM GRANULOCYTES NFR BLD AUTO: 1 % (ref 0–2)
LYMPHOCYTES # BLD AUTO: 1.65 THOUSANDS/ÂΜL (ref 0.6–4.47)
LYMPHOCYTES NFR BLD AUTO: 11 % (ref 14–44)
MCH RBC QN AUTO: 30 PG (ref 26.8–34.3)
MCHC RBC AUTO-ENTMCNC: 31.4 G/DL (ref 31.4–37.4)
MCV RBC AUTO: 95 FL (ref 82–98)
MONOCYTES # BLD AUTO: 1.04 THOUSAND/ÂΜL (ref 0.17–1.22)
MONOCYTES NFR BLD AUTO: 7 % (ref 4–12)
NEUTROPHILS # BLD AUTO: 12.47 THOUSANDS/ÂΜL (ref 1.85–7.62)
NEUTS SEG NFR BLD AUTO: 80 % (ref 43–75)
NRBC BLD AUTO-RTO: 0 /100 WBCS
PLATELET # BLD AUTO: 952 THOUSANDS/UL (ref 149–390)
PMV BLD AUTO: 8.7 FL (ref 8.9–12.7)
POTASSIUM SERPL-SCNC: 4 MMOL/L (ref 3.5–5.3)
RBC # BLD AUTO: 2.37 MILLION/UL (ref 3.88–5.62)
SODIUM SERPL-SCNC: 137 MMOL/L (ref 135–147)
WBC # BLD AUTO: 15.48 THOUSAND/UL (ref 4.31–10.16)

## 2024-05-31 PROCEDURE — 76937 US GUIDE VASCULAR ACCESS: CPT

## 2024-05-31 PROCEDURE — NC001 PR NO CHARGE: Performed by: RADIOLOGY

## 2024-05-31 PROCEDURE — 99233 SBSQ HOSP IP/OBS HIGH 50: CPT | Performed by: INTERNAL MEDICINE

## 2024-05-31 PROCEDURE — 74177 CT ABD & PELVIS W/CONTRAST: CPT

## 2024-05-31 PROCEDURE — 49423 EXCHANGE DRAINAGE CATHETER: CPT | Performed by: RADIOLOGY

## 2024-05-31 PROCEDURE — 99024 POSTOP FOLLOW-UP VISIT: CPT | Performed by: STUDENT IN AN ORGANIZED HEALTH CARE EDUCATION/TRAINING PROGRAM

## 2024-05-31 PROCEDURE — 75984 XRAY CONTROL CATHETER CHANGE: CPT | Performed by: RADIOLOGY

## 2024-05-31 PROCEDURE — 80048 BASIC METABOLIC PNL TOTAL CA: CPT | Performed by: NURSE PRACTITIONER

## 2024-05-31 PROCEDURE — 0F20X0Z CHANGE DRAINAGE DEVICE IN LIVER, EXTERNAL APPROACH: ICD-10-PCS | Performed by: RADIOLOGY

## 2024-05-31 PROCEDURE — 49423 EXCHANGE DRAINAGE CATHETER: CPT

## 2024-05-31 PROCEDURE — C1729 CATH, DRAINAGE: HCPCS

## 2024-05-31 PROCEDURE — 76604 US EXAM CHEST: CPT | Performed by: RADIOLOGY

## 2024-05-31 PROCEDURE — 85025 COMPLETE CBC W/AUTO DIFF WBC: CPT | Performed by: NURSE PRACTITIONER

## 2024-05-31 PROCEDURE — NC001 PR NO CHARGE: Performed by: NURSE PRACTITIONER

## 2024-05-31 PROCEDURE — C1769 GUIDE WIRE: HCPCS

## 2024-05-31 RX ORDER — LIDOCAINE WITH 8.4% SOD BICARB 0.9%(10ML)
SYRINGE (ML) INJECTION AS NEEDED
Status: DISCONTINUED | OUTPATIENT
Start: 2024-05-31 | End: 2024-06-05 | Stop reason: HOSPADM

## 2024-05-31 RX ORDER — METHOCARBAMOL 750 MG/1
750 TABLET, FILM COATED ORAL EVERY 6 HOURS SCHEDULED
Status: DISCONTINUED | OUTPATIENT
Start: 2024-05-31 | End: 2024-06-05 | Stop reason: HOSPADM

## 2024-05-31 RX ORDER — FENTANYL CITRATE 50 UG/ML
INJECTION, SOLUTION INTRAMUSCULAR; INTRAVENOUS AS NEEDED
Status: DISCONTINUED | OUTPATIENT
Start: 2024-05-31 | End: 2024-06-05 | Stop reason: HOSPADM

## 2024-05-31 RX ADMIN — OXYCODONE HYDROCHLORIDE 10 MG: 10 TABLET ORAL at 03:53

## 2024-05-31 RX ADMIN — GABAPENTIN 100 MG: 100 CAPSULE ORAL at 21:14

## 2024-05-31 RX ADMIN — HYDROMORPHONE HYDROCHLORIDE 1 MG: 1 INJECTION, SOLUTION INTRAMUSCULAR; INTRAVENOUS; SUBCUTANEOUS at 01:57

## 2024-05-31 RX ADMIN — METHOCARBAMOL 750 MG: 750 TABLET ORAL at 17:39

## 2024-05-31 RX ADMIN — HYDROMORPHONE HYDROCHLORIDE 1 MG: 1 INJECTION, SOLUTION INTRAMUSCULAR; INTRAVENOUS; SUBCUTANEOUS at 08:43

## 2024-05-31 RX ADMIN — IOHEXOL 100 ML: 350 INJECTION, SOLUTION INTRAVENOUS at 10:00

## 2024-05-31 RX ADMIN — ACETAMINOPHEN 975 MG: 325 TABLET, FILM COATED ORAL at 14:35

## 2024-05-31 RX ADMIN — HYDROMORPHONE HYDROCHLORIDE 1 MG: 1 INJECTION, SOLUTION INTRAMUSCULAR; INTRAVENOUS; SUBCUTANEOUS at 04:55

## 2024-05-31 RX ADMIN — OXYCODONE HYDROCHLORIDE 10 MG: 10 TABLET ORAL at 21:14

## 2024-05-31 RX ADMIN — GABAPENTIN 100 MG: 100 CAPSULE ORAL at 08:07

## 2024-05-31 RX ADMIN — SODIUM CHLORIDE 3 G: 9 INJECTION, SOLUTION INTRAVENOUS at 08:08

## 2024-05-31 RX ADMIN — SODIUM CHLORIDE 3 G: 9 INJECTION, SOLUTION INTRAVENOUS at 14:40

## 2024-05-31 RX ADMIN — PANTOPRAZOLE SODIUM 40 MG: 40 TABLET, DELAYED RELEASE ORAL at 08:07

## 2024-05-31 RX ADMIN — OXYCODONE HYDROCHLORIDE 10 MG: 10 TABLET ORAL at 15:14

## 2024-05-31 RX ADMIN — HEPARIN SODIUM 5000 UNITS: 5000 INJECTION INTRAVENOUS; SUBCUTANEOUS at 14:40

## 2024-05-31 RX ADMIN — METHOCARBAMOL 750 MG: 750 TABLET ORAL at 23:37

## 2024-05-31 RX ADMIN — HYDROMORPHONE HYDROCHLORIDE 1 MG: 1 INJECTION, SOLUTION INTRAMUSCULAR; INTRAVENOUS; SUBCUTANEOUS at 23:39

## 2024-05-31 RX ADMIN — ACETAMINOPHEN 975 MG: 325 TABLET, FILM COATED ORAL at 05:00

## 2024-05-31 RX ADMIN — ACETAMINOPHEN 975 MG: 325 TABLET, FILM COATED ORAL at 21:14

## 2024-05-31 RX ADMIN — SODIUM CHLORIDE 3 G: 9 INJECTION, SOLUTION INTRAVENOUS at 21:18

## 2024-05-31 RX ADMIN — GABAPENTIN 100 MG: 100 CAPSULE ORAL at 17:39

## 2024-05-31 RX ADMIN — Medication 10 ML: at 16:40

## 2024-05-31 RX ADMIN — HEPARIN SODIUM 5000 UNITS: 5000 INJECTION INTRAVENOUS; SUBCUTANEOUS at 21:15

## 2024-05-31 RX ADMIN — HEPARIN SODIUM 5000 UNITS: 5000 INJECTION INTRAVENOUS; SUBCUTANEOUS at 05:00

## 2024-05-31 RX ADMIN — METHOCARBAMOL 750 MG: 750 TABLET ORAL at 12:38

## 2024-05-31 RX ADMIN — METHOCARBAMOL 500 MG: 500 TABLET ORAL at 05:00

## 2024-05-31 RX ADMIN — FENTANYL CITRATE 50 MCG: 50 INJECTION INTRAMUSCULAR; INTRAVENOUS at 16:22

## 2024-05-31 RX ADMIN — SCOPOLAMINE 1 PATCH: 1.5 PATCH, EXTENDED RELEASE TRANSDERMAL at 05:44

## 2024-05-31 RX ADMIN — HYDROMORPHONE HYDROCHLORIDE 1 MG: 1 INJECTION, SOLUTION INTRAMUSCULAR; INTRAVENOUS; SUBCUTANEOUS at 12:38

## 2024-05-31 RX ADMIN — OXYCODONE HYDROCHLORIDE 10 MG: 10 TABLET ORAL at 08:07

## 2024-05-31 RX ADMIN — PANTOPRAZOLE SODIUM 40 MG: 40 TABLET, DELAYED RELEASE ORAL at 17:39

## 2024-05-31 RX ADMIN — SODIUM CHLORIDE 3 G: 9 INJECTION, SOLUTION INTRAVENOUS at 01:54

## 2024-05-31 RX ADMIN — IOHEXOL 20 ML: 350 INJECTION, SOLUTION INTRAVENOUS at 16:59

## 2024-05-31 RX ADMIN — FENTANYL CITRATE 50 MCG: 50 INJECTION INTRAMUSCULAR; INTRAVENOUS at 16:41

## 2024-05-31 NOTE — RESTORATIVE TECHNICIAN NOTE
Restorative Technician Note      Patient Name: Diogenes Mullins     [facility] Tech Visit Date: 05/31/24  Note Type: Mobility  Patient Position Upon Consult: Supine  Activity Performed: Ambulated  Assistive Device: Other (Comment) (None)  Patient Position at End of Consult: Supine; All needs within reach; Other (comment) (Supine on transport stretcher; Left in the care of the transport team)    Dwight Corrales, Restorative Technician

## 2024-05-31 NOTE — PROGRESS NOTES
"Progress Note - General Surgery   Diogenes Mullins 38 y.o. male MRN: [id number]  Unit/Bed#: [facility] 925-01 Encounter: [id number]    Assessment:  38-year-old male with perforated hepatic abscess status post 5/22 diagnostic laparoscopy converted to exploratory laparotomy with drainage of hepatic abscess, four-quadrant peritoneal lavage 2/2 purulent peritonitis and drain placements.   Persistent hepatic abscess s/p 5/26 IR drain placement within abscess cavity     Went for colonoscopy and EGD w GI yesterday:  - colonoscopy: Edematous and nodular mucosa in cecum, polypoid appearing mucosa of the appendiceal orifice, sessile polyp in ascending colon removed  - EGD: Erythematous mucosa with erosion and exudate in the lesser curve of stomach, single ulcer greater curve with clean base Alfred 3, normal duodenum     Tachycardic to 110s, last febrile to 100.8 around 1130 yesterday morning  WBC 15.5 from 17.8  Hemoglobin 7.1 from 7.7  Creatinine 0.55    UOP 1950     Surgical SYMONE drains x4 serous 115cc   IR drain 0cc recorded, bilious/turbid in bulb    Plan:  -will repeat CT AP to re assess given known collections and persistent wbc and tachycardia, fevers  -Regular diet  -PPI bid x3 months per GI recs given endoscopy findings, follow path  -ID on board, appreciate recommendations, on Unasyn  -midline packing changes  -monitor drains output and character  -SQH  -Pain control  -Encourage ambulation  -Incentive spirometry    Subjective/Objective   Subjective:   Doing well, tolerating diet without n/v, having bowel function, voiding, ambulating, using IS.    Objective:     Blood pressure 104/61, pulse (!) 114, temperature 100.1 °F (37.8 °C), resp. rate 16, height 5' 10\" (1.778 m), weight 128 kg (281 lb 8.4 oz), SpO2 97%.,Body mass index is 40.39 kg/m².      Intake/Output Summary (Last 24 hours) at 5/31/2024 0637  Last data filed at 5/31/2024 0201  Gross per 24 hour   Intake 300 ml   Output 2065 ml   Net -1765 ml       Invasive Devices  "      Peripheral Intravenous Line  Duration             Peripheral IV 05/27/24 Right Antecubital 4 days    Peripheral IV 05/29/24 Right;Ventral (anterior) Forearm 2 days              Drain  Duration             Closed/Suction Drain LLQ Bulb 19 Fr. 8 days    Closed/Suction Drain LUQ Bulb 19 Fr. 8 days    Closed/Suction Drain Right RLQ Bulb 19 Fr. 8 days    Closed/Suction Drain Right RUQ Bulb 19 Fr. 8 days    Abscess Drain Back 4 days                    Physical Exam:   General: NAD  Skin: Warm, dry, anicteric  HEENT: Normocephalic, atraumatic  CV: RRR, no m/r/g  Pulm: CTA b/l, no inc WOB  Abd: Soft, ND, minimally tender, midline w packing, drains as above  MSK: Symmetric, no edema, no tenderness, no deformity  Neuro: AOx3, GCS 15     Lab, Imaging and other studies:I have personally reviewed pertinent lab results.  , CBC:   Lab Results   Component Value Date    WBC 15.48 (H) 05/31/2024    HGB 7.1 (L) 05/31/2024    HCT 22.6 (L) 05/31/2024    MCV 95 05/31/2024     (H) 05/31/2024    RBC 2.37 (L) 05/31/2024    MCH 30.0 05/31/2024    MCHC 31.4 05/31/2024    RDW 16.7 (H) 05/31/2024    MPV 8.7 (L) 05/31/2024    NRBC 0 05/31/2024   , CMP:   Lab Results   Component Value Date    SODIUM 137 05/31/2024    K 4.0 05/31/2024     05/31/2024    CO2 26 05/31/2024    BUN 5 05/31/2024    CREATININE 0.55 (L) 05/31/2024    CALCIUM 7.1 (L) 05/31/2024    EGFR 131 05/31/2024     VTE Pharmacologic Prophylaxis: Heparin  VTE Mechanical Prophylaxis: sequential compression device

## 2024-05-31 NOTE — BRIEF OP NOTE (RAD/CATH)
INTERVENTIONAL RADIOLOGY PROCEDURE NOTE    Date: 5/31/2024    Procedure: IR DRAINAGE TUBE CHECK/CHANGE/REPOSITION/REINSERTION/UPSIZE     Preoperative diagnosis:   1. Liver abscess    2. Empyema, left (HCC)         Postoperative diagnosis: Same.    Surgeon: Annamarie Longoria MD     Assistant: None. No qualified resident was available.    Blood loss: None    Specimens: None    Findings: Right upper quadrant liver abscess drain exchanged and repositioned into a large abscess pocket.  55 mL pus aspirated.    Ultrasound evaluation of the left chest did not identify a subpulmonic collection.  The fluid collection seen on CT is likely subdiaphragmatic.    Complications: None immediate.    Anesthesia: local and IV Fentanyl

## 2024-05-31 NOTE — PLAN OF CARE
Problem: Prexisting or High Potential for Compromised Skin Integrity  Goal: Skin integrity is maintained or improved  Description: INTERVENTIONS:  - Identify patients at risk for skin breakdown  - Assess and monitor skin integrity  - Assess and monitor nutrition and hydration status  - Monitor labs   - Assess for incontinence   - Turn and reposition patient  - Assist with mobility/ambulation  - Relieve pressure over bony prominences  - Avoid friction and shearing  - Provide appropriate hygiene as needed including keeping skin clean and dry  - Evaluate need for skin moisturizer/barrier cream  - Collaborate with interdisciplinary team   - Patient/family teaching  - Consider wound care consult   Outcome: Progressing     Problem: PAIN - ADULT  Goal: Verbalizes/displays adequate comfort level or baseline comfort level  Description: Interventions:  - Encourage patient to monitor pain and request assistance  - Assess pain using appropriate pain scale  - Administer analgesics based on type and severity of pain and evaluate response  - Implement non-pharmacological measures as appropriate and evaluate response  - Consider cultural and social influences on pain and pain management  - Notify physician/advanced practitioner if interventions unsuccessful or patient reports new pain  Outcome: Progressing     Problem: INFECTION - ADULT  Goal: Absence or prevention of progression during hospitalization  Description: INTERVENTIONS:  - Assess and monitor for signs and symptoms of infection  - Monitor lab/diagnostic results  - Monitor all insertion sites, i.e. indwelling lines, tubes, and drains  - Monitor endotracheal if appropriate and nasal secretions for changes in amount and color  - [place] appropriate cooling/warming therapies per order  - Administer medications as ordered  - Instruct and encourage patient and family to use good hand hygiene technique  - Identify and instruct in appropriate isolation precautions for  identified infection/condition  Outcome: Progressing     Problem: Nutrition/Hydration-ADULT  Goal: Nutrient/Hydration intake appropriate for improving, restoring or maintaining nutritional needs  Description: Monitor and assess patient's nutrition/hydration status for malnutrition. Collaborate with interdisciplinary team and initiate plan and interventions as ordered.  Monitor patient's weight and dietary intake as ordered or per policy. Utilize nutrition screening tool and intervene as necessary. Determine patient's food preferences and provide high-protein, high-caloric foods as appropriate.     INTERVENTIONS:  - Monitor oral intake, urinary output, labs, and treatment plans  - Assess nutrition and hydration status and recommend course of action  - Evaluate amount of meals eaten  - Assist patient with eating if necessary   - Allow adequate time for meals  - Recommend/ encourage appropriate diets, oral nutritional supplements, and vitamin/mineral supplements  - Order, calculate, and assess calorie counts as needed  - Recommend, monitor, and adjust tube feedings and TPN/PPN based on assessed needs  - Assess need for intravenous fluids  - Provide specific nutrition/hydration education as appropriate  - Include patient/family/caregiver in decisions related to nutrition  Outcome: Progressing

## 2024-05-31 NOTE — RESTORATIVE TECHNICIAN NOTE
Restorative Technician Note      Patient Name: Diogenes Mullins     [facility] Tech Visit Date: 05/31/24  Note Type: Mobility  Patient Position Upon Consult: Supine  Activity Performed: Ambulated  Assistive Device: Other (Comment) (None)  Patient Position at End of Consult: All needs within reach; Bedside chair    Dwight Corrales, Restorative Technician

## 2024-05-31 NOTE — PROGRESS NOTES
"The history and physical were reviewed, along with progress notes, and the patient was examined. There are no changes since it was written.    /78   Pulse (!) 110   Temp 98.5 °F (36.9 °C)   Resp 16   Ht 5' 10\" (1.778 m)   Wt 126 kg (277 lb 5.4 oz)   SpO2 99%   BMI 39.79 kg/m²      "

## 2024-05-31 NOTE — SEDATION DOCUMENTATION
RLQ drainage tube exchanged by Dr. Longoria. Pt tolerated the procedure well and will be returning to P9.

## 2024-05-31 NOTE — CONSULTS
"e-Consult (IPC)  - Interventional Radiology  Diogenes Mullins 38 y.o. male MRN: [id number]  Unit/Bed#: [facility] 925-01 Encounter: [id number]          Interventional Radiology has been consulted to evaluate Diogenes Mullins    We were consulted by General Surgery concerning this patient with possible left subpulmonic empyema on CT scan.    Inpatient Consult to IR  Consult performed by: GIFTY Valencia  Consult ordered by: Juan Pablo Petty MD        05/31/24    Assessment/Recommendation:   34 yo male wit perforated hepatic abscess s/p exlap drainage of hepatic abscess, four-quadrant peritoneal lavage and drain placement x4 on 5/22/24 c/b persistent hepatic abscess s/p IR hepatic abscess drain placement on 5/26/24.     General Surgery following. Patient with persistent leukocytosis, tachycardia, and decreased output from hepatic abscess drain.      Repeat CT A/P competed today demonstrating:   \"-Increased size of the moderate bilateral pleural effusions and bibasilar passive atelectasis.  -No significant change in the possible left subpulmonic empyema.  -Increased dilation of the small bowel with possible transition point may represent a small bowel obstruction or ileus.  -Decreased size of the hepatic abscess following drainage catheter placement.  -Decrease size of the subcapsular hepatic collection\".    IR consulted for evaluation of possible left subpulmonic empyema for possible drainage and surgery reporting decreased output from hepatic abscess drain.     -Discussed case and reviewed imaging with Dr Sewell.   -IR plan for hepatic abscess drain check today.   -Plan for IR left chest tube placement with pleural studies, pending IR scheduling.   -Left subpulmonic area of concern is small, may not be able to get into the space.   -remainder of care per Primary team        11-20 minutes, >50% of the total time devoted to medical consultative verbal/EMR discussion between providers. Written report will be generated " in the EMR.     Thank you for allowing Interventional Radiology to participate in the care of Diogenes Mullins. Please don't hesitate to call or TigerText us with any questions.     GIFTY Valencia

## 2024-06-01 ENCOUNTER — APPOINTMENT (INPATIENT)
Dept: RADIOLOGY | Facility: HOSPITAL | Age: 39
DRG: 710 | End: 2024-06-01
Payer: COMMERCIAL

## 2024-06-01 LAB
ANION GAP SERPL CALCULATED.3IONS-SCNC: 9 MMOL/L (ref 4–13)
ANISOCYTOSIS BLD QL SMEAR: PRESENT
BASOPHILS # BLD MANUAL: 0 THOUSAND/UL (ref 0–0.1)
BASOPHILS NFR MAR MANUAL: 0 % (ref 0–1)
BUN SERPL-MCNC: 6 MG/DL (ref 5–25)
CALCIUM SERPL-MCNC: 7.7 MG/DL (ref 8.4–10.2)
CHLORIDE SERPL-SCNC: 103 MMOL/L (ref 96–108)
CO2 SERPL-SCNC: 24 MMOL/L (ref 21–32)
CREAT SERPL-MCNC: 0.55 MG/DL (ref 0.6–1.3)
EOSINOPHIL # BLD MANUAL: 0 THOUSAND/UL (ref 0–0.4)
EOSINOPHIL NFR BLD MANUAL: 0 % (ref 0–6)
ERYTHROCYTE [DISTWIDTH] IN BLOOD BY AUTOMATED COUNT: 16.7 % (ref 11.6–15.1)
GFR SERPL CREATININE-BSD FRML MDRD: 131 ML/MIN/1.73SQ M
GLUCOSE SERPL-MCNC: 109 MG/DL (ref 65–140)
HCT VFR BLD AUTO: 26.1 % (ref 36.5–49.3)
HGB BLD-MCNC: 7.9 G/DL (ref 12–17)
LYMPHOCYTES # BLD AUTO: 0.21 THOUSAND/UL (ref 0.6–4.47)
LYMPHOCYTES # BLD AUTO: 1 % (ref 14–44)
MACROCYTES BLD QL AUTO: PRESENT
MAGNESIUM SERPL-MCNC: 1.9 MG/DL (ref 1.9–2.7)
MCH RBC QN AUTO: 29.6 PG (ref 26.8–34.3)
MCHC RBC AUTO-ENTMCNC: 30.3 G/DL (ref 31.4–37.4)
MCV RBC AUTO: 98 FL (ref 82–98)
MONOCYTES # BLD AUTO: 0.43 THOUSAND/UL (ref 0–1.22)
MONOCYTES NFR BLD: 2 % (ref 4–12)
NEUTROPHILS # BLD MANUAL: 20.76 THOUSAND/UL (ref 1.85–7.62)
NEUTS SEG NFR BLD AUTO: 97 % (ref 43–75)
PHOSPHATE SERPL-MCNC: 4 MG/DL (ref 2.7–4.5)
PLATELET # BLD AUTO: 1088 THOUSANDS/UL (ref 149–390)
PLATELET # BLD AUTO: 1136 THOUSANDS/UL (ref 149–390)
PLATELET BLD QL SMEAR: ABNORMAL
PMV BLD AUTO: 8.4 FL (ref 8.9–12.7)
PMV BLD AUTO: 8.6 FL (ref 8.9–12.7)
POLYCHROMASIA BLD QL SMEAR: PRESENT
POTASSIUM SERPL-SCNC: 4.3 MMOL/L (ref 3.5–5.3)
RBC # BLD AUTO: 2.67 MILLION/UL (ref 3.88–5.62)
RBC MORPH BLD: PRESENT
SODIUM SERPL-SCNC: 136 MMOL/L (ref 135–147)
WBC # BLD AUTO: 21.4 THOUSAND/UL (ref 4.31–10.16)

## 2024-06-01 PROCEDURE — 99024 POSTOP FOLLOW-UP VISIT: CPT | Performed by: SURGERY

## 2024-06-01 PROCEDURE — 83735 ASSAY OF MAGNESIUM: CPT

## 2024-06-01 PROCEDURE — 85007 BL SMEAR W/DIFF WBC COUNT: CPT | Performed by: NURSE PRACTITIONER

## 2024-06-01 PROCEDURE — 85049 AUTOMATED PLATELET COUNT: CPT

## 2024-06-01 PROCEDURE — 85027 COMPLETE CBC AUTOMATED: CPT | Performed by: NURSE PRACTITIONER

## 2024-06-01 PROCEDURE — 74018 RADEX ABDOMEN 1 VIEW: CPT

## 2024-06-01 PROCEDURE — 80048 BASIC METABOLIC PNL TOTAL CA: CPT | Performed by: NURSE PRACTITIONER

## 2024-06-01 PROCEDURE — 84100 ASSAY OF PHOSPHORUS: CPT

## 2024-06-01 RX ORDER — CALCIUM CARBONATE 500 MG/1
500 TABLET, CHEWABLE ORAL DAILY PRN
Status: DISCONTINUED | OUTPATIENT
Start: 2024-06-01 | End: 2024-06-05 | Stop reason: HOSPADM

## 2024-06-01 RX ORDER — PROMETHAZINE HYDROCHLORIDE 25 MG/ML
25 INJECTION, SOLUTION INTRAMUSCULAR; INTRAVENOUS EVERY 6 HOURS PRN
Status: DISCONTINUED | OUTPATIENT
Start: 2024-06-01 | End: 2024-06-05 | Stop reason: HOSPADM

## 2024-06-01 RX ORDER — SODIUM CHLORIDE, SODIUM LACTATE, POTASSIUM CHLORIDE, CALCIUM CHLORIDE 600; 310; 30; 20 MG/100ML; MG/100ML; MG/100ML; MG/100ML
100 INJECTION, SOLUTION INTRAVENOUS CONTINUOUS
Status: DISCONTINUED | OUTPATIENT
Start: 2024-06-01 | End: 2024-06-01

## 2024-06-01 RX ADMIN — METHOCARBAMOL 750 MG: 750 TABLET ORAL at 07:40

## 2024-06-01 RX ADMIN — HYDROMORPHONE HYDROCHLORIDE 1 MG: 1 INJECTION, SOLUTION INTRAMUSCULAR; INTRAVENOUS; SUBCUTANEOUS at 03:27

## 2024-06-01 RX ADMIN — PANTOPRAZOLE SODIUM 40 MG: 40 TABLET, DELAYED RELEASE ORAL at 07:40

## 2024-06-01 RX ADMIN — ACETAMINOPHEN 975 MG: 325 TABLET, FILM COATED ORAL at 07:39

## 2024-06-01 RX ADMIN — OXYCODONE HYDROCHLORIDE 10 MG: 10 TABLET ORAL at 07:41

## 2024-06-01 RX ADMIN — HYDROMORPHONE HYDROCHLORIDE 1 MG: 1 INJECTION, SOLUTION INTRAMUSCULAR; INTRAVENOUS; SUBCUTANEOUS at 20:15

## 2024-06-01 RX ADMIN — HYDROMORPHONE HYDROCHLORIDE 1 MG: 1 INJECTION, SOLUTION INTRAMUSCULAR; INTRAVENOUS; SUBCUTANEOUS at 15:47

## 2024-06-01 RX ADMIN — SODIUM CHLORIDE 3 G: 9 INJECTION, SOLUTION INTRAVENOUS at 03:27

## 2024-06-01 RX ADMIN — PANTOPRAZOLE SODIUM 40 MG: 40 TABLET, DELAYED RELEASE ORAL at 15:38

## 2024-06-01 RX ADMIN — HEPARIN SODIUM 5000 UNITS: 5000 INJECTION INTRAVENOUS; SUBCUTANEOUS at 13:58

## 2024-06-01 RX ADMIN — METHOCARBAMOL 750 MG: 750 TABLET ORAL at 23:21

## 2024-06-01 RX ADMIN — HEPARIN SODIUM 5000 UNITS: 5000 INJECTION INTRAVENOUS; SUBCUTANEOUS at 21:11

## 2024-06-01 RX ADMIN — OXYCODONE HYDROCHLORIDE 10 MG: 10 TABLET ORAL at 18:09

## 2024-06-01 RX ADMIN — OXYCODONE HYDROCHLORIDE 10 MG: 10 TABLET ORAL at 02:02

## 2024-06-01 RX ADMIN — ACETAMINOPHEN 975 MG: 325 TABLET, FILM COATED ORAL at 21:10

## 2024-06-01 RX ADMIN — GABAPENTIN 100 MG: 100 CAPSULE ORAL at 08:59

## 2024-06-01 RX ADMIN — METHOCARBAMOL 750 MG: 750 TABLET ORAL at 17:37

## 2024-06-01 RX ADMIN — CALCIUM CARBONATE (ANTACID) CHEW TAB 500 MG 500 MG: 500 CHEW TAB at 23:21

## 2024-06-01 RX ADMIN — PROMETHAZINE HYDROCHLORIDE 25 MG: 25 INJECTION INTRAMUSCULAR; INTRAVENOUS at 04:50

## 2024-06-01 RX ADMIN — METHOCARBAMOL 750 MG: 750 TABLET ORAL at 11:24

## 2024-06-01 RX ADMIN — HEPARIN SODIUM 5000 UNITS: 5000 INJECTION INTRAVENOUS; SUBCUTANEOUS at 07:40

## 2024-06-01 RX ADMIN — SODIUM CHLORIDE 3 G: 9 INJECTION, SOLUTION INTRAVENOUS at 08:59

## 2024-06-01 RX ADMIN — ONDANSETRON 4 MG: 2 INJECTION INTRAMUSCULAR; INTRAVENOUS at 02:33

## 2024-06-01 RX ADMIN — GABAPENTIN 100 MG: 100 CAPSULE ORAL at 15:38

## 2024-06-01 RX ADMIN — GABAPENTIN 100 MG: 100 CAPSULE ORAL at 21:11

## 2024-06-01 RX ADMIN — SODIUM CHLORIDE 3 G: 9 INJECTION, SOLUTION INTRAVENOUS at 21:12

## 2024-06-01 RX ADMIN — SODIUM CHLORIDE, SODIUM LACTATE, POTASSIUM CHLORIDE, AND CALCIUM CHLORIDE 100 ML/HR: .6; .31; .03; .02 INJECTION, SOLUTION INTRAVENOUS at 04:54

## 2024-06-01 RX ADMIN — SODIUM CHLORIDE, SODIUM LACTATE, POTASSIUM CHLORIDE, AND CALCIUM CHLORIDE 100 ML/HR: .6; .31; .03; .02 INJECTION, SOLUTION INTRAVENOUS at 10:53

## 2024-06-01 RX ADMIN — HYDROMORPHONE HYDROCHLORIDE 1 MG: 1 INJECTION, SOLUTION INTRAMUSCULAR; INTRAVENOUS; SUBCUTANEOUS at 10:07

## 2024-06-01 RX ADMIN — ACETAMINOPHEN 975 MG: 325 TABLET, FILM COATED ORAL at 13:59

## 2024-06-01 RX ADMIN — OXYCODONE HYDROCHLORIDE 10 MG: 10 TABLET ORAL at 13:58

## 2024-06-01 RX ADMIN — SODIUM CHLORIDE 3 G: 9 INJECTION, SOLUTION INTRAVENOUS at 15:38

## 2024-06-01 NOTE — PROGRESS NOTES
Progress Note - General Surgery  Diogenes Mullins 38 y.o. male MRN: [id number]  Unit/Bed#: [facility] 925-01 Encounter: [id number]      Assessment:  38 y.o. male ith perforated hepatic abscess status post 5/22 diagnostic laparoscopy converted to exploratory laparotomy with drainage of hepatic abscess, four-quadrant peritoneal lavage 2/2 purulent peritonitis and drain placements.   Persistent hepatic abscess s/p 5/26 IR drain placement within abscess cavity. EGD and colonoscopy on 5/30..IR drain repositioning on 5/31 with purulent output.    Febrile, tachycardia     WBC 21.4 from 15.5  Hgb 7.9 from 7.1  Cr 0.55 from 0.55    UOP: 625 cc recorded      Plan:  - Diet as tolerated  - LR @ 100 while poor oral intake  - PRN analgesia and antiemetics  - BID PPI  - Abdominal xray  - Continue drains. Monitor and record output  - ID consulted, appreciate recs. Continue unasyn. Monitor fever curve and WBC. Will discuss transitioning antibiotics if fevers and leukocytosis continues.  - Daily wound care to midline incision. Packed with 1/2in packing strips  - DVT ppx  - Encourage OOB, ambulation, IS        Subjective: Nausea and vomiting overnight. Febrile to 101.2. Tachycardia persists. Abdominal exam unchanged. Endorsing flatus and BM. Says nausea began after IR procedure yesterday.        Objective:   Temp:  [97.8 °F (36.6 °C)-101.2 °F (38.4 °C)] 99.4 °F (37.4 °C)  HR:  [104-122] 121  Resp:  [16-20] 16  BP: (113-138)/(67-87) 129/87  FiO2 (%):  [21] 21    Physical Exam:  General: No acute distress, alert and oriented  CV: Well perfused, regular rate and rhythm  Lungs: Normal work of breathing, no increased respiratory effort  Abdomen: Soft, tender, protuberant, no rebound or guarding. Incision with packing in place.  Extremities: No edema, clubbing or cyanosis  Skin: Warm, dry      I/O         05/30 0701  05/31 0700 05/31 0701  06/01 0700 06/01 0701  06/02 0700    P.O. 0 336     I.V. (mL/kg) 300 (2.4)      Total Intake(mL/kg) 300 (2.4)  336 (2.8)     Urine (mL/kg/hr) 1950 (0.6) 2475 (0.8) 250 (0.6)    Emesis/NG output  625     Drains 115 258     Stool  0     Total Output 2065 3358 250    Net -5545 -5157 -250           Unmeasured Urine Occurrence  1 x     Unmeasured Stool Occurrence  1 x             Lab, Imaging and other studies: I have personally reviewed pertinent reports.  , CBC with diff:   Lab Results   Component Value Date    WBC 21.40 (H) 06/01/2024    HGB 7.9 (L) 06/01/2024    HCT 26.1 (L) 06/01/2024    MCV 98 06/01/2024    PLT 1,088 (HH) 06/01/2024    RBC 2.67 (L) 06/01/2024    MCH 29.6 06/01/2024    MCHC 30.3 (L) 06/01/2024    RDW 16.7 (H) 06/01/2024    MPV 8.6 (L) 06/01/2024   , BMP/CMP:   Lab Results   Component Value Date    SODIUM 136 06/01/2024    K 4.3 06/01/2024     06/01/2024    CO2 24 06/01/2024    BUN 6 06/01/2024    CREATININE 0.55 (L) 06/01/2024    CALCIUM 7.7 (L) 06/01/2024    EGFR 131 06/01/2024           Td Stallworth MD  General Surgery   06/01/24

## 2024-06-01 NOTE — PLAN OF CARE
Problem: Prexisting or High Potential for Compromised Skin Integrity  Goal: Skin integrity is maintained or improved  Description: INTERVENTIONS:  - Identify patients at risk for skin breakdown  - Assess and monitor skin integrity  - Assess and monitor nutrition and hydration status  - Monitor labs   - Assess for incontinence   - Turn and reposition patient  - Assist with mobility/ambulation  - Relieve pressure over bony prominences  - Avoid friction and shearing  - Provide appropriate hygiene as needed including keeping skin clean and dry  - Evaluate need for skin moisturizer/barrier cream  - Collaborate with interdisciplinary team   - Patient/family teaching  - Consider wound care consult   Outcome: Progressing     Problem: PAIN - ADULT  Goal: Verbalizes/displays adequate comfort level or baseline comfort level  Description: Interventions:  - Encourage patient to monitor pain and request assistance  - Assess pain using appropriate pain scale  - Administer analgesics based on type and severity of pain and evaluate response  - Implement non-pharmacological measures as appropriate and evaluate response  - Consider cultural and social influences on pain and pain management  - Notify physician/advanced practitioner if interventions unsuccessful or patient reports new pain  Outcome: Progressing     Problem: INFECTION - ADULT  Goal: Absence or prevention of progression during hospitalization  Description: INTERVENTIONS:  - Assess and monitor for signs and symptoms of infection  - Monitor lab/diagnostic results  - Monitor all insertion sites, i.e. indwelling lines, tubes, and drains  - Monitor endotracheal if appropriate and nasal secretions for changes in amount and color  - [place] appropriate cooling/warming therapies per order  - Administer medications as ordered  - Instruct and encourage patient and family to use good hand hygiene technique  - Identify and instruct in appropriate isolation precautions for  identified infection/condition  Outcome: Progressing     Problem: SAFETY ADULT  Goal: Patient will remain free of falls  Description: INTERVENTIONS:  - Educate patient/family on patient safety including physical limitations  - Instruct patient to call for assistance with activity   - Consult OT/PT to assist with strengthening/mobility   - Keep Call bell within reach  - Keep bed low and locked with side rails adjusted as appropriate  - Keep care items and personal belongings within reach  - Initiate and maintain comfort rounds  - Make Fall Risk Sign visible to staff  - Offer Toileting every  Hours, in advance of need  - Initiate/Maintain alarm  - Obtain necessary fall risk management equipment:   - Apply yellow socks and bracelet for high fall risk patients  - Consider moving patient to room near nurses station  Outcome: Progressing  Goal: Maintain or return to baseline ADL function  Description: INTERVENTIONS:  -  Assess patient's ability to carry out ADLs; assess patient's baseline for ADL function and identify physical deficits which impact ability to perform ADLs (bathing, care of mouth/teeth, toileting, grooming, dressing, etc.)  - Assess/evaluate cause of self-care deficits   - Assess range of motion  - Assess patient's mobility; develop plan if impaired  - Assess patient's need for assistive devices and provide as appropriate  - Encourage maximum independence but intervene and supervise when necessary  - Involve family in performance of ADLs  - Assess for home care needs following discharge   - Consider OT consult to assist with ADL evaluation and planning for discharge  - Provide patient education as appropriate  Outcome: Progressing  Goal: Maintains/Returns to pre admission functional level  Description: INTERVENTIONS:  - Perform AM-PAC 6 Click Basic Mobility/ Daily Activity assessment daily.  - Set and communicate daily mobility goal to care team and patient/family/caregiver.   - Collaborate with  rehabilitation services on mobility goals if consulted  - Perform Range of Motion  times a day.  - Reposition patient every  hours.  - Dangle patient  times a day  - Stand patient  times a day  - Ambulate patient  times a day  - Out of bed to chair  times a day   - Out of bed for meals  times a day  - Out of bed for toileting  - Record patient progress and toleration of activity level   Outcome: Progressing     Problem: DISCHARGE PLANNING  Goal: Discharge to home or other facility with appropriate resources  Description: INTERVENTIONS:  - Identify barriers to discharge w/patient and caregiver  - Arrange for needed discharge resources and transportation as appropriate  - Identify discharge learning needs (meds, wound care, etc.)  - Arrange for interpretive services to assist at discharge as needed  - Refer to Case Management Department for coordinating discharge planning if the patient needs post-hospital services based on physician/advanced practitioner order or complex needs related to functional status, cognitive ability, or social support system  Outcome: Progressing     Problem: Nutrition/Hydration-ADULT  Goal: Nutrient/Hydration intake appropriate for improving, restoring or maintaining nutritional needs  Description: Monitor and assess patient's nutrition/hydration status for malnutrition. Collaborate with interdisciplinary team and initiate plan and interventions as ordered.  Monitor patient's weight and dietary intake as ordered or per policy. Utilize nutrition screening tool and intervene as necessary. Determine patient's food preferences and provide high-protein, high-caloric foods as appropriate.     INTERVENTIONS:  - Monitor oral intake, urinary output, labs, and treatment plans  - Assess nutrition and hydration status and recommend course of action  - Evaluate amount of meals eaten  - Assist patient with eating if necessary   - Allow adequate time for meals  - Recommend/ encourage appropriate diets,  oral nutritional supplements, and vitamin/mineral supplements  - Order, calculate, and assess calorie counts as needed  - Recommend, monitor, and adjust tube feedings and TPN/PPN based on assessed needs  - Assess need for intravenous fluids  - Provide specific nutrition/hydration education as appropriate  - Include patient/family/caregiver in decisions related to nutrition  Outcome: Progressing

## 2024-06-01 NOTE — PLAN OF CARE
Problem: Prexisting or High Potential for Compromised Skin Integrity  Goal: Skin integrity is maintained or improved  Description: INTERVENTIONS:  - Identify patients at risk for skin breakdown  - Assess and monitor skin integrity  - Assess and monitor nutrition and hydration status  - Monitor labs   - Assess for incontinence   - Turn and reposition patient  - Assist with mobility/ambulation  - Relieve pressure over bony prominences  - Avoid friction and shearing  - Provide appropriate hygiene as needed including keeping skin clean and dry  - Evaluate need for skin moisturizer/barrier cream  - Collaborate with interdisciplinary team   - Patient/family teaching  - Consider wound care consult   Outcome: Progressing     Problem: PAIN - ADULT  Goal: Verbalizes/displays adequate comfort level or baseline comfort level  Description: Interventions:  - Encourage patient to monitor pain and request assistance  - Assess pain using appropriate pain scale  - Administer analgesics based on type and severity of pain and evaluate response  - Implement non-pharmacological measures as appropriate and evaluate response  - Consider cultural and social influences on pain and pain management  - Notify physician/advanced practitioner if interventions unsuccessful or patient reports new pain  Outcome: Progressing     Problem: INFECTION - ADULT  Goal: Absence or prevention of progression during hospitalization  Description: INTERVENTIONS:  - Assess and monitor for signs and symptoms of infection  - Monitor lab/diagnostic results  - Monitor all insertion sites, i.e. indwelling lines, tubes, and drains  - Monitor endotracheal if appropriate and nasal secretions for changes in amount and color  - [place] appropriate cooling/warming therapies per order  - Administer medications as ordered  - Instruct and encourage patient and family to use good hand hygiene technique  - Identify and instruct in appropriate isolation precautions for  identified infection/condition  Outcome: Progressing  Goal: Absence of fever/infection during neutropenic period  Description: INTERVENTIONS:  - Monitor WBC    Outcome: Progressing     Problem: SAFETY ADULT  Goal: Patient will remain free of falls  Description: INTERVENTIONS:  - Educate patient/family on patient safety including physical limitations  - Instruct patient to call for assistance with activity   - Consult OT/PT to assist with strengthening/mobility   - Keep Call bell within reach  - Keep bed low and locked with side rails adjusted as appropriate  - Keep care items and personal belongings within reach  - Initiate and maintain comfort rounds  - Make Fall Risk Sign visible to staff  - Offer Toileting every 2 Hours, in advance of need  - Initiate/Maintain bed alarm  - Obtain necessary fall risk management equipment: yes    - Apply yellow socks and bracelet for high fall risk patients  - Consider moving patient to room near nurses station  Outcome: Progressing  Goal: Maintain or return to baseline ADL function  Description: INTERVENTIONS:  -  Assess patient's ability to carry out ADLs; assess patient's baseline for ADL function and identify physical deficits which impact ability to perform ADLs (bathing, care of mouth/teeth, toileting, grooming, dressing, etc.)  - Assess/evaluate cause of self-care deficits   - Assess range of motion  - Assess patient's mobility; develop plan if impaired  - Assess patient's need for assistive devices and provide as appropriate  - Encourage maximum independence but intervene and supervise when necessary  - Involve family in performance of ADLs  - Assess for home care needs following discharge   - Consider OT consult to assist with ADL evaluation and planning for discharge  - Provide patient education as appropriate  Outcome: Progressing  Goal: Maintains/Returns to pre admission functional level  Description: INTERVENTIONS:  - Perform AM-PAC 6 Click Basic Mobility/ Daily  Activity assessment daily.  - Set and communicate daily mobility goal to care team and patient/family/caregiver.   - Collaborate with rehabilitation services on mobility goals if consulted  - Perform Range of Motion 3 times a day.  - Reposition patient every 2 hours.  - Dangle patient 3 times a day  - Stand patient 3 times a day  - Ambulate patient 3 times a day  - Out of bed to chair 3 times a day   - Out of bed for meals 3 times a day  - Out of bed for toileting  - Record patient progress and toleration of activity level   Outcome: Progressing     Problem: DISCHARGE PLANNING  Goal: Discharge to home or other facility with appropriate resources  Description: INTERVENTIONS:  - Identify barriers to discharge w/patient and caregiver  - Arrange for needed discharge resources and transportation as appropriate  - Identify discharge learning needs (meds, wound care, etc.)  - Arrange for interpretive services to assist at discharge as needed  - Refer to Case Management Department for coordinating discharge planning if the patient needs post-hospital services based on physician/advanced practitioner order or complex needs related to functional status, cognitive ability, or social support system  Outcome: Progressing     Problem: Nutrition/Hydration-ADULT  Goal: Nutrient/Hydration intake appropriate for improving, restoring or maintaining nutritional needs  Description: Monitor and assess patient's nutrition/hydration status for malnutrition. Collaborate with interdisciplinary team and initiate plan and interventions as ordered.  Monitor patient's weight and dietary intake as ordered or per policy. Utilize nutrition screening tool and intervene as necessary. Determine patient's food preferences and provide high-protein, high-caloric foods as appropriate.     INTERVENTIONS:  - Monitor oral intake, urinary output, labs, and treatment plans  - Assess nutrition and hydration status and recommend course of action  - Evaluate  amount of meals eaten  - Assist patient with eating if necessary   - Allow adequate time for meals  - Recommend/ encourage appropriate diets, oral nutritional supplements, and vitamin/mineral supplements  - Order, calculate, and assess calorie counts as needed  - Recommend, monitor, and adjust tube feedings and TPN/PPN based on assessed needs  - Assess need for intravenous fluids  - Provide specific nutrition/hydration education as appropriate  - Include patient/family/caregiver in decisions related to nutrition  Outcome: Progressing

## 2024-06-02 LAB
ALBUMIN SERPL BCP-MCNC: 2.4 G/DL (ref 3.5–5)
ALP SERPL-CCNC: 89 U/L (ref 34–104)
ALT SERPL W P-5'-P-CCNC: 16 U/L (ref 7–52)
ANION GAP SERPL CALCULATED.3IONS-SCNC: 8 MMOL/L (ref 4–13)
AST SERPL W P-5'-P-CCNC: 19 U/L (ref 13–39)
BASOPHILS # BLD AUTO: 0.03 THOUSANDS/ÂΜL (ref 0–0.1)
BASOPHILS NFR BLD AUTO: 0 % (ref 0–1)
BILIRUB SERPL-MCNC: 0.51 MG/DL (ref 0.2–1)
BUN SERPL-MCNC: 9 MG/DL (ref 5–25)
CALCIUM ALBUM COR SERPL-MCNC: 8.9 MG/DL (ref 8.3–10.1)
CALCIUM SERPL-MCNC: 7.6 MG/DL (ref 8.4–10.2)
CHLORIDE SERPL-SCNC: 104 MMOL/L (ref 96–108)
CO2 SERPL-SCNC: 25 MMOL/L (ref 21–32)
CREAT SERPL-MCNC: 0.49 MG/DL (ref 0.6–1.3)
EOSINOPHIL # BLD AUTO: 0.05 THOUSAND/ÂΜL (ref 0–0.61)
EOSINOPHIL NFR BLD AUTO: 0 % (ref 0–6)
ERYTHROCYTE [DISTWIDTH] IN BLOOD BY AUTOMATED COUNT: 16.7 % (ref 11.6–15.1)
GFR SERPL CREATININE-BSD FRML MDRD: 137 ML/MIN/1.73SQ M
GLUCOSE SERPL-MCNC: 100 MG/DL (ref 65–140)
HCT VFR BLD AUTO: 24.3 % (ref 36.5–49.3)
HGB BLD-MCNC: 7.5 G/DL (ref 12–17)
IMM GRANULOCYTES # BLD AUTO: 0.11 THOUSAND/UL (ref 0–0.2)
IMM GRANULOCYTES NFR BLD AUTO: 1 % (ref 0–2)
LYMPHOCYTES # BLD AUTO: 1.24 THOUSANDS/ÂΜL (ref 0.6–4.47)
LYMPHOCYTES NFR BLD AUTO: 9 % (ref 14–44)
MCH RBC QN AUTO: 29.6 PG (ref 26.8–34.3)
MCHC RBC AUTO-ENTMCNC: 30.9 G/DL (ref 31.4–37.4)
MCV RBC AUTO: 96 FL (ref 82–98)
MONOCYTES # BLD AUTO: 1 THOUSAND/ÂΜL (ref 0.17–1.22)
MONOCYTES NFR BLD AUTO: 8 % (ref 4–12)
NEUTROPHILS # BLD AUTO: 10.89 THOUSANDS/ÂΜL (ref 1.85–7.62)
NEUTS SEG NFR BLD AUTO: 82 % (ref 43–75)
NRBC BLD AUTO-RTO: 0 /100 WBCS
PLATELET # BLD AUTO: 1027 THOUSANDS/UL (ref 149–390)
PMV BLD AUTO: 8.4 FL (ref 8.9–12.7)
POTASSIUM SERPL-SCNC: 3.9 MMOL/L (ref 3.5–5.3)
PROT SERPL-MCNC: 5.6 G/DL (ref 6.4–8.4)
RBC # BLD AUTO: 2.53 MILLION/UL (ref 3.88–5.62)
SODIUM SERPL-SCNC: 137 MMOL/L (ref 135–147)
WBC # BLD AUTO: 13.32 THOUSAND/UL (ref 4.31–10.16)

## 2024-06-02 PROCEDURE — 85025 COMPLETE CBC W/AUTO DIFF WBC: CPT

## 2024-06-02 PROCEDURE — 99024 POSTOP FOLLOW-UP VISIT: CPT | Performed by: SURGERY

## 2024-06-02 PROCEDURE — 80053 COMPREHEN METABOLIC PANEL: CPT

## 2024-06-02 RX ADMIN — HEPARIN SODIUM 5000 UNITS: 5000 INJECTION INTRAVENOUS; SUBCUTANEOUS at 20:03

## 2024-06-02 RX ADMIN — SODIUM CHLORIDE 3 G: 9 INJECTION, SOLUTION INTRAVENOUS at 10:19

## 2024-06-02 RX ADMIN — SODIUM CHLORIDE 3 G: 9 INJECTION, SOLUTION INTRAVENOUS at 20:04

## 2024-06-02 RX ADMIN — GABAPENTIN 100 MG: 100 CAPSULE ORAL at 20:04

## 2024-06-02 RX ADMIN — HYDROMORPHONE HYDROCHLORIDE 1 MG: 1 INJECTION, SOLUTION INTRAMUSCULAR; INTRAVENOUS; SUBCUTANEOUS at 20:03

## 2024-06-02 RX ADMIN — ASPIRIN 81 MG: 81 TABLET, COATED ORAL at 10:20

## 2024-06-02 RX ADMIN — ACETAMINOPHEN 975 MG: 325 TABLET, FILM COATED ORAL at 20:03

## 2024-06-02 RX ADMIN — ONDANSETRON 4 MG: 2 INJECTION INTRAMUSCULAR; INTRAVENOUS at 14:42

## 2024-06-02 RX ADMIN — OXYCODONE HYDROCHLORIDE 10 MG: 10 TABLET ORAL at 10:27

## 2024-06-02 RX ADMIN — SODIUM CHLORIDE 3 G: 9 INJECTION, SOLUTION INTRAVENOUS at 15:24

## 2024-06-02 RX ADMIN — OXYCODONE HYDROCHLORIDE 10 MG: 10 TABLET ORAL at 02:26

## 2024-06-02 RX ADMIN — METHOCARBAMOL 750 MG: 750 TABLET ORAL at 18:07

## 2024-06-02 RX ADMIN — HYDROMORPHONE HYDROCHLORIDE 1 MG: 1 INJECTION, SOLUTION INTRAMUSCULAR; INTRAVENOUS; SUBCUTANEOUS at 15:24

## 2024-06-02 RX ADMIN — GABAPENTIN 100 MG: 100 CAPSULE ORAL at 15:24

## 2024-06-02 RX ADMIN — METHOCARBAMOL 750 MG: 750 TABLET ORAL at 12:20

## 2024-06-02 RX ADMIN — METHOCARBAMOL 750 MG: 750 TABLET ORAL at 05:28

## 2024-06-02 RX ADMIN — CALCIUM CARBONATE (ANTACID) CHEW TAB 500 MG 500 MG: 500 CHEW TAB at 13:58

## 2024-06-02 RX ADMIN — ACETAMINOPHEN 975 MG: 325 TABLET, FILM COATED ORAL at 13:58

## 2024-06-02 RX ADMIN — HEPARIN SODIUM 5000 UNITS: 5000 INJECTION INTRAVENOUS; SUBCUTANEOUS at 13:58

## 2024-06-02 RX ADMIN — HEPARIN SODIUM 5000 UNITS: 5000 INJECTION INTRAVENOUS; SUBCUTANEOUS at 05:28

## 2024-06-02 RX ADMIN — GABAPENTIN 100 MG: 100 CAPSULE ORAL at 10:20

## 2024-06-02 RX ADMIN — HYDROMORPHONE HYDROCHLORIDE 1 MG: 1 INJECTION, SOLUTION INTRAMUSCULAR; INTRAVENOUS; SUBCUTANEOUS at 12:20

## 2024-06-02 RX ADMIN — PANTOPRAZOLE SODIUM 40 MG: 40 TABLET, DELAYED RELEASE ORAL at 06:37

## 2024-06-02 RX ADMIN — OXYCODONE HYDROCHLORIDE 10 MG: 10 TABLET ORAL at 14:42

## 2024-06-02 RX ADMIN — PANTOPRAZOLE SODIUM 40 MG: 40 TABLET, DELAYED RELEASE ORAL at 15:24

## 2024-06-02 RX ADMIN — SODIUM CHLORIDE 3 G: 9 INJECTION, SOLUTION INTRAVENOUS at 04:08

## 2024-06-02 RX ADMIN — ACETAMINOPHEN 975 MG: 325 TABLET, FILM COATED ORAL at 05:28

## 2024-06-02 RX ADMIN — OXYCODONE HYDROCHLORIDE 10 MG: 10 TABLET ORAL at 19:24

## 2024-06-02 NOTE — PLAN OF CARE
Problem: Prexisting or High Potential for Compromised Skin Integrity  Goal: Skin integrity is maintained or improved  Description: INTERVENTIONS:  - Identify patients at risk for skin breakdown  - Assess and monitor skin integrity  - Assess and monitor nutrition and hydration status  - Monitor labs   - Assess for incontinence   - Turn and reposition patient  - Assist with mobility/ambulation  - Relieve pressure over bony prominences  - Avoid friction and shearing  - Provide appropriate hygiene as needed including keeping skin clean and dry  - Evaluate need for skin moisturizer/barrier cream  - Collaborate with interdisciplinary team   - Patient/family teaching  - Consider wound care consult   Outcome: Progressing     Problem: PAIN - ADULT  Goal: Verbalizes/displays adequate comfort level or baseline comfort level  Description: Interventions:  - Encourage patient to monitor pain and request assistance  - Assess pain using appropriate pain scale  - Administer analgesics based on type and severity of pain and evaluate response  - Implement non-pharmacological measures as appropriate and evaluate response  - Consider cultural and social influences on pain and pain management  - Notify physician/advanced practitioner if interventions unsuccessful or patient reports new pain  Outcome: Progressing     Problem: INFECTION - ADULT  Goal: Absence or prevention of progression during hospitalization  Description: INTERVENTIONS:  - Assess and monitor for signs and symptoms of infection  - Monitor lab/diagnostic results  - Monitor all insertion sites, i.e. indwelling lines, tubes, and drains  - Monitor endotracheal if appropriate and nasal secretions for changes in amount and color  - [place] appropriate cooling/warming therapies per order  - Administer medications as ordered  - Instruct and encourage patient and family to use good hand hygiene technique  - Identify and instruct in appropriate isolation precautions for  identified infection/condition  Outcome: Progressing  Goal: Absence of fever/infection during neutropenic period  Description: INTERVENTIONS:  - Monitor WBC    Outcome: Progressing     Problem: SAFETY ADULT  Goal: Patient will remain free of falls  Description: INTERVENTIONS:  - Educate patient/family on patient safety including physical limitations  - Instruct patient to call for assistance with activity   - Consult OT/PT to assist with strengthening/mobility   - Keep Call bell within reach  - Keep bed low and locked with side rails adjusted as appropriate  - Keep care items and personal belongings within reach  - Initiate and maintain comfort rounds  - Make Fall Risk Sign visible to staff  - Offer Toileting every  Hours, in advance of need  - Initiate/Maintain alarm  - Obtain necessary fall risk management equipment:   - Apply yellow socks and bracelet for high fall risk patients  - Consider moving patient to room near nurses station  Outcome: Progressing  Goal: Maintain or return to baseline ADL function  Description: INTERVENTIONS:  -  Assess patient's ability to carry out ADLs; assess patient's baseline for ADL function and identify physical deficits which impact ability to perform ADLs (bathing, care of mouth/teeth, toileting, grooming, dressing, etc.)  - Assess/evaluate cause of self-care deficits   - Assess range of motion  - Assess patient's mobility; develop plan if impaired  - Assess patient's need for assistive devices and provide as appropriate  - Encourage maximum independence but intervene and supervise when necessary  - Involve family in performance of ADLs  - Assess for home care needs following discharge   - Consider OT consult to assist with ADL evaluation and planning for discharge  - Provide patient education as appropriate  Outcome: Progressing  Goal: Maintains/Returns to pre admission functional level  Description: INTERVENTIONS:  - Perform AM-PAC 6 Click Basic Mobility/ Daily Activity  assessment daily.  - Set and communicate daily mobility goal to care team and patient/family/caregiver.   - Collaborate with rehabilitation services on mobility goals if consulted  - Perform Range of Motion  times a day.  - Reposition patient every  hours.  - Dangle patient  times a day  - Stand patient  times a day  - Ambulate patient  times a day  - Out of bed to chair  times a day   - Out of bed for meals  times a day  - Out of bed for toileting  - Record patient progress and toleration of activity level   Outcome: Progressing     Problem: DISCHARGE PLANNING  Goal: Discharge to home or other facility with appropriate resources  Description: INTERVENTIONS:  - Identify barriers to discharge w/patient and caregiver  - Arrange for needed discharge resources and transportation as appropriate  - Identify discharge learning needs (meds, wound care, etc.)  - Arrange for interpretive services to assist at discharge as needed  - Refer to Case Management Department for coordinating discharge planning if the patient needs post-hospital services based on physician/advanced practitioner order or complex needs related to functional status, cognitive ability, or social support system  Outcome: Progressing     Problem: Nutrition/Hydration-ADULT  Goal: Nutrient/Hydration intake appropriate for improving, restoring or maintaining nutritional needs  Description: Monitor and assess patient's nutrition/hydration status for malnutrition. Collaborate with interdisciplinary team and initiate plan and interventions as ordered.  Monitor patient's weight and dietary intake as ordered or per policy. Utilize nutrition screening tool and intervene as necessary. Determine patient's food preferences and provide high-protein, high-caloric foods as appropriate.     INTERVENTIONS:  - Monitor oral intake, urinary output, labs, and treatment plans  - Assess nutrition and hydration status and recommend course of action  - Evaluate amount of meals  eaten  - Assist patient with eating if necessary   - Allow adequate time for meals  - Recommend/ encourage appropriate diets, oral nutritional supplements, and vitamin/mineral supplements  - Order, calculate, and assess calorie counts as needed  - Recommend, monitor, and adjust tube feedings and TPN/PPN based on assessed needs  - Assess need for intravenous fluids  - Provide specific nutrition/hydration education as appropriate  - Include patient/family/caregiver in decisions related to nutrition  Outcome: Progressing

## 2024-06-02 NOTE — PROGRESS NOTES
Progress Note - General Surgery  Diogenes Mullins 38 y.o. male MRN: [id number]  Unit/Bed#: [facility] 925-01 Encounter: [id number]      Assessment:  38 y.o. male with perforated hepatic abscess status post 5/22 diagnostic laparoscopy converted to exploratory laparotomy with drainage of hepatic abscess, four-quadrant peritoneal lavage 2/2 purulent peritonitis and drain placements.   Persistent hepatic abscess s/p 5/26 IR drain placement within abscess cavity. EGD and colonoscopy on 5/30..IR drain repositioning on 5/31 with purulent output.     Afebrile, tachycardia on room air.    WBC pending from 21  Hgb pending from 7.9  Cr pending from 0.55    UOP: 1545 cc  RLQ drain: 15 cc  RUQ drain: 5 cc  IR drain: 40 cc  LLQ drain: 45 cc  LUQ drain: 3 cc      Plan:  - Diet as tolerated  - PRN analgesia and antiemetics  - BID PPI  - Continue ASA 81  - Continue drains. Monitor and record output  - ID consulted, appreciate recs. Continue unasyn. Monitor fever curve and WBC.  - Daily wound care to midline incision. Packed with 1/2in packing strips  - DVT ppx with SQH  - Encourage OOB, ambulation, IS        Subjective: No acute events overnight. Afebrile, hemodynamically stable. Tolerating liquid diet. Nausea improved, no vomiting, fevers or chills. Passing flatus and having bowel movements.      Objective:   Temp:  [97.9 °F (36.6 °C)-99.4 °F (37.4 °C)] 98.1 °F (36.7 °C)  HR:  [109-121] 109  Resp:  [16-18] 18  BP: (121-129)/(77-87) 121/77  FiO2 (%):  [21] 21    Physical Exam:  General: No acute distress, alert and oriented  CV: Well perfused, regular rate and rhythm  Lungs: Normal work of breathing, no increased respiratory effort  Abdomen: Soft, protuberant, mildly tender, no rebound or guarding, multiple drains in place  Extremities: No edema, clubbing or cyanosis  Skin: Warm, dry      I/O         05/31 0701  06/01 0700 06/01 0701  06/02 0700 06/02 0701  06/03 0700    P.O. 336 1310     I.V. (mL/kg)       Total Intake(mL/kg) 336 (2.8)  1310 (10.7)     Urine (mL/kg/hr) 2475 (0.8) 1545 (0.5)     Emesis/NG output 625      Drains 258 108 68    Stool 0      Total Output 3358 1653 68    Net -3022 -343 -68           Unmeasured Urine Occurrence 1 x 1 x     Unmeasured Stool Occurrence 1 x              Lab, Imaging and other studies: I have personally reviewed pertinent reports.  , CBC with diff:   Lab Results   Component Value Date    WBC 21.40 (H) 06/01/2024    HGB 7.9 (L) 06/01/2024    HCT 26.1 (L) 06/01/2024    MCV 98 06/01/2024    PLT 1,136 (HH) 06/01/2024    RBC 2.67 (L) 06/01/2024    MCH 29.6 06/01/2024    MCHC 30.3 (L) 06/01/2024    RDW 16.7 (H) 06/01/2024    MPV 8.4 (L) 06/01/2024   , BMP/CMP:   Lab Results   Component Value Date    SODIUM 136 06/01/2024    K 4.3 06/01/2024     06/01/2024    CO2 24 06/01/2024    BUN 6 06/01/2024    CREATININE 0.55 (L) 06/01/2024    CALCIUM 7.7 (L) 06/01/2024    EGFR 131 06/01/2024       Td Stallworth MD  General Surgery   06/02/24

## 2024-06-03 LAB
ANION GAP SERPL CALCULATED.3IONS-SCNC: 10 MMOL/L (ref 4–13)
BASOPHILS # BLD AUTO: 0.02 THOUSANDS/ÂΜL (ref 0–0.1)
BASOPHILS NFR BLD AUTO: 0 % (ref 0–1)
BUN SERPL-MCNC: 7 MG/DL (ref 5–25)
CALCIUM SERPL-MCNC: 7.4 MG/DL (ref 8.4–10.2)
CHLORIDE SERPL-SCNC: 101 MMOL/L (ref 96–108)
CO2 SERPL-SCNC: 24 MMOL/L (ref 21–32)
CREAT SERPL-MCNC: 0.47 MG/DL (ref 0.6–1.3)
EOSINOPHIL # BLD AUTO: 0.11 THOUSAND/ÂΜL (ref 0–0.61)
EOSINOPHIL NFR BLD AUTO: 1 % (ref 0–6)
ERYTHROCYTE [DISTWIDTH] IN BLOOD BY AUTOMATED COUNT: 16.5 % (ref 11.6–15.1)
FUNGUS SPEC CULT: NORMAL
GFR SERPL CREATININE-BSD FRML MDRD: 140 ML/MIN/1.73SQ M
GLUCOSE SERPL-MCNC: 87 MG/DL (ref 65–140)
HCT VFR BLD AUTO: 24.6 % (ref 36.5–49.3)
HGB BLD-MCNC: 7.6 G/DL (ref 12–17)
IMM GRANULOCYTES # BLD AUTO: 0.11 THOUSAND/UL (ref 0–0.2)
IMM GRANULOCYTES NFR BLD AUTO: 1 % (ref 0–2)
LYMPHOCYTES # BLD AUTO: 1.5 THOUSANDS/ÂΜL (ref 0.6–4.47)
LYMPHOCYTES NFR BLD AUTO: 13 % (ref 14–44)
MAGNESIUM SERPL-MCNC: 1.8 MG/DL (ref 1.9–2.7)
MCH RBC QN AUTO: 29.8 PG (ref 26.8–34.3)
MCHC RBC AUTO-ENTMCNC: 30.9 G/DL (ref 31.4–37.4)
MCV RBC AUTO: 97 FL (ref 82–98)
MONOCYTES # BLD AUTO: 1.12 THOUSAND/ÂΜL (ref 0.17–1.22)
MONOCYTES NFR BLD AUTO: 10 % (ref 4–12)
NEUTROPHILS # BLD AUTO: 8.51 THOUSANDS/ÂΜL (ref 1.85–7.62)
NEUTS SEG NFR BLD AUTO: 75 % (ref 43–75)
NRBC BLD AUTO-RTO: 0 /100 WBCS
PHOSPHATE SERPL-MCNC: 3.6 MG/DL (ref 2.7–4.5)
PLATELET # BLD AUTO: 992 THOUSANDS/UL (ref 149–390)
PMV BLD AUTO: 8.4 FL (ref 8.9–12.7)
POTASSIUM SERPL-SCNC: 3.9 MMOL/L (ref 3.5–5.3)
RBC # BLD AUTO: 2.55 MILLION/UL (ref 3.88–5.62)
SODIUM SERPL-SCNC: 135 MMOL/L (ref 135–147)
WBC # BLD AUTO: 11.37 THOUSAND/UL (ref 4.31–10.16)

## 2024-06-03 PROCEDURE — 84100 ASSAY OF PHOSPHORUS: CPT

## 2024-06-03 PROCEDURE — 83735 ASSAY OF MAGNESIUM: CPT

## 2024-06-03 PROCEDURE — 85025 COMPLETE CBC W/AUTO DIFF WBC: CPT

## 2024-06-03 PROCEDURE — 99232 SBSQ HOSP IP/OBS MODERATE 35: CPT | Performed by: INTERNAL MEDICINE

## 2024-06-03 PROCEDURE — 99024 POSTOP FOLLOW-UP VISIT: CPT | Performed by: STUDENT IN AN ORGANIZED HEALTH CARE EDUCATION/TRAINING PROGRAM

## 2024-06-03 PROCEDURE — 80048 BASIC METABOLIC PNL TOTAL CA: CPT

## 2024-06-03 RX ORDER — MAGNESIUM SULFATE HEPTAHYDRATE 40 MG/ML
2 INJECTION, SOLUTION INTRAVENOUS ONCE
Status: COMPLETED | OUTPATIENT
Start: 2024-06-03 | End: 2024-06-04

## 2024-06-03 RX ORDER — FAMOTIDINE 20 MG/1
20 TABLET, FILM COATED ORAL 2 TIMES DAILY
Status: DISCONTINUED | OUTPATIENT
Start: 2024-06-03 | End: 2024-06-05 | Stop reason: HOSPADM

## 2024-06-03 RX ADMIN — OXYCODONE HYDROCHLORIDE 10 MG: 10 TABLET ORAL at 20:58

## 2024-06-03 RX ADMIN — OXYCODONE HYDROCHLORIDE 10 MG: 10 TABLET ORAL at 10:06

## 2024-06-03 RX ADMIN — SCOPOLAMINE 1 PATCH: 1.5 PATCH, EXTENDED RELEASE TRANSDERMAL at 05:31

## 2024-06-03 RX ADMIN — OXYCODONE HYDROCHLORIDE 10 MG: 10 TABLET ORAL at 14:22

## 2024-06-03 RX ADMIN — PANTOPRAZOLE SODIUM 40 MG: 40 TABLET, DELAYED RELEASE ORAL at 05:30

## 2024-06-03 RX ADMIN — OXYCODONE HYDROCHLORIDE 10 MG: 10 TABLET ORAL at 05:35

## 2024-06-03 RX ADMIN — SODIUM CHLORIDE 3 G: 9 INJECTION, SOLUTION INTRAVENOUS at 21:08

## 2024-06-03 RX ADMIN — METHOCARBAMOL 750 MG: 750 TABLET ORAL at 00:44

## 2024-06-03 RX ADMIN — HYDROMORPHONE HYDROCHLORIDE 1 MG: 1 INJECTION, SOLUTION INTRAMUSCULAR; INTRAVENOUS; SUBCUTANEOUS at 18:14

## 2024-06-03 RX ADMIN — SODIUM CHLORIDE 3 G: 9 INJECTION, SOLUTION INTRAVENOUS at 10:00

## 2024-06-03 RX ADMIN — ACETAMINOPHEN 975 MG: 325 TABLET, FILM COATED ORAL at 05:30

## 2024-06-03 RX ADMIN — GABAPENTIN 100 MG: 100 CAPSULE ORAL at 08:47

## 2024-06-03 RX ADMIN — ONDANSETRON 4 MG: 2 INJECTION INTRAMUSCULAR; INTRAVENOUS at 18:20

## 2024-06-03 RX ADMIN — METHOCARBAMOL 750 MG: 750 TABLET ORAL at 11:35

## 2024-06-03 RX ADMIN — ACETAMINOPHEN 975 MG: 325 TABLET, FILM COATED ORAL at 21:00

## 2024-06-03 RX ADMIN — OXYCODONE HYDROCHLORIDE 10 MG: 10 TABLET ORAL at 00:43

## 2024-06-03 RX ADMIN — SODIUM CHLORIDE 3 G: 9 INJECTION, SOLUTION INTRAVENOUS at 03:05

## 2024-06-03 RX ADMIN — GABAPENTIN 100 MG: 100 CAPSULE ORAL at 15:47

## 2024-06-03 RX ADMIN — ACETAMINOPHEN 975 MG: 325 TABLET, FILM COATED ORAL at 14:18

## 2024-06-03 RX ADMIN — METHOCARBAMOL 750 MG: 750 TABLET ORAL at 23:30

## 2024-06-03 RX ADMIN — HYDROMORPHONE HYDROCHLORIDE 1 MG: 1 INJECTION, SOLUTION INTRAMUSCULAR; INTRAVENOUS; SUBCUTANEOUS at 12:32

## 2024-06-03 RX ADMIN — ASPIRIN 81 MG: 81 TABLET, COATED ORAL at 08:47

## 2024-06-03 RX ADMIN — MAGNESIUM SULFATE HEPTAHYDRATE 2 G: 40 INJECTION, SOLUTION INTRAVENOUS at 07:38

## 2024-06-03 RX ADMIN — CALCIUM CARBONATE (ANTACID) CHEW TAB 500 MG 500 MG: 500 CHEW TAB at 10:57

## 2024-06-03 RX ADMIN — PANTOPRAZOLE SODIUM 40 MG: 40 TABLET, DELAYED RELEASE ORAL at 15:47

## 2024-06-03 RX ADMIN — GABAPENTIN 100 MG: 100 CAPSULE ORAL at 20:59

## 2024-06-03 RX ADMIN — HYDROMORPHONE HYDROCHLORIDE 1 MG: 1 INJECTION, SOLUTION INTRAMUSCULAR; INTRAVENOUS; SUBCUTANEOUS at 03:07

## 2024-06-03 RX ADMIN — HEPARIN SODIUM 5000 UNITS: 5000 INJECTION INTRAVENOUS; SUBCUTANEOUS at 05:30

## 2024-06-03 RX ADMIN — FAMOTIDINE 20 MG: 20 TABLET, FILM COATED ORAL at 17:23

## 2024-06-03 RX ADMIN — METHOCARBAMOL 750 MG: 750 TABLET ORAL at 17:23

## 2024-06-03 RX ADMIN — SODIUM CHLORIDE 3 G: 9 INJECTION, SOLUTION INTRAVENOUS at 15:47

## 2024-06-03 RX ADMIN — METHOCARBAMOL 750 MG: 750 TABLET ORAL at 05:30

## 2024-06-03 RX ADMIN — ONDANSETRON 4 MG: 2 INJECTION INTRAMUSCULAR; INTRAVENOUS at 07:47

## 2024-06-03 RX ADMIN — HEPARIN SODIUM 5000 UNITS: 5000 INJECTION INTRAVENOUS; SUBCUTANEOUS at 21:11

## 2024-06-03 RX ADMIN — HEPARIN SODIUM 5000 UNITS: 5000 INJECTION INTRAVENOUS; SUBCUTANEOUS at 14:18

## 2024-06-03 NOTE — PROGRESS NOTES
Progress Note - General Surgery  Diogenes Mullins 38 y.o. male MRN: [id number]  Unit/Bed#: [facility] 925-01 Encounter: [id number]      Assessment:  38 y.o. male with perforated hepatic abscess status post 5/22 diagnostic laparoscopy converted to exploratory laparotomy with drainage of hepatic abscess, four-quadrant peritoneal lavage 2/2 purulent peritonitis and drain placements.   Persistent hepatic abscess s/p 5/26 IR drain placement within abscess cavity. EGD and colonoscopy on 5/30..IR drain repositioning on 5/31 with purulent output.     Afebrile, tachycardia, on room air.     WBC 11.3 from 13  Hgb 7.6 from 7.5  Cr 0.47 from 0.49  5/26 Cx: Dialister pneumosintes, prevotella intermedia,  Beta hemolytic strep Group F      Plan:  - Diet as tolerated  - PRN analgesia and antiemetics  - BID PPI  - Continue ASA 81  - Continue drains. Monitor and record output. LUQ drain removed 6/2. Remove LLQ drain today.  - Repeat CT AP prior to discharge.  - ID consulted, appreciate recs. Continue unasyn. Monitor fever curve and WBC. Will discuss final antibiotics recommendations for discharge.  - Daily wound care to midline incision. Packed with 1/2in packing strips  - DVT ppx with SQH  - Encourage OOB, ambulation, IS  - CM for disposition planning, hopefully early this week.    Subjective: No acute events overnight. Afebrile, hemodynamically stable. Tolerating diet. No nausea, or vomiting, fevers or chills.  Sleeping well. Having bowel movements and flatus. Ambulating the halls.      Objective:   Temp:  [97.9 °F (36.6 °C)-99.4 °F (37.4 °C)] 98.9 °F (37.2 °C)  HR:  [] 107  Resp:  [16-20] 20  BP: (107-123)/(57-75) 123/75    Physical Exam:  General: No acute distress, alert and oriented  CV: Well perfused, regular rate and rhythm  Lungs: Normal work of breathing, no increased respiratory effort  Abdomen: Soft, appropriately-tender, non-distended. Incision(s) clean, dry and intact. Multiple SYMONE drains with SS output with debris. IR  drain with purulent output.  Extremities: No clubbing or cyanosis  Skin: Warm, dry      I/O         06/01 0701  06/02 0700 06/02 0701  06/03 0700 06/03 0701 06/04 0700    P.O. 1310 960 380    IV Piggyback  200     Total Intake(mL/kg) 1310 (11.1) 1160 (9.8) 380 (3.2)    Urine (mL/kg/hr) 1545 (0.5) 0 (0)     Emesis/NG output  0     Drains 108 136     Stool  0     Total Output 1653 136     Net -343 +1024 +380           Unmeasured Urine Occurrence 1 x 5 x     Unmeasured Stool Occurrence  0 x             Lab, Imaging and other studies: I have personally reviewed pertinent reports.  , CBC with diff:   Lab Results   Component Value Date    WBC 11.37 (H) 06/03/2024    HGB 7.6 (L) 06/03/2024    HCT 24.6 (L) 06/03/2024    MCV 97 06/03/2024     (H) 06/03/2024    RBC 2.55 (L) 06/03/2024    MCH 29.8 06/03/2024    MCHC 30.9 (L) 06/03/2024    RDW 16.5 (H) 06/03/2024    MPV 8.4 (L) 06/03/2024    NRBC 0 06/03/2024   , BMP/CMP:   Lab Results   Component Value Date    SODIUM 135 06/03/2024    K 3.9 06/03/2024     06/03/2024    CO2 24 06/03/2024    BUN 7 06/03/2024    CREATININE 0.47 (L) 06/03/2024    CALCIUM 7.4 (L) 06/03/2024    EGFR 140 06/03/2024       Td Stallworth MD  General Surgery   06/03/24

## 2024-06-03 NOTE — QUICK NOTE
06/02/24    Procedure: LUQ Drain Removal    Left upper quadrant surgical drain removed in routine fashion without incident. The patient tolerated the procedure well. A dry, sterile dressing was placed.       Td Stallworth MD  General Surgery   06/02/24

## 2024-06-03 NOTE — PLAN OF CARE
Problem: Prexisting or High Potential for Compromised Skin Integrity  Goal: Skin integrity is maintained or improved  Description: INTERVENTIONS:  - Identify patients at risk for skin breakdown  - Assess and monitor skin integrity  - Assess and monitor nutrition and hydration status  - Monitor labs   - Assess for incontinence   - Turn and reposition patient  - Assist with mobility/ambulation  - Relieve pressure over bony prominences  - Avoid friction and shearing  - Provide appropriate hygiene as needed including keeping skin clean and dry  - Evaluate need for skin moisturizer/barrier cream  - Collaborate with interdisciplinary team   - Patient/family teaching  - Consider wound care consult   Outcome: Progressing     Problem: PAIN - ADULT  Goal: Verbalizes/displays adequate comfort level or baseline comfort level  Description: Interventions:  - Encourage patient to monitor pain and request assistance  - Assess pain using appropriate pain scale  - Administer analgesics based on type and severity of pain and evaluate response  - Implement non-pharmacological measures as appropriate and evaluate response  - Consider cultural and social influences on pain and pain management  - Notify physician/advanced practitioner if interventions unsuccessful or patient reports new pain  Outcome: Progressing     Problem: INFECTION - ADULT  Goal: Absence or prevention of progression during hospitalization  Description: INTERVENTIONS:  - Assess and monitor for signs and symptoms of infection  - Monitor lab/diagnostic results  - Monitor all insertion sites, i.e. indwelling lines, tubes, and drains  - Monitor endotracheal if appropriate and nasal secretions for changes in amount and color  - [place] appropriate cooling/warming therapies per order  - Administer medications as ordered  - Instruct and encourage patient and family to use good hand hygiene technique  - Identify and instruct in appropriate isolation precautions for  identified infection/condition  Outcome: Progressing  Goal: Absence of fever/infection during neutropenic period  Description: INTERVENTIONS:  - Monitor WBC    Outcome: Progressing     Problem: SAFETY ADULT  Goal: Patient will remain free of falls  Description: INTERVENTIONS:  - Educate patient/family on patient safety including physical limitations  - Instruct patient to call for assistance with activity   - Consult OT/PT to assist with strengthening/mobility   - Keep Call bell within reach  - Keep bed low and locked with side rails adjusted as appropriate  - Keep care items and personal belongings within reach  - Initiate and maintain comfort rounds  - Make Fall Risk Sign visible to staff  - Offer Toileting every  Hours, in advance of need  - Initiate/Maintain alarm  - Obtain necessary fall risk management equipment:   - Apply yellow socks and bracelet for high fall risk patients  - Consider moving patient to room near nurses station  Outcome: Progressing  Goal: Maintain or return to baseline ADL function  Description: INTERVENTIONS:  -  Assess patient's ability to carry out ADLs; assess patient's baseline for ADL function and identify physical deficits which impact ability to perform ADLs (bathing, care of mouth/teeth, toileting, grooming, dressing, etc.)  - Assess/evaluate cause of self-care deficits   - Assess range of motion  - Assess patient's mobility; develop plan if impaired  - Assess patient's need for assistive devices and provide as appropriate  - Encourage maximum independence but intervene and supervise when necessary  - Involve family in performance of ADLs  - Assess for home care needs following discharge   - Consider OT consult to assist with ADL evaluation and planning for discharge  - Provide patient education as appropriate  Outcome: Progressing  Goal: Maintains/Returns to pre admission functional level  Description: INTERVENTIONS:  - Perform AM-PAC 6 Click Basic Mobility/ Daily Activity  assessment daily.  - Set and communicate daily mobility goal to care team and patient/family/caregiver.   - Collaborate with rehabilitation services on mobility goals if consulted  - Perform Range of Motion  times a day.  - Reposition patient every  hours.  - Dangle patient  times a day  - Stand patient  times a day  - Ambulate patient  times a day  - Out of bed to chair  times a day   - Out of bed for meals  times a day  - Out of bed for toileting  - Record patient progress and toleration of activity level   Outcome: Progressing     Problem: DISCHARGE PLANNING  Goal: Discharge to home or other facility with appropriate resources  Description: INTERVENTIONS:  - Identify barriers to discharge w/patient and caregiver  - Arrange for needed discharge resources and transportation as appropriate  - Identify discharge learning needs (meds, wound care, etc.)  - Arrange for interpretive services to assist at discharge as needed  - Refer to Case Management Department for coordinating discharge planning if the patient needs post-hospital services based on physician/advanced practitioner order or complex needs related to functional status, cognitive ability, or social support system  Outcome: Progressing     Problem: Nutrition/Hydration-ADULT  Goal: Nutrient/Hydration intake appropriate for improving, restoring or maintaining nutritional needs  Description: Monitor and assess patient's nutrition/hydration status for malnutrition. Collaborate with interdisciplinary team and initiate plan and interventions as ordered.  Monitor patient's weight and dietary intake as ordered or per policy. Utilize nutrition screening tool and intervene as necessary. Determine patient's food preferences and provide high-protein, high-caloric foods as appropriate.     INTERVENTIONS:  - Monitor oral intake, urinary output, labs, and treatment plans  - Assess nutrition and hydration status and recommend course of action  - Evaluate amount of meals  eaten  - Assist patient with eating if necessary   - Allow adequate time for meals  - Recommend/ encourage appropriate diets, oral nutritional supplements, and vitamin/mineral supplements  - Order, calculate, and assess calorie counts as needed  - Recommend, monitor, and adjust tube feedings and TPN/PPN based on assessed needs  - Assess need for intravenous fluids  - Provide specific nutrition/hydration education as appropriate  - Include patient/family/caregiver in decisions related to nutrition  Outcome: Progressing

## 2024-06-03 NOTE — PROGRESS NOTES
Progress Note - Infectious Disease   Diogenes Mullins 38 y.o. male MRN: [id number]  [facility] Encounter: [id number]      Impression/Recommendations:  1.  Severe sepsis.  Tachycardia, tachypnea, leukocytosis, elevated lactic acid.  Secondary to #2.  Blood cultures are negative.  WBC count continues to trend down, now down to 11.     -Antibiotic plan as below  -Follow temperatures and hemodynamics  -Check CBC in AM to assess treatment response     2.  Perforated liver abscess with purulent peritonitis.  Unclear etiology, risk factors. Consider biliary anatomic abnormality versus intraluminal GI lesion.  Abdominal CT with no other acute abnormalities including evidence of malignancy. RUQ US with no biliary abnormalities.  No FH of GI malignancy.  Status post exploratory laparotomy with drainage of abscess and four-quadrant peritoneal lavage 5/22. Intraoperative findings included extensive four-quadrant purulent peritonitis with near frozen abdomen due to inflammation.  OR cultures with Group F Strep (unusual biliary pathogen), prevotella.  Pathology negative for malignancy.  Repeat CT showed persistent abscess.  Status post IR drain 5/26 yielding 270 cc pus.  Culture again with growth of group F strep and anaerobes.  EGD with single ulcer in stomach and colonoscopy showed 1 polyp in the ascending colon as well as edematous mucosa in cecum.  Biopsies performed.  Most recent CT with improving hepatic collections, status post drain repositioning 5/31.     -Continue Unasyn for now  -Ongoing drain management  -Surgery follow-up ongoing  -Serial abdominal exams  -Likely plan for repeat CT later in the week prior to possible discharge.  -Eventual plan to transition to oral antibiotic to continue until radiologic resolution of intra-abdominal collections.     3.  Acute respiratory insufficiency.  Now remains stable on room air.  Most recent CT does show possibility of left subpulmonic collection.  This area was  evaluated by IR with no subpulmonic collection seen via ultrasound.     -Follow O2 requirements.  -Volume management per surgery service     4.  Acute kidney injury.  In the setting of severe sepsis.  No acute  findings on CT.  Creatinine has improved.     5.  Elevated LFTs.  In the setting of #1/#2.  No radiologic evidence of biliary obstruction.  LFTs have improved.     I discussed above plan with patient and his mother at bedside and answered all questions.     Antibiotics:  Unasyn  Antibiotics 13  POD12              Subjective:  Patient is feeling better today.  He underwent IR drain repositioning over the weekend.  No fevers.  Tolerating oral intake.    Objective:  Vitals:  Temp:  [97.9 °F (36.6 °C)-99.4 °F (37.4 °C)] 98.9 °F (37.2 °C)  HR:  [] 107  Resp:  [16-20] 20  BP: (107-123)/(57-75) 123/75  SpO2:  [96 %-98 %] 96 %  Temp (24hrs), Av.7 °F (37.1 °C), Min:97.9 °F (36.6 °C), Max:99.4 °F (37.4 °C)  Current: Temperature: 98.9 °F (37.2 °C)    Physical Exam:   General:  No acute distress, sitting comfortably in chair  HEENT atraumatic normocephalic  Neck trachea midline  Psychiatric:  Awake and alert  Pulmonary:  Normal respiratory excursion without accessory muscle use  Abdomen: Nondistended with no rigidity or guarding, dressing intact, drains in place.  Extremities: Symmetric edema  Skin:  No rashes  Neuro moves all extremities spontaneously    Lab Results:  I have personally reviewed pertinent labs.  Results from last 7 days   Lab Units 24  0527 24  0830 24  0912 24  0459 24  0358   POTASSIUM mmol/L 3.9 3.9 4.3   < > 4.1   CHLORIDE mmol/L 101 104 103   < > 99   CO2 mmol/L 24 25 24   < > 27   BUN mg/dL 7 9 6   < > 10   CREATININE mg/dL 0.47* 0.49* 0.55*   < > 0.48*   EGFR ml/min/1.73sq m 140 137 131   < > 138   CALCIUM mg/dL 7.4* 7.6* 7.7*   < > 6.9*   AST U/L  --  19  --   --  37   ALT U/L  --  16  --   --  45   ALK PHOS U/L  --  89  --   --  117*    < > = values in  this interval not displayed.     Results from last 7 days   Lab Units 06/03/24  0527 06/02/24  0830 06/01/24 2015 06/01/24  0912   WBC Thousand/uL 11.37* 13.32*  --  21.40*   HEMOGLOBIN g/dL 7.6* 7.5*  --  7.9*   PLATELETS Thousands/uL 992* 1,027* 1,136* 1,088*           Imaging Studies:   I have personally reviewed pertinent imaging study reports and images in PACS.    EKG, Pathology, and Other Studies:   I have personally reviewed pertinent reports.    IR procedure report reviewed.

## 2024-06-03 NOTE — PLAN OF CARE
Problem: PAIN - ADULT  Goal: Verbalizes/displays adequate comfort level or baseline comfort level  Description: Interventions:  - Encourage patient to monitor pain and request assistance  - Assess pain using appropriate pain scale  - Administer analgesics based on type and severity of pain and evaluate response  - Implement non-pharmacological measures as appropriate and evaluate response  - Consider cultural and social influences on pain and pain management  - Notify physician/advanced practitioner if interventions unsuccessful or patient reports new pain  Outcome: Progressing     Problem: INFECTION - ADULT  Goal: Absence or prevention of progression during hospitalization  Description: INTERVENTIONS:  - Assess and monitor for signs and symptoms of infection  - Monitor lab/diagnostic results  - Monitor all insertion sites, i.e. indwelling lines, tubes, and drains  - Monitor endotracheal if appropriate and nasal secretions for changes in amount and color  - Lerona appropriate cooling/warming therapies per order  - Administer medications as ordered  - Instruct and encourage patient and family to use good hand hygiene technique  - Identify and instruct in appropriate isolation precautions for identified infection/condition  Outcome: Progressing

## 2024-06-03 NOTE — QUICK NOTE
Patient seen and evaluated for LLQ SYMONE drain removal. Patients LLQ SYMONE drain removed at bedside. Area covered with 4 x 4 gauze and secured with tape. Patient tolerated the removal well.

## 2024-06-04 ENCOUNTER — APPOINTMENT (INPATIENT)
Dept: RADIOLOGY | Facility: HOSPITAL | Age: 39
DRG: 710 | End: 2024-06-04
Payer: COMMERCIAL

## 2024-06-04 LAB
ANION GAP SERPL CALCULATED.3IONS-SCNC: 12 MMOL/L (ref 4–13)
BASOPHILS # BLD AUTO: 0.03 THOUSANDS/ÂΜL (ref 0–0.1)
BASOPHILS NFR BLD AUTO: 0 % (ref 0–1)
BUN SERPL-MCNC: 8 MG/DL (ref 5–25)
CALCIUM SERPL-MCNC: 7.5 MG/DL (ref 8.4–10.2)
CHLORIDE SERPL-SCNC: 101 MMOL/L (ref 96–108)
CO2 SERPL-SCNC: 23 MMOL/L (ref 21–32)
CREAT SERPL-MCNC: 0.52 MG/DL (ref 0.6–1.3)
EOSINOPHIL # BLD AUTO: 0.07 THOUSAND/ÂΜL (ref 0–0.61)
EOSINOPHIL NFR BLD AUTO: 1 % (ref 0–6)
ERYTHROCYTE [DISTWIDTH] IN BLOOD BY AUTOMATED COUNT: 16.3 % (ref 11.6–15.1)
GFR SERPL CREATININE-BSD FRML MDRD: 134 ML/MIN/1.73SQ M
GLUCOSE SERPL-MCNC: 84 MG/DL (ref 65–140)
HCT VFR BLD AUTO: 25.4 % (ref 36.5–49.3)
HGB BLD-MCNC: 7.9 G/DL (ref 12–17)
IMM GRANULOCYTES # BLD AUTO: 0.11 THOUSAND/UL (ref 0–0.2)
IMM GRANULOCYTES NFR BLD AUTO: 1 % (ref 0–2)
LYMPHOCYTES # BLD AUTO: 1.48 THOUSANDS/ÂΜL (ref 0.6–4.47)
LYMPHOCYTES NFR BLD AUTO: 15 % (ref 14–44)
MCH RBC QN AUTO: 29.8 PG (ref 26.8–34.3)
MCHC RBC AUTO-ENTMCNC: 31.1 G/DL (ref 31.4–37.4)
MCV RBC AUTO: 96 FL (ref 82–98)
MONOCYTES # BLD AUTO: 1.06 THOUSAND/ÂΜL (ref 0.17–1.22)
MONOCYTES NFR BLD AUTO: 10 % (ref 4–12)
NEUTROPHILS # BLD AUTO: 7.49 THOUSANDS/ÂΜL (ref 1.85–7.62)
NEUTS SEG NFR BLD AUTO: 73 % (ref 43–75)
NRBC BLD AUTO-RTO: 0 /100 WBCS
PLATELET # BLD AUTO: 942 THOUSANDS/UL (ref 149–390)
PMV BLD AUTO: 8.2 FL (ref 8.9–12.7)
POTASSIUM SERPL-SCNC: 3.7 MMOL/L (ref 3.5–5.3)
RBC # BLD AUTO: 2.65 MILLION/UL (ref 3.88–5.62)
SODIUM SERPL-SCNC: 136 MMOL/L (ref 135–147)
WBC # BLD AUTO: 10.24 THOUSAND/UL (ref 4.31–10.16)

## 2024-06-04 PROCEDURE — 85025 COMPLETE CBC W/AUTO DIFF WBC: CPT | Performed by: SURGERY

## 2024-06-04 PROCEDURE — 99024 POSTOP FOLLOW-UP VISIT: CPT | Performed by: STUDENT IN AN ORGANIZED HEALTH CARE EDUCATION/TRAINING PROGRAM

## 2024-06-04 PROCEDURE — 80048 BASIC METABOLIC PNL TOTAL CA: CPT | Performed by: SURGERY

## 2024-06-04 PROCEDURE — 88313 SPECIAL STAINS GROUP 2: CPT | Performed by: PATHOLOGY

## 2024-06-04 PROCEDURE — 74177 CT ABD & PELVIS W/CONTRAST: CPT

## 2024-06-04 PROCEDURE — 88341 IMHCHEM/IMCYTCHM EA ADD ANTB: CPT | Performed by: PATHOLOGY

## 2024-06-04 PROCEDURE — 99232 SBSQ HOSP IP/OBS MODERATE 35: CPT | Performed by: INTERNAL MEDICINE

## 2024-06-04 PROCEDURE — 88342 IMHCHEM/IMCYTCHM 1ST ANTB: CPT | Performed by: PATHOLOGY

## 2024-06-04 PROCEDURE — 88305 TISSUE EXAM BY PATHOLOGIST: CPT | Performed by: PATHOLOGY

## 2024-06-04 RX ORDER — POTASSIUM CHLORIDE 20 MEQ/1
40 TABLET, EXTENDED RELEASE ORAL ONCE
Status: COMPLETED | OUTPATIENT
Start: 2024-06-04 | End: 2024-06-04

## 2024-06-04 RX ADMIN — HYDROMORPHONE HYDROCHLORIDE 1 MG: 1 INJECTION, SOLUTION INTRAMUSCULAR; INTRAVENOUS; SUBCUTANEOUS at 16:03

## 2024-06-04 RX ADMIN — PANTOPRAZOLE SODIUM 40 MG: 40 TABLET, DELAYED RELEASE ORAL at 16:03

## 2024-06-04 RX ADMIN — METHOCARBAMOL 750 MG: 750 TABLET ORAL at 11:01

## 2024-06-04 RX ADMIN — OXYCODONE HYDROCHLORIDE 10 MG: 10 TABLET ORAL at 05:42

## 2024-06-04 RX ADMIN — HYDROMORPHONE HYDROCHLORIDE 1 MG: 1 INJECTION, SOLUTION INTRAMUSCULAR; INTRAVENOUS; SUBCUTANEOUS at 10:57

## 2024-06-04 RX ADMIN — OXYCODONE HYDROCHLORIDE 10 MG: 10 TABLET ORAL at 18:31

## 2024-06-04 RX ADMIN — HEPARIN SODIUM 5000 UNITS: 5000 INJECTION INTRAVENOUS; SUBCUTANEOUS at 21:55

## 2024-06-04 RX ADMIN — ACETAMINOPHEN 975 MG: 325 TABLET, FILM COATED ORAL at 05:42

## 2024-06-04 RX ADMIN — POTASSIUM CHLORIDE 40 MEQ: 1500 TABLET, EXTENDED RELEASE ORAL at 13:04

## 2024-06-04 RX ADMIN — CALCIUM CARBONATE (ANTACID) CHEW TAB 500 MG 500 MG: 500 CHEW TAB at 18:31

## 2024-06-04 RX ADMIN — METHOCARBAMOL 750 MG: 750 TABLET ORAL at 18:30

## 2024-06-04 RX ADMIN — OXYCODONE HYDROCHLORIDE 10 MG: 10 TABLET ORAL at 23:38

## 2024-06-04 RX ADMIN — HEPARIN SODIUM 5000 UNITS: 5000 INJECTION INTRAVENOUS; SUBCUTANEOUS at 05:42

## 2024-06-04 RX ADMIN — IOHEXOL 85 ML: 350 INJECTION, SOLUTION INTRAVENOUS at 07:04

## 2024-06-04 RX ADMIN — ACETAMINOPHEN 975 MG: 325 TABLET, FILM COATED ORAL at 21:54

## 2024-06-04 RX ADMIN — ACETAMINOPHEN 975 MG: 325 TABLET, FILM COATED ORAL at 13:04

## 2024-06-04 RX ADMIN — ONDANSETRON 4 MG: 2 INJECTION INTRAMUSCULAR; INTRAVENOUS at 09:09

## 2024-06-04 RX ADMIN — SODIUM CHLORIDE 3 G: 9 INJECTION, SOLUTION INTRAVENOUS at 09:09

## 2024-06-04 RX ADMIN — SODIUM CHLORIDE 3 G: 9 INJECTION, SOLUTION INTRAVENOUS at 21:55

## 2024-06-04 RX ADMIN — ASPIRIN 81 MG: 81 TABLET, COATED ORAL at 09:01

## 2024-06-04 RX ADMIN — SODIUM CHLORIDE 3 G: 9 INJECTION, SOLUTION INTRAVENOUS at 16:03

## 2024-06-04 RX ADMIN — HEPARIN SODIUM 5000 UNITS: 5000 INJECTION INTRAVENOUS; SUBCUTANEOUS at 13:04

## 2024-06-04 RX ADMIN — FAMOTIDINE 20 MG: 20 TABLET, FILM COATED ORAL at 09:01

## 2024-06-04 RX ADMIN — GABAPENTIN 100 MG: 100 CAPSULE ORAL at 21:54

## 2024-06-04 RX ADMIN — METHOCARBAMOL 750 MG: 750 TABLET ORAL at 05:42

## 2024-06-04 RX ADMIN — SODIUM CHLORIDE 3 G: 9 INJECTION, SOLUTION INTRAVENOUS at 03:19

## 2024-06-04 RX ADMIN — PANTOPRAZOLE SODIUM 40 MG: 40 TABLET, DELAYED RELEASE ORAL at 06:44

## 2024-06-04 RX ADMIN — GABAPENTIN 100 MG: 100 CAPSULE ORAL at 16:03

## 2024-06-04 RX ADMIN — GABAPENTIN 100 MG: 100 CAPSULE ORAL at 09:01

## 2024-06-04 RX ADMIN — METHOCARBAMOL 750 MG: 750 TABLET ORAL at 23:37

## 2024-06-04 RX ADMIN — FAMOTIDINE 20 MG: 20 TABLET, FILM COATED ORAL at 18:31

## 2024-06-04 RX ADMIN — OXYCODONE HYDROCHLORIDE 10 MG: 10 TABLET ORAL at 13:04

## 2024-06-04 RX ADMIN — HYDROMORPHONE HYDROCHLORIDE 1 MG: 1 INJECTION, SOLUTION INTRAMUSCULAR; INTRAVENOUS; SUBCUTANEOUS at 06:44

## 2024-06-04 NOTE — PROGRESS NOTES
Progress Note - Infectious Disease   Diogenes Mullins 38 y.o. male MRN: [id number]  [facility] Encounter: [id number]    Impression/Recommendations:  1.  Severe sepsis.  Tachycardia, tachypnea, leukocytosis, elevated lactic acid.  Secondary to #2.  Blood cultures are negative.  WBC count continues to trend down, now down to 10 today.     -Antibiotic plan as below  -Follow temperatures and hemodynamics  -Check CBC in AM to assess treatment response     2.  Perforated liver abscess with purulent peritonitis.  Unclear etiology, risk factors. Consider biliary anatomic abnormality versus intraluminal GI lesion.  Abdominal CT with no other acute abnormalities including evidence of malignancy. RUQ US with no biliary abnormalities.  No FH of GI malignancy.  Status post exploratory laparotomy with drainage of abscess and four-quadrant peritoneal lavage 5/22. Intraoperative findings included extensive four-quadrant purulent peritonitis with near frozen abdomen due to inflammation.  OR cultures with Group F Strep (unusual biliary pathogen), prevotella.  Pathology negative for malignancy.  Repeat CT showed persistent abscess.  Status post IR drain 5/26 yielding 270 cc pus.  Culture again with growth of group F strep and anaerobes.  EGD with single ulcer in stomach and colonoscopy showed 1 polyp in the ascending colon as well as edematous mucosa in cecum.  Pathology was negative.  Most recent CT with improving hepatic collections.     -Continue Unasyn for now  -Ongoing drain management  -Surgery follow-up ongoing  -Serial abdominal exams  -Eventual plan to transition to oral Augmentin on discharge to continue until radiologic resolution.     3.  Acute respiratory insufficiency.  Now remains stable on room air.  Most recent CT does show possibility of left subpulmonic collection.  This area was evaluated by IR with no subpulmonic collection seen via ultrasound.     -Follow O2 requirements.  -Volume management per surgery  service     4.  Acute kidney injury.  In the setting of severe sepsis.  No acute  findings on CT.  Creatinine has improved.     5.  Elevated LFTs.  In the setting of #1/#2.  No radiologic evidence of biliary obstruction.  LFTs have improved.     I discussed above plan with patient and his mother at bedside and answered all questions.     Antibiotics:  Unasyn  Antibiotics 14  POD13               Subjective:  Drain was removed earlier today.  Patient underwent CT.  No fevers or chills.  He still has epigastric fullness he is keeping down some food.    Objective:  Vitals:  Temp:  [98.5 °F (36.9 °C)-100.1 °F (37.8 °C)] 98.5 °F (36.9 °C)  HR:  [100-114] 100  Resp:  [14-18] 17  BP: (108-118)/(65-78) 109/65  SpO2:  [95 %-97 %] 97 %  Temp (24hrs), Av.3 °F (37.4 °C), Min:98.5 °F (36.9 °C), Max:100.1 °F (37.8 °C)  Current: Temperature: 98.5 °F (36.9 °C)    Physical Exam:   General:  No acute distress, nontoxic  HEENT atraumatic normocephalic  Neck trachea midline  Psychiatric:  Awake and alert  Pulmonary:  Normal respiratory excursion without accessory muscle use  Abdomen: Midline incision with packing in place.  No surrounding erythema or visible purulence.  Drains in place  Extremities: Improving edema  Skin:  No rashes  Neuro moves all extremities spontaneously    Lab Results:  I have personally reviewed pertinent labs.  Results from last 7 days   Lab Units 24  0924  0524  0830   POTASSIUM mmol/L 3.7 3.9 3.9   CHLORIDE mmol/L 101 101 104   CO2 mmol/L 23 24 25   BUN mg/dL 8 7 9   CREATININE mg/dL 0.52* 0.47* 0.49*   EGFR ml/min/1.73sq m 134 140 137   CALCIUM mg/dL 7.5* 7.4* 7.6*   AST U/L  --   --  19   ALT U/L  --   --  16   ALK PHOS U/L  --   --  89     Results from last 7 days   Lab Units 24  0911 24  0524  0830   WBC Thousand/uL 10.24* 11.37* 13.32*   HEMOGLOBIN g/dL 7.9* 7.6* 7.5*   PLATELETS Thousands/uL 942* 992* 1,027*           Imaging Studies:   I have personally  reviewed pertinent imaging study reports and images in PACS.    CT abdomen/pelvis, personally reviewed, with slight interval decrease in size of hepatic abscess.  Interval resolution of subcapsular collection.  Proximal small bowel dilatation again identified.    EKG, Pathology, and Other Studies:   I have personally reviewed pertinent reports.

## 2024-06-04 NOTE — PROGRESS NOTES
"Progress Note - General Surgery   Diogenes Mullins 38 y.o. male MRN: [id number]  Unit/Bed#: [facility] 925-01 Encounter: [id number]    Assessment:  Diogenes Mullins is a 38 y.o. male patient with a perforated hepatic abscess status post diagnostic laparoscopy converted to exploratory laparotomy with subsequent drainage of hepatic abscess, and four quadrant peritoneal lavage for purulent peritonitis on 5/22. Patient has a persistent hepatic abscess following IR drain placement on 5/26.     Plan:  - Diet as tolerated  - PRN analgesia and antiemetics  - BID PPI  - Continue ASA 81  - Continue monitoring I/Os  - Repeat CT AP before discharge  - Daily wound care for midline incision  - DVT ppx with SQH  - OOB    Subjective/Objective   Chief Complaint: \"Bowel prep didn't agree with me.\"    Subjective: Nausea and vomiting yesterday and overnight. Patient believes it is due to bowel prep regimen he took prior to colonoscopy. 100.1 temperature at 2100, but states he purposely wore multiple blankets to induce vomiting, which alleviated nausea. No other issues overnight. Afebrile. Hemodynamically stable. Having multiple bowel movements in a day.     Objective:   Blood pressure 118/78, pulse (!) 114, temperature 100.1 °F (37.8 °C), resp. rate 14, height 5' 10\" (1.778 m), weight 118 kg (261 lb 0.4 oz), SpO2 95%.,Body mass index is 37.45 kg/m².      Intake/Output Summary (Last 24 hours) at 6/4/2024 0525  Last data filed at 6/3/2024 2333  Gross per 24 hour   Intake 1060 ml   Output 355 ml   Net 705 ml   Small amount of sero sanguinis drainage from right sided drain.    Invasive Devices       Peripheral Intravenous Line  Duration             Peripheral IV 06/03/24 Left;Ventral (anterior) Forearm <1 day              Drain  Duration             Closed/Suction Drain LLQ Bulb 19 Fr. 12 days    Closed/Suction Drain Right RUQ Bulb 19 Fr. 12 days    Abscess Drain Back 8 days    Abscess Drain RLQ 3 days                  Physical Exam: Abdomen: " "abnormal findings:  distended, mild tenderness generally, and was non-rigid.    Lab, Imaging and other studies:I have personally reviewed pertinent lab results.  , CBC: No results found for: \"WBC\", \"HGB\", \"HCT\", \"MCV\", \"PLT\", \"ADJUSTEDWBC\", \"RBC\", \"MCH\", \"MCHC\", \"RDW\", \"MPV\", \"NRBC\", CMP: No results found for: \"SODIUM\", \"K\", \"CL\", \"CO2\", \"ANIONGAP\", \"BUN\", \"CREATININE\", \"GLUCOSE\", \"CALCIUM\", \"AST\", \"ALT\", \"ALKPHOS\", \"PROT\", \"BILITOT\", \"EGFR\"  VTE Pharmacologic Prophylaxis: [facility]  VTE Mechanical Prophylaxis: sequential compression device  "

## 2024-06-04 NOTE — QUICK NOTE
Patient seen and evaluated at bedside for RUQ Suprahepatic SYMONE drain removal. Patient was given pain medications prior. RUQ suprahepatic SYMONE drain was removed. Area covered with 4 x 4 gauze and secured with tape. Patient tolerated the removal well and there were no complications.

## 2024-06-05 VITALS
TEMPERATURE: 97.3 F | SYSTOLIC BLOOD PRESSURE: 112 MMHG | HEIGHT: 70 IN | RESPIRATION RATE: 18 BRPM | WEIGHT: 251.54 LBS | OXYGEN SATURATION: 97 % | BODY MASS INDEX: 36.01 KG/M2 | DIASTOLIC BLOOD PRESSURE: 63 MMHG | HEART RATE: 98 BPM

## 2024-06-05 LAB
ANION GAP SERPL CALCULATED.3IONS-SCNC: 13 MMOL/L (ref 4–13)
BASOPHILS # BLD AUTO: 0.04 THOUSANDS/ÂΜL (ref 0–0.1)
BASOPHILS NFR BLD AUTO: 0 % (ref 0–1)
BUN SERPL-MCNC: 7 MG/DL (ref 5–25)
CALCIUM SERPL-MCNC: 7.4 MG/DL (ref 8.4–10.2)
CHLORIDE SERPL-SCNC: 102 MMOL/L (ref 96–108)
CO2 SERPL-SCNC: 22 MMOL/L (ref 21–32)
CREAT SERPL-MCNC: 0.46 MG/DL (ref 0.6–1.3)
EOSINOPHIL # BLD AUTO: 0.06 THOUSAND/ÂΜL (ref 0–0.61)
EOSINOPHIL NFR BLD AUTO: 1 % (ref 0–6)
ERYTHROCYTE [DISTWIDTH] IN BLOOD BY AUTOMATED COUNT: 16.5 % (ref 11.6–15.1)
GFR SERPL CREATININE-BSD FRML MDRD: 141 ML/MIN/1.73SQ M
GLUCOSE SERPL-MCNC: 85 MG/DL (ref 65–140)
HCT VFR BLD AUTO: 26.7 % (ref 36.5–49.3)
HGB BLD-MCNC: 8.1 G/DL (ref 12–17)
IMM GRANULOCYTES # BLD AUTO: 0.1 THOUSAND/UL (ref 0–0.2)
IMM GRANULOCYTES NFR BLD AUTO: 1 % (ref 0–2)
LYMPHOCYTES # BLD AUTO: 1.56 THOUSANDS/ÂΜL (ref 0.6–4.47)
LYMPHOCYTES NFR BLD AUTO: 15 % (ref 14–44)
MCH RBC QN AUTO: 29.6 PG (ref 26.8–34.3)
MCHC RBC AUTO-ENTMCNC: 30.3 G/DL (ref 31.4–37.4)
MCV RBC AUTO: 97 FL (ref 82–98)
MONOCYTES # BLD AUTO: 1.09 THOUSAND/ÂΜL (ref 0.17–1.22)
MONOCYTES NFR BLD AUTO: 10 % (ref 4–12)
NEUTROPHILS # BLD AUTO: 7.75 THOUSANDS/ÂΜL (ref 1.85–7.62)
NEUTS SEG NFR BLD AUTO: 73 % (ref 43–75)
NRBC BLD AUTO-RTO: 0 /100 WBCS
PLATELET # BLD AUTO: 926 THOUSANDS/UL (ref 149–390)
PMV BLD AUTO: 8.3 FL (ref 8.9–12.7)
POTASSIUM SERPL-SCNC: 4 MMOL/L (ref 3.5–5.3)
RBC # BLD AUTO: 2.74 MILLION/UL (ref 3.88–5.62)
SODIUM SERPL-SCNC: 137 MMOL/L (ref 135–147)
WBC # BLD AUTO: 10.6 THOUSAND/UL (ref 4.31–10.16)

## 2024-06-05 PROCEDURE — 80048 BASIC METABOLIC PNL TOTAL CA: CPT | Performed by: SURGERY

## 2024-06-05 PROCEDURE — 85025 COMPLETE CBC W/AUTO DIFF WBC: CPT | Performed by: SURGERY

## 2024-06-05 PROCEDURE — 99024 POSTOP FOLLOW-UP VISIT: CPT

## 2024-06-05 PROCEDURE — 99233 SBSQ HOSP IP/OBS HIGH 50: CPT | Performed by: INTERNAL MEDICINE

## 2024-06-05 PROCEDURE — NC001 PR NO CHARGE: Performed by: STUDENT IN AN ORGANIZED HEALTH CARE EDUCATION/TRAINING PROGRAM

## 2024-06-05 RX ORDER — AMOXICILLIN AND CLAVULANATE POTASSIUM 875; 125 MG/1; MG/1
1 TABLET, FILM COATED ORAL EVERY 12 HOURS SCHEDULED
Qty: 42 TABLET | Refills: 0 | Status: SHIPPED | OUTPATIENT
Start: 2024-06-05 | End: 2024-06-26

## 2024-06-05 RX ORDER — PANTOPRAZOLE SODIUM 40 MG/1
40 TABLET, DELAYED RELEASE ORAL
Qty: 60 TABLET | Refills: 0 | Status: SHIPPED | OUTPATIENT
Start: 2024-06-05 | End: 2024-07-05

## 2024-06-05 RX ORDER — GABAPENTIN 100 MG/1
100 CAPSULE ORAL 3 TIMES DAILY
Qty: 21 CAPSULE | Refills: 0 | Status: SHIPPED | OUTPATIENT
Start: 2024-06-05 | End: 2024-06-12

## 2024-06-05 RX ORDER — METHOCARBAMOL 750 MG/1
750 TABLET, FILM COATED ORAL EVERY 6 HOURS SCHEDULED
Qty: 28 TABLET | Refills: 0 | Status: SHIPPED | OUTPATIENT
Start: 2024-06-05 | End: 2024-06-12

## 2024-06-05 RX ORDER — OXYCODONE HYDROCHLORIDE 5 MG/1
5 TABLET ORAL EVERY 4 HOURS PRN
Qty: 12 TABLET | Refills: 0 | Status: SHIPPED | OUTPATIENT
Start: 2024-06-05 | End: 2024-06-07

## 2024-06-05 RX ADMIN — METHOCARBAMOL 750 MG: 750 TABLET ORAL at 05:25

## 2024-06-05 RX ADMIN — SODIUM CHLORIDE 3 G: 9 INJECTION, SOLUTION INTRAVENOUS at 09:01

## 2024-06-05 RX ADMIN — OXYCODONE HYDROCHLORIDE 10 MG: 10 TABLET ORAL at 11:53

## 2024-06-05 RX ADMIN — PANTOPRAZOLE SODIUM 40 MG: 40 TABLET, DELAYED RELEASE ORAL at 06:17

## 2024-06-05 RX ADMIN — FAMOTIDINE 20 MG: 20 TABLET, FILM COATED ORAL at 07:24

## 2024-06-05 RX ADMIN — GABAPENTIN 100 MG: 100 CAPSULE ORAL at 07:24

## 2024-06-05 RX ADMIN — CALCIUM CARBONATE (ANTACID) CHEW TAB 500 MG 500 MG: 500 CHEW TAB at 07:24

## 2024-06-05 RX ADMIN — HEPARIN SODIUM 5000 UNITS: 5000 INJECTION INTRAVENOUS; SUBCUTANEOUS at 05:25

## 2024-06-05 RX ADMIN — SODIUM CHLORIDE 3 G: 9 INJECTION, SOLUTION INTRAVENOUS at 03:51

## 2024-06-05 RX ADMIN — ACETAMINOPHEN 975 MG: 325 TABLET, FILM COATED ORAL at 05:25

## 2024-06-05 RX ADMIN — ASPIRIN 81 MG: 81 TABLET, COATED ORAL at 07:24

## 2024-06-05 RX ADMIN — METHOCARBAMOL 750 MG: 750 TABLET ORAL at 11:51

## 2024-06-05 RX ADMIN — ONDANSETRON 4 MG: 2 INJECTION INTRAMUSCULAR; INTRAVENOUS at 07:30

## 2024-06-05 RX ADMIN — ACETAMINOPHEN 975 MG: 325 TABLET, FILM COATED ORAL at 15:13

## 2024-06-05 RX ADMIN — OXYCODONE HYDROCHLORIDE 10 MG: 10 TABLET ORAL at 05:25

## 2024-06-05 NOTE — PROGRESS NOTES
Patient is refusing bed/chair alarm. This RN educated the patient on fall precautions and that he is a high fall risk. Patient continues to refuse.

## 2024-06-05 NOTE — DISCHARGE SUMMARY
"  Discharge Summary - Diogenes Mullins 38 y.o. male MRN: [id number]    Unit/Bed#: [facility] 925-01 Encounter: [id number]    Admission Date:   Admission Orders (From admission, onward)       Ordered        05/22/24 0657  Inpatient Admission  Once                            Admitting Diagnosis: Liver abscess    HPI: As per resident Dr. Rojo \" Diogenes Mullins is a 38 y.o. male with no apparent past medical history, who presents with 5 days of abdominal pain.  Patient states that approximately 5 days ago he ate a beef meal, resulting in some mild abdominal discomfort.  Symptoms were associated with profuse nausea, vomiting, diarrhea.  Approximately 2 days later he began having intense RUQ abdominal pain with worsening nausea, as well as chest pain, diaphoresis, dyspnea.  He presented to the ED yesterday, with CT findings concerning for a perforated liver abscess containing fluid and gas measuring 9.3 x 2.1 x 7.4 cm in the right hepatic lobe.  He was admitted to the ICU due to concerns of sepsis including tachycardia, tachypnea, SHAYY with creatinine of 2.8, WBC of 23, downtrending lactate of 2.2, and T. bili of 4.3.  On exam his abdomen is mild to moderately tender in the RUQ.  Patient denies any abdominal symptoms prior to 5 days ago, and states that he is in good health.  Denies any sick contacts, recent travel, or ingestion of raw foods.\"    Procedures Performed: No orders of the defined types were placed in this encounter.      Summary of Hospital Course:   Patient is a 38 year old M with no significant PMH who presented as a transfer from [facility] with a perforated hepatic abscess. Patient had a CT done that showed an abscess measuring 9 cm in the longest dimension. Patient was admitted under general surgery for further treatment. Patient was started on broad spectrum antibiotics. A RUQ ultrasound was ordered to rule out any possible gallbladder pathology. Upon admission patient was noted to be " tachycardic/tachypneic and was taking to the OR for washout and drainage of hepatic abscess. Later in the day on 5/22 patient was taken to the OR with Dr. Clifton for diagnostic laparoscopy converted to exploratory laparotomy, drainage of hepatic abscess, four quadrant peritoneal lavage, and placement of four 19Fr jl drains (RUQ: suprahepatic, RLQ: Leiva's pouch, LLQ: pelvis, LUQ: Splenic recess). Postoperatively patient was maintained in the PACU before being transferred to the ICU where he received his care immediately post operatively. Patient remained intubated with plan for extubation later in the day on 5/22 vs tomorrow 5/23. Infectious disease was consulted for further recommendations and recommended to continue with empiric vancomycin/cefepime/flagyl and to follow up operative cultures and tailor antibiotics accordingly. Patient was extubated on 5/23/2024. On 5/24/2024 patient was transferred out of the MICU to medical surgical floor where he received the remainder of his care. Throughout his hospital stay his diet was advanced. CLD was started on 5/25. On 5/25 patients OR cultures noted to be growing Group F strep and Prevotella. Antibiotics were narrowed to Unasyn per ID recommendations. On 5/26 patient was noted to have low grade fevers with T max of 100.7 and WBC remained elevated. Patient also noted to be dyspneic with wheezing requiring 1L NC. CXR was completed which was significant for low lung volumes without pleural effusions. A repeat CTAP was completed on 5/26 which was significant for 9.6 x 6.9 cm hepatic abscess within the right liver again identified. IR was consulted for enlargement of hepatic abscess. IR recommended a percutaneous drain placement to optimize drainage. On 5/26 patient had a 14Fr drain placed into abscess cavity with IR. On 5/28 patients diet was advanced to a regular diet. Continuing with Unasyn as antibiotics, and continuing with as needed nebulized Xopenex treatments.  On 5/29 GI was consulted for further evaluation for possible bacteremia or cause of proceeding resulting in the liver abscess. GI recommended an inpatient EGD and colonoscopy. On 5/30 patient underwent EGD and colonoscopy with our GI colleagues. EGD significant for Edematous, nodular mucosa in the cecum; performed cold forceps biopsy. Subcentimeter polyp in the ascending colon was removed with cold snare. Colonoscopy was significant for The esophagus appeared normal. Erythematous mucosa with erosion and exudate in the lesser curve of the stomach Single ulcer in the greater curve of the stomach with clean base (Alfred III); performed cold forceps biopsy Performed forceps biopsies in the greater curve of the stomach, lesser curve of the stomach, incisura and antrum to rule out H. pylori The duodenal bulb, 1st part of the duodenum and 2nd part of the duodenum appeared normal. Performed random biopsy to rule out eosinophilic esophagitis. Patient was started on BID PPI per GI recommendations. On 5/31 patient underwent another CT AP to evaluated for any undrained collections. CTAP 5/31 significant for 1. Increased size of the moderate bilateral pleural effusions and bibasilar passive atelectasis. 2.  No significant change in the possible left subpulmonic empyema. 3.  Increased dilation of the small bowel with possible transition point may represent a small bowel obstruction or ileus. 4.  Decreased size of the hepatic abscess following drainage catheter placement. 5.  Decrease size of the subcapsular hepatic collection. Later in the day on 5/31 patient underwent IR drainage check and the IR drain was exchanged and repositioned into a larger abscess pocket. 55mL of pus was aspirated. On 6/2/2024 patients LUQ surgical drain was removed at bedside with no complications. On 6/3 patients LLQ SYMONE drain was removed at bedside without any complications. On 6/4 patients RUQ Suprahepatic SYMONE drain was removed at bedside without any  complications. ID recommended to transition to PO Augmentin on discharge until radiologic resolution of the hepatic abscess. On 6/5 patients RLQ subhepatic SYMONE drain was removed at bedside without any complications. Throughout his hospital stay his midline wound continued to be packed with plain packing daily.    On date of discharge, 6/5/2024, patient was deemed appropriate for discharge. Patients pain was well controlled. Patient was tolerating a diet. Patient was voiding spontaneously. Patient had bowel function. Patient was ambulatory. Patient was discharged into the care of his family on 6/5/2024.    Patient was instructed to follow up with the General Surgery Clinic on Monday 6/10. Patient instructed to continue with 1 inch plain packing to midline incision daily. Patient instructed to monitor IR drain output daily, and to call IR office when drain output was less than 10cc for two consecutive days. Patient instructed to continue taking Augmentin. Patient instructed to follow up with ID after outpatient repeat CT scan is performed.     Significant Findings, Care, Treatment and Services Provided: IR drainage tube placement    Result Date: 5/29/2024  Impression: Successful placement of 14 [place] locking pigtail drainage catheter into the complex intrahepatic abscess. A total of 270 mL purulent fluid was aspirated, fully decompressing the abscess. Workstation performed: [id number]     CT chest abdomen pelvis w contrast    Result Date: 5/26/2024  Impression: 9.6 x 6.9 cm hepatic abscess within the right lobe of the liver again identified. Decreasing internal gas is noted however overall size is increased from prior exam. Interval increase in associated overlying subcapsular collection. Bibasilar atelectasis and trace right pleural effusion. Workstation performed: [id number]     XR chest portable    Result Date: 5/26/2024  Impression: Low lung volumes with bibasilar atelectasis. Pneumonia not excluded in the  appropriate clinical setting. Workstation performed: [id number]     XR chest portable ICU    Result Date: 5/23/2024  Impression: Enteric tube should be advanced 5 to 10 cm. Other tubes and lines as above. Right basilar atelectasis and left lower lobe atelectasis versus infiltrate. Trace bilateral pleural effusions better seen on same-day CT. The study was marked in EPIC for immediate notification. Resident: Rey Vega I, the attending radiologist, have reviewed the images and agree with the final report above. Workstation performed: [id number]     US right upper quadrant    Result Date: 5/22/2024  Impression: 8.8 x 8.3 x 7.1 cm gas-containing right hepatic lobe lesion in keeping with previously identified lung abscess. Small volume of ascites. Pronounced hepatomegaly. 12 mm right hepatic lobe focus potentially a small hemangioma. Workstation performed: [id number]     XR chest 1 view portable    Result Date: 5/22/2024  Impression: No acute consolidation or congestion Workstation performed: [id number]     CT chest abdomen pelvis wo contrast    Result Date: 5/22/2024  Impression: Findings are most concerning for perforated liver abscess with resultant peritonitis. I personally discussed this study with MOISES PERALES on 5/22/2024 4:05 AM. Workstation performed: [id number]       Complications: None    Discharge Diagnosis: Liver Abscess    Medical Problems       Resolved Problems  Date Reviewed: 6/5/2024   None         Condition at Discharge: good         Discharge instructions/Information to patient and family:   See after visit summary for information provided to patient and family.      Provisions for Follow-Up Care:  See after visit summary for information related to follow-up care and any pertinent home health orders.      PCP: No primary care provider on file.    Disposition: Home    Planned Readmission: No      Discharge Statement   I spent 40 minutes discharging the patient. This time was spent on the  day of discharge. I had direct contact with the patient on the day of discharge. Additional documentation is required if more than 30 minutes were spent on discharge.     Discharge Medications:  See after visit summary for reconciled discharge medications provided to patient and family.

## 2024-06-05 NOTE — QUICK NOTE
06/05/24    Patient's midline abdominal dressing was changed. Slight serosanguinis drainage noted from wound and on prior packing. Photograph of wound was taken and can be found in Media.

## 2024-06-05 NOTE — QUICK NOTE
Patient seen and evaluated for RLQ Subhepatic SYMONE drain removal. Patients SYMONE drain removed, area covered with 4 x 4 gauze and secured with tape. Patient tolerated the removal well without any complications.

## 2024-06-05 NOTE — PROGRESS NOTES
Progress Note - Infectious Disease   Diogenes Mullins 38 y.o. male MRN: [id number]  [facility] Encounter: [id number]      Impression/Recommendations:  1.  Severe sepsis.  Tachycardia, tachypnea, leukocytosis, elevated lactic acid.  Secondary to #2.  Blood cultures are negative.  WBC count continues to trend down, now down to 10 today.     -Antibiotic plan as below  -Follow temperatures and hemodynamics  -Check CBC in AM to assess treatment response     2.  Perforated liver abscess with purulent peritonitis.  Unclear etiology, risk factors. Consider biliary anatomic abnormality versus intraluminal GI lesion.  Abdominal CT with no other acute abnormalities including evidence of malignancy. RUQ US with no biliary abnormalities.  No FH of GI malignancy.  Status post exploratory laparotomy with drainage of abscess and four-quadrant peritoneal lavage 5/22. Intraoperative findings included extensive four-quadrant purulent peritonitis with near frozen abdomen due to inflammation.  OR cultures with Group F Strep (unusual biliary pathogen), prevotella.  Pathology negative for malignancy.  Repeat CT showed persistent abscess.  Status post IR drain 5/26 yielding 270 cc pus.  Culture again with growth of group F strep and anaerobes.  EGD with single ulcer in stomach and colonoscopy showed 1 polyp in the ascending colon as well as edematous mucosa in cecum.  Pathology was negative.  Most recent CT with improving hepatic collections.  Patient has received 2 weeks of IV antibiotic during hospitalization.  Will plan to transition to oral antibiotic to continue until radiologic resolution of infection.     -Continue Unasyn while in hospital  -Ongoing drain management  -On discharge, transition to oral Augmentin twice daily to continue until radiologic resolution.  -Will need repeat CT in a few weeks  -Check CBCD, CMP every other week while on oral antibiotic  -Outpatient surgery follow-up  -Outpatient ID follow-up in 2 to 3  weeks after repeat CT is performed.    3.  Acute respiratory insufficiency.  Now remains stable on room air.  Most recent CT does show possibility of left subpulmonic collection.  This area was evaluated by IR with no subpulmonic collection seen via ultrasound.     -Follow O2 requirements.  -Volume management per surgery service     4.  Acute kidney injury.  In the setting of severe sepsis.  No acute  findings on CT.  Creatinine has improved.     5.  Elevated LFTs.  In the setting of #1/#2.  No radiologic evidence of biliary obstruction.  LFTs have improved.     I discussed above plan with patient and with surgery service who agrees with transition to oral antibiotic on discharge.     Antibiotics:  Unasyn  Antibiotics 15  POD14           Subjective:  Patient is doing well.  Another drain was removed.  No fevers.  Tolerating oral intake.  Excited about going home.    Objective:  Vitals:  Temp:  [97.3 °F (36.3 °C)-99.1 °F (37.3 °C)] 97.3 °F (36.3 °C)  HR:  [] 98  Resp:  [18] 18  BP: (111-115)/(63-67) 112/63  SpO2:  [95 %-97 %] 97 %  Temp (24hrs), Av.2 °F (36.8 °C), Min:97.3 °F (36.3 °C), Max:99.1 °F (37.3 °C)  Current: Temperature: (!) 97.3 °F (36.3 °C)    Physical Exam:   General:  No acute distress, nontoxic, sitting comfortably in chair  HEENT atraumatic normocephalic  Neck trachea midline  Psychiatric:  Awake and alert  Pulmonary:  Normal respiratory excursion without accessory muscle use  Abdomen: Stable abdominal incision with packing in place.  No surrounding erythema or visible purulence  Extremities:  No edema  Skin:  No rashes or visible draining wounds  Neuro moves all extremities spontaneously    Lab Results:  I have personally reviewed pertinent labs.  Results from last 7 days   Lab Units 24  0453 24  0911 24  0527 24  0830   POTASSIUM mmol/L 4.0 3.7 3.9 3.9   CHLORIDE mmol/L 102 101 101 104   CO2 mmol/L 22 23 24 25   BUN mg/dL 7 8 7 9   CREATININE mg/dL 0.46* 0.52*  0.47* 0.49*   EGFR ml/min/1.73sq m 141 134 140 137   CALCIUM mg/dL 7.4* 7.5* 7.4* 7.6*   AST U/L  --   --   --  19   ALT U/L  --   --   --  16   ALK PHOS U/L  --   --   --  89     Results from last 7 days   Lab Units 06/05/24  0453 06/04/24  0911 06/03/24  0527   WBC Thousand/uL 10.60* 10.24* 11.37*   HEMOGLOBIN g/dL 8.1* 7.9* 7.6*   PLATELETS Thousands/uL 926* 942* 992*           Imaging Studies:   I have personally reviewed pertinent imaging study reports and images in PACS.    EKG, Pathology, and Other Studies:   I have personally reviewed pertinent reports.

## 2024-06-05 NOTE — DISCHARGE INSTR - AVS FIRST PAGE
Follow-up  Call the Surgical office to make a follow up appointment on Monday 6/10 [phone]  Make an appointment to follow up with your primary care physician in the next 1-2 weeks.  Call the office if you have increased pain not relieved with pain medicine.  Call the office if you have a fever,redness, the wound opens up, you have pus draining from your incision.     Wound care  - Please repack midline abdominal wound with 1 inch plain packing every day. Cover the wound and abdominal pad dressing and secure it with tape.   - You may cover previous SYMONE drain sites with 4 x 4 gauze and secure with tape.     IR Drain  - Record daily output of drain. Empty drain as needed.  - Call IR office when output is less than 10cc a day for two consecutive days.    Activity  No driving until seen in the office.   No driving while taking pain meds.   No heavy lifting or strenuous exercise until you are cleared in the surgery office     Shower  You may shower and get incisions wet, wash with soap and water, do not scrub, rinse, and pat dry.   No baths, swimming pools, or hot tubs, until you follow up in the general surgery office.    Medication  If you do not need strong pain medicine you may take Tylenol.   Do not take acetaminophen (Tylenol) WITH  pain meds that contain acetaminophen. Take one or the other.  Do not exceed more than 4000 mg of acetaminophen in 24 hours or 3000 mg if you have liver disease.   Please take Augmentin as instructed.

## 2024-06-05 NOTE — INCIDENTAL FINDINGS
The following findings require follow up:  Radiographic finding   Findin2024 XR chest portable ICU: Enteric tube should be advanced 5 to 10 cm., Other tubes and lines as above., Right basilar atelectasis and left lower lobe atelectasis versus infiltrate. Trace bilateral pleural effusions better seen on same-day CT     CT abdomen pelvis w contrast 2024: 1. Increased size of the moderate bilateral pleural effusions and bibasilar passive atelectasis., 2. No significant change in the possible left subpulmonic empyema., 3. Increased dilation of the small bowel with possible transition point may represent a small bowel obstruction or ileus., 4. Decreased size of the hepatic abscess following drainage catheter placement., 5. Decrease size of the subcapsular hepatic collection      RUQ ultrasound: 12 mm hyperechoic focus is noted within the right lobe of the liver potentially a small hemangioma.     Patient treated for the above with the following procedures.   diagnostic converted to exploratory laparotomy, washout, and drainage of hepatic abscess   IR drain of liver abscess   IR drain reposition     Patient notified of other incidental findings not treated with the above procedures. Patient verbalized understanding. Patient instructed to follow up with a PCP in 1-2 weeks to discuss hospitalization. Patient instructed to follow up with general surgery clinic on Monday 6/10.

## 2024-06-05 NOTE — PLAN OF CARE
Problem: Prexisting or High Potential for Compromised Skin Integrity  Goal: Skin integrity is maintained or improved  Description: INTERVENTIONS:  - Identify patients at risk for skin breakdown  - Assess and monitor skin integrity  - Assess and monitor nutrition and hydration status  - Monitor labs   - Assess for incontinence   - Turn and reposition patient  - Assist with mobility/ambulation  - Relieve pressure over bony prominences  - Avoid friction and shearing  - Provide appropriate hygiene as needed including keeping skin clean and dry  - Evaluate need for skin moisturizer/barrier cream  - Collaborate with interdisciplinary team   - Patient/family teaching  - Consider wound care consult   Outcome: Progressing     Problem: PAIN - ADULT  Goal: Verbalizes/displays adequate comfort level or baseline comfort level  Description: Interventions:  - Encourage patient to monitor pain and request assistance  - Assess pain using appropriate pain scale  - Administer analgesics based on type and severity of pain and evaluate response  - Implement non-pharmacological measures as appropriate and evaluate response  - Consider cultural and social influences on pain and pain management  - Notify physician/advanced practitioner if interventions unsuccessful or patient reports new pain  Outcome: Progressing     Problem: INFECTION - ADULT  Goal: Absence or prevention of progression during hospitalization  Description: INTERVENTIONS:  - Assess and monitor for signs and symptoms of infection  - Monitor lab/diagnostic results  - Monitor all insertion sites, i.e. indwelling lines, tubes, and drains  - Monitor endotracheal if appropriate and nasal secretions for changes in amount and color  - [place] appropriate cooling/warming therapies per order  - Administer medications as ordered  - Instruct and encourage patient and family to use good hand hygiene technique  - Identify and instruct in appropriate isolation precautions for  identified infection/condition  Outcome: Progressing  Goal: Absence of fever/infection during neutropenic period  Description: INTERVENTIONS:  - Monitor WBC    Outcome: Progressing     Problem: SAFETY ADULT  Goal: Patient will remain free of falls  Description: INTERVENTIONS:  - Educate patient/family on patient safety including physical limitations  - Instruct patient to call for assistance with activity   - Consult OT/PT to assist with strengthening/mobility   - Keep Call bell within reach  - Keep bed low and locked with side rails adjusted as appropriate  - Keep care items and personal belongings within reach  - Initiate and maintain comfort rounds  - Make Fall Risk Sign visible to staff  - Offer Toileting every  Hours, in advance of need  - Initiate/Maintain alarm  - Obtain necessary fall risk management equipment:   - Apply yellow socks and bracelet for high fall risk patients  - Consider moving patient to room near nurses station  Outcome: Progressing  Goal: Maintain or return to baseline ADL function  Description: INTERVENTIONS:  -  Assess patient's ability to carry out ADLs; assess patient's baseline for ADL function and identify physical deficits which impact ability to perform ADLs (bathing, care of mouth/teeth, toileting, grooming, dressing, etc.)  - Assess/evaluate cause of self-care deficits   - Assess range of motion  - Assess patient's mobility; develop plan if impaired  - Assess patient's need for assistive devices and provide as appropriate  - Encourage maximum independence but intervene and supervise when necessary  - Involve family in performance of ADLs  - Assess for home care needs following discharge   - Consider OT consult to assist with ADL evaluation and planning for discharge  - Provide patient education as appropriate  Outcome: Progressing  Goal: Maintains/Returns to pre admission functional level  Description: INTERVENTIONS:  - Perform AM-PAC 6 Click Basic Mobility/ Daily Activity  assessment daily.  - Set and communicate daily mobility goal to care team and patient/family/caregiver.   - Collaborate with rehabilitation services on mobility goals if consulted  - Perform Range of Motion  times a day.  - Reposition patient every  hours.  - Dangle patient  times a day  - Stand patient  times a day  - Ambulate patient  times a day  - Out of bed to chair  times a day   - Out of bed for meals  times a day  - Out of bed for toileting  - Record patient progress and toleration of activity level   Outcome: Progressing     Problem: DISCHARGE PLANNING  Goal: Discharge to home or other facility with appropriate resources  Description: INTERVENTIONS:  - Identify barriers to discharge w/patient and caregiver  - Arrange for needed discharge resources and transportation as appropriate  - Identify discharge learning needs (meds, wound care, etc.)  - Arrange for interpretive services to assist at discharge as needed  - Refer to Case Management Department for coordinating discharge planning if the patient needs post-hospital services based on physician/advanced practitioner order or complex needs related to functional status, cognitive ability, or social support system  Outcome: Progressing     Problem: Nutrition/Hydration-ADULT  Goal: Nutrient/Hydration intake appropriate for improving, restoring or maintaining nutritional needs  Description: Monitor and assess patient's nutrition/hydration status for malnutrition. Collaborate with interdisciplinary team and initiate plan and interventions as ordered.  Monitor patient's weight and dietary intake as ordered or per policy. Utilize nutrition screening tool and intervene as necessary. Determine patient's food preferences and provide high-protein, high-caloric foods as appropriate.     INTERVENTIONS:  - Monitor oral intake, urinary output, labs, and treatment plans  - Assess nutrition and hydration status and recommend course of action  - Evaluate amount of meals  eaten  - Assist patient with eating if necessary   - Allow adequate time for meals  - Recommend/ encourage appropriate diets, oral nutritional supplements, and vitamin/mineral supplements  - Order, calculate, and assess calorie counts as needed  - Recommend, monitor, and adjust tube feedings and TPN/PPN based on assessed needs  - Assess need for intravenous fluids  - Provide specific nutrition/hydration education as appropriate  - Include patient/family/caregiver in decisions related to nutrition  Outcome: Progressing

## 2024-06-05 NOTE — PROGRESS NOTES
Progress Note - General Surgery   Diogenes Mullins 38 y.o. male MRN: [id number]  Unit/Bed#: [facility] 925-01 Encounter: [id number]    Assessment:  Diogenes Mullins is a 38 y.o. male patient with a perforated hepatic abscess status post diagnostic laparoscopy converted to exploratory laparotomy with subsequent drainage of hepatic abscess, and four quadrant peritoneal lavage for purulent peritonitis on 5/22. Patient has a persistent hepatic abscess following IR drain placement on 5/26.     Plan:  - Diet as tolerated  - BID PPI  - Pull SYMONE drain  - Follow up ID recs  - Monitor IR drain   - Continue daily wound care/packing changes   - Pain and nausea control PRN  - Encourage IS, ambulation   - DVT ppx     Subjective:  No acute events overnight. Pain controlled. Patient denies nausea, vomiting, fever, chills, chest pain, shortness of breath. Looser BM, Voiding. OOB.     Objective:    Vitals:   Temp:  [97.3 °F (36.3 °C)-99.1 °F (37.3 °C)] 97.3 °F (36.3 °C)  HR:  [] 98  Resp:  [18] 18  BP: (111-115)/(63-67) 112/63  Body mass index is 36.09 kg/m².    Physical Exam:   Gen: NAD, Resting in bed  Neuro: A&O, No focal deficits  Head: Normal Cephalic, Atraumatic  Eye: EOMI, No scleral icterus  CV: Regular rate  Pulm: Normal work of breathing, No respiratory distress on RA  Abd: Soft, Mildly Distended, Non-Tender   Ext: No edema bilateral lower extremities, Non-tender  Skin: Warm, Dry, Intact    I/O:  U.O: 500 cc  SYMONE: scant  IR: 40 cc serofibrinous     Intake/Output Summary (Last 24 hours) at 6/5/2024 0931  Last data filed at 6/5/2024 0529  Gross per 24 hour   Intake 0 ml   Output 540 ml   Net -540 ml       Lab Results:  Recent Labs     06/03/24  0527 06/04/24  0911 06/05/24  0453   WBC 11.37* 10.24* 10.60*   HGB 7.6* 7.9* 8.1*   * 942* 926*   SODIUM 135 136 137   K 3.9 3.7 4.0    101 102   CO2 24 23 22   BUN 7 8 7   CREATININE 0.47* 0.52* 0.46*   GLUC 87 84 85   CALCIUM 7.4* 7.5* 7.4*   MG 1.8*  --   --    PHOS 3.6  --    --        ---    Maru Gamble MD  General Surgery PGY-I

## 2024-06-05 NOTE — PLAN OF CARE
Problem: Prexisting or High Potential for Compromised Skin Integrity  Goal: Skin integrity is maintained or improved  Description: INTERVENTIONS:  - Identify patients at risk for skin breakdown  - Assess and monitor skin integrity  - Assess and monitor nutrition and hydration status  - Monitor labs   - Assess for incontinence   - Turn and reposition patient  - Assist with mobility/ambulation  - Relieve pressure over bony prominences  - Avoid friction and shearing  - Provide appropriate hygiene as needed including keeping skin clean and dry  - Evaluate need for skin moisturizer/barrier cream  - Collaborate with interdisciplinary team   - Patient/family teaching  - Consider wound care consult   Outcome: Progressing     Problem: PAIN - ADULT  Goal: Verbalizes/displays adequate comfort level or baseline comfort level  Description: Interventions:  - Encourage patient to monitor pain and request assistance  - Assess pain using appropriate pain scale  - Administer analgesics based on type and severity of pain and evaluate response  - Implement non-pharmacological measures as appropriate and evaluate response  - Consider cultural and social influences on pain and pain management  - Notify physician/advanced practitioner if interventions unsuccessful or patient reports new pain  Outcome: Progressing     Problem: INFECTION - ADULT  Goal: Absence or prevention of progression during hospitalization  Description: INTERVENTIONS:  - Assess and monitor for signs and symptoms of infection  - Monitor lab/diagnostic results  - Monitor all insertion sites, i.e. indwelling lines, tubes, and drains  - Monitor endotracheal if appropriate and nasal secretions for changes in amount and color  - [place] appropriate cooling/warming therapies per order  - Administer medications as ordered  - Instruct and encourage patient and family to use good hand hygiene technique  - Identify and instruct in appropriate isolation precautions for  identified infection/condition  Outcome: Progressing     Problem: Nutrition/Hydration-ADULT  Goal: Nutrient/Hydration intake appropriate for improving, restoring or maintaining nutritional needs  Description: Monitor and assess patient's nutrition/hydration status for malnutrition. Collaborate with interdisciplinary team and initiate plan and interventions as ordered.  Monitor patient's weight and dietary intake as ordered or per policy. Utilize nutrition screening tool and intervene as necessary. Determine patient's food preferences and provide high-protein, high-caloric foods as appropriate.     INTERVENTIONS:  - Monitor oral intake, urinary output, labs, and treatment plans  - Assess nutrition and hydration status and recommend course of action  - Evaluate amount of meals eaten  - Assist patient with eating if necessary   - Allow adequate time for meals  - Recommend/ encourage appropriate diets, oral nutritional supplements, and vitamin/mineral supplements  - Order, calculate, and assess calorie counts as needed  - Recommend, monitor, and adjust tube feedings and TPN/PPN based on assessed needs  - Assess need for intravenous fluids  - Provide specific nutrition/hydration education as appropriate  - Include patient/family/caregiver in decisions related to nutrition  Outcome: Progressing

## 2024-06-06 ENCOUNTER — TELEPHONE (OUTPATIENT)
Age: 39
End: 2024-06-06

## 2024-06-06 DIAGNOSIS — K75.0 LIVER ABSCESS: Primary | ICD-10-CM

## 2024-06-06 NOTE — PROGRESS NOTES
OPAT NOTE    Supervising/Discharge physician: Blossom     Diagnosis: perforated liver abscess with purulent peritonitis     Drug: Augmentin     Dose/Route/Frequency: 875-125mg PO Q12     Labs/Frequency:CBCD CMP every other week    Imaging: CT A/P w/ contrast due prior to appointment (surgery will order)    End Date: CT resolution     Infusion/VNA contact: N/A    Next appointment: 6/26        MA assigned: Sydni      yes

## 2024-06-06 NOTE — TELEPHONE ENCOUNTER
Pt was discharge today and was told he needs an appt for Monday.  He still has drains in his liver.  I don't have any opening in [place] for Monday, like his discharge states, but I do have for Thursday.  Mother asked if he can go to [place] since it is closer, I explained that we typically prefer if he sees the Dr's office who preformed the surgery. [place] had an opening for Monday, so I scheduled him in but I told her I would send a message to [facility] asking if they can fit him in and have someone call him back tomorrow and let him know.  After speaking pt mother would prefer he be seen at [place] since it's right at the hospital.

## 2024-06-06 NOTE — TELEPHONE ENCOUNTER
Patient mom is calling to schedule appointment with Dr Clifton, Gave mom the phone number to Trauma center to schedule appt.

## 2024-06-07 ENCOUNTER — TELEPHONE (OUTPATIENT)
Dept: INFECTIOUS DISEASES | Facility: CLINIC | Age: 39
End: 2024-06-07

## 2024-06-07 ENCOUNTER — TELEPHONE (OUTPATIENT)
Dept: SURGERY | Facility: CLINIC | Age: 39
End: 2024-06-07

## 2024-06-07 ENCOUNTER — NURSE TRIAGE (OUTPATIENT)
Dept: OTHER | Facility: OTHER | Age: 39
End: 2024-06-07

## 2024-06-07 DIAGNOSIS — K75.0 LIVER ABSCESS: Primary | ICD-10-CM

## 2024-06-07 DIAGNOSIS — K75.0 LIVER ABSCESS: ICD-10-CM

## 2024-06-07 RX ORDER — OXYCODONE HYDROCHLORIDE 5 MG/1
5 TABLET ORAL EVERY 4 HOURS PRN
Qty: 15 TABLET | Refills: 0 | Status: SHIPPED | OUTPATIENT
Start: 2024-06-07 | End: 2024-06-08 | Stop reason: SDUPTHER

## 2024-06-07 NOTE — TELEPHONE ENCOUNTER
Pt is s/p liver abscess; has follow up appt with us on Mon., 6/10.  Pt is requesting 6 more Oxycodone pills to get him through the weekend, until Monday's appt.    I'm sending this to Dr. Ullrich for advisement.    mb

## 2024-06-07 NOTE — TELEPHONE ENCOUNTER
"Regarding: Post Op - pain, running out of Oxycodone  ----- Message from Bernadette PANDYA sent at 6/7/2024  7:29 PM EDT -----  \" I had surgery on 5/22. I am in great deal of pain and I am about to run out of my Oxycodone\"    "

## 2024-06-07 NOTE — TELEPHONE ENCOUNTER
Medication: Oxycodone    Dose/Frequency: 1 tablet by mouth every 4 hrs    Quantity: 6    Pharmacy: \[facility] [place]    Office:   [] PCP/Provider -   [x] Speciality/Provider -     Does the patient have enough for 3 days?   [] Yes   [x] No - Send as HP to POD            Pt is in need of only 6 pills ( 3 for Saturday and 3 for Sunday) He has appt on 06/10/24 for follow up with .

## 2024-06-07 NOTE — TELEPHONE ENCOUNTER
s/p DIAGNOSTIC LAPAROSCOPY CONVERTED TO EXPLORATORY LAPAROTOMY, DRAINAGE OF HEPATIC ABSCESS, 4 QUADRANT PERITONEAL LAVAGE, PLACEMENT OF DRAINS. (Abdomen) due to Liver Abscess on 5/22/24 with Isauro Clifton DO. c/o severe lower abdominal pain rated 9/10 not resolved with Medication. Also reports Moderate pitting edema reaching his mid-thigh. Currently on Oxycodone (Roxicodone) 5 mg Q4 hours, However patient was taking it Q6 hours to try to stretch it out. Also taking Tylenol 600-900 mg QD. Only four tablets left. Patient has appointment on 6/10. States 5 mg is not helping with pain, will like to have medication increased to 10 mg like in the hospital, and requesting just enough medication until Monday. Patient states dressing/packing wound care and placement makes pain worse. No additional symptoms.       On call provider contacted and informed of patient's concerns and status.    Provider recommends proceeding to ED for evaluation.     Informed of provider's recommendation, Patient declining recommendation. Requesting to speak with on-call. Also now states, only wants medication, and 5 mg will be ok. States earlier 5 mg Oxycodone along with Tylenol was not helping with pain.     On-call provider messaged.   Provider advises as follow:  Will order 5 MG Oxycodone enough for Monday, however if abdominal pain worsens, please proceed to ED for eval.     Informed of provider's recommendation, along with care advice. Verbalized understanding. Agreeable with disposition. No further questions.

## 2024-06-07 NOTE — TELEPHONE ENCOUNTER
"Reason for Disposition  • [1] SEVERE post-op pain (e.g., excruciating, pain scale 8-10) AND [2] not controlled with pain medications    Answer Assessment - Initial Assessment Questions  1. SYMPTOM: \"What's the main symptom you're concerned about?\" (e.g., pain, fever, vomiting)      Severe pain Lower abdominal area   2. ONSET: \"When did it start?\"      Worsening   3. SURGERY: \"What surgery was performed?\"      s/p DIAGNOSTIC LAPAROSCOPY CONVERTED TO EXPLORATORY LAPAROTOMY, DRAINAGE OF HEPATIC ABSCESS, 4 QUADRANT PERITONEAL LAVAGE, PLACEMENT OF DRAINS. (Abdomen) due to Liver Abscess on 5/22/24 with Isauro Clifton DO  4. DATE of SURGERY: \"When was surgery performed?\"       5/22  5. ANESTHESIA: \" What type of anesthesia did you have?\" (e.g., general, spinal, epidural, local)      General   6. PAIN: \"Is there any pain?\" If Yes, ask: \"How bad is it?\"  (Scale 1-10; or mild, moderate, severe)     9/10  7. FEVER: \"Do you have a fever?\" If Yes, ask: \"What is your temperature, how was it measured, and when did it start?\"      Denies   8. VOMITING: \"Is there any vomiting?\" If yes, ask: \"How many times?\"      Denies   9. BLEEDING: \"Is there any bleeding?\" If Yes, ask: \"How much?\" and \"Where?\"    Denies   10. OTHER SYMPTOMS: \"Do you have any other symptoms?\" (e.g., drainage from wound, painful urination, constipation)        Edema of BLE  reaching up to middle of thigh (pitting)     650-900 MG Tylenol every day.    Protocols used: Post-Op Symptoms and Questions-ADULT-    "

## 2024-06-07 NOTE — TELEPHONE ENCOUNTER
Called patient today to go over antibiotic plan, labs and follow up appointment today.   Called was dropped prior to going over everything.

## 2024-06-08 DIAGNOSIS — K75.0 LIVER ABSCESS: ICD-10-CM

## 2024-06-08 RX ORDER — OXYCODONE HYDROCHLORIDE 5 MG/1
5 TABLET ORAL EVERY 4 HOURS PRN
Qty: 10 TABLET | Refills: 0 | Status: SHIPPED | OUTPATIENT
Start: 2024-06-08 | End: 2024-06-11

## 2024-06-10 ENCOUNTER — OFFICE VISIT (OUTPATIENT)
Dept: SURGERY | Facility: CLINIC | Age: 39
End: 2024-06-10

## 2024-06-10 VITALS — BODY MASS INDEX: 35.8 KG/M2 | TEMPERATURE: 97.4 F | HEIGHT: 70 IN | WEIGHT: 250.1 LBS

## 2024-06-10 DIAGNOSIS — K75.0 LIVER ABSCESS: Primary | ICD-10-CM

## 2024-06-10 LAB — FUNGUS SPEC CULT: NORMAL

## 2024-06-10 PROCEDURE — 99024 POSTOP FOLLOW-UP VISIT: CPT | Performed by: SURGERY

## 2024-06-10 RX ORDER — NALOXONE HYDROCHLORIDE 4 MG/.1ML
SPRAY NASAL
Qty: 1 EACH | Refills: 1 | Status: SHIPPED | OUTPATIENT
Start: 2024-06-10 | End: 2025-06-10

## 2024-06-10 RX ORDER — TRAMADOL HYDROCHLORIDE 50 MG/1
50 TABLET ORAL EVERY 8 HOURS PRN
Qty: 21 TABLET | Refills: 0 | Status: SHIPPED | OUTPATIENT
Start: 2024-06-10 | End: 2024-06-17

## 2024-06-10 RX ORDER — GABAPENTIN 100 MG/1
100 CAPSULE ORAL 3 TIMES DAILY
Qty: 21 CAPSULE | Refills: 0 | Status: SHIPPED | OUTPATIENT
Start: 2024-06-10

## 2024-06-10 RX ORDER — METHOCARBAMOL 500 MG/1
500 TABLET, FILM COATED ORAL 4 TIMES DAILY
Qty: 28 TABLET | Refills: 0 | Status: SHIPPED | OUTPATIENT
Start: 2024-06-10 | End: 2024-06-17 | Stop reason: SDUPTHER

## 2024-06-10 NOTE — ASSESSMENT & PLAN NOTE
38-year-old male with recent history of purulent peritonitis secondary to perforated liver abscess status post diagnostic laparoscopy converted to exploratory laparotomy, drainage of hepatic abscess, four-quadrant lavage and placement of multiple drains, course complicated by recurrent abscess requiring IR drainage on 5/26.  Surgical drains removed prior to discharge now with 1 IR drain and hepatic abscess cavity.  Presents for follow-up.    Plan:  Reports ongoing pain around his IR drain but otherwise doing well, tolerating a diet and having bowel function  Follow-up with IR for drain check later this week  Follow-up with ID after obtaining CT scan, planning for Augmentin twice daily until radiologic resolution of hepatic abscess cavity  Continue local wound care to midline with daily packing changes with half-inch plain packing  Will send 1 additional week of tramadol for pain control, additional week of Robaxin and gabapentin and ongoing Tylenol

## 2024-06-10 NOTE — PROGRESS NOTES
Ambulatory Visit  Name: Diogenes Mullins      : 1985      MRN: [id number]  Encounter Provider: General Surgery Generic Physician Nima  Encounter Date: 6/10/2024   Encounter department: [facility]    Assessment & Plan   1. Liver abscess  Assessment & Plan:  38-year-old male with recent history of purulent peritonitis secondary to perforated liver abscess status post diagnostic laparoscopy converted to exploratory laparotomy, drainage of hepatic abscess, four-quadrant lavage and placement of multiple drains, course complicated by recurrent abscess requiring IR drainage on .  Surgical drains removed prior to discharge now with 1 IR drain and hepatic abscess cavity.  Presents for follow-up.    Plan:  Reports ongoing pain around his IR drain but otherwise doing well, tolerating a diet and having bowel function  Follow-up with IR for drain check later this week  Follow-up with ID after obtaining CT scan, planning for Augmentin twice daily until radiologic resolution of hepatic abscess cavity  Continue local wound care to midline with daily packing changes with half-inch plain packing  Will send 1 additional week of tramadol for pain control, additional week of Robaxin and gabapentin and ongoing Tylenol  Orders:  -     methocarbamol (ROBAXIN) 500 mg tablet; Take 1 tablet (500 mg total) by mouth 4 (four) times a day  -     gabapentin (Neurontin) 100 mg capsule; Take 1 capsule (100 mg total) by mouth 3 (three) times a day  -     traMADol (Ultram) 50 mg tablet; Take 1 tablet (50 mg total) by mouth every 8 (eight) hours as needed for severe pain for up to 7 days  -     naloxone (NARCAN) 4 mg/0.1 mL nasal spray; Administer 1 spray into a nostril. If no response after 2-3 minutes, give another dose in the other nostril using a new spray.      History of Present Illness     Diogenes Mullins is a 38 y.o. male who presents for follow-up.  Patient has a recent history of purulent peritonitis  secondary to perforated liver abscess status post diagnostic laparoscopy converted to exploratory laparotomy, drainage of hepatic abscess, four-quadrant lavage and placement of multiple drains on 5/22.  His course was complicated by recurrent abscess requiring IR drainage on 5/26.  His surgical drains were removed prior to discharge from the hospital and he now only has 1 IR drain remaining.  He also had superficial dehiscence of his midline wound requiring daily packing changes which she has been doing himself.  He reports currently he is doing well, tolerating a regular diet without nausea or vomiting.  He is having bowel function.  He reports ongoing pain near his IR drain site.  Drain has been putting out small amount of purulent appearing fluid. He reports he has follow-up with IR later this week and ID in the coming weeks.    Review of Systems   Constitutional: Negative.    HENT: Negative.     Eyes: Negative.    Respiratory: Negative.     Cardiovascular: Negative.    Gastrointestinal:  Positive for abdominal pain (Near IR drain).   Endocrine: Negative.    Genitourinary: Negative.    Musculoskeletal: Negative.    Skin:  Positive for wound (Midline wound, no erythema or drainage).   Neurological: Negative.    Psychiatric/Behavioral: Negative.       Pertinent Medical History         Past Medical History   History reviewed. No pertinent past medical history.  Past Surgical History:   Procedure Laterality Date    IR CHEST TUBE PLACEMENT  5/31/2024    IR DRAINAGE TUBE CHECK/CHANGE/REPOSITION/REINSERTION/UPSIZE  5/31/2024    IR DRAINAGE TUBE PLACEMENT  5/26/2024    KY LAPS ABD PRTM&OMENTUM DX W/WO SPEC BR/WA SPX N/A 5/22/2024    Procedure: DIAGNOSTIC LAPAROSCOPY CONVERTED TO EXPLORATORY LAPAROTOMY, DRAINAGE OF HEPATIC ABSCESS, 4 QUADRANT PERITONEAL LAVAGE, PLACEMENT OF DRAINS.;  Surgeon: Isauro Clifton DO;  Location: [facility];  Service: General     History reviewed. No pertinent family history.  Current  Outpatient Medications on File Prior to Visit   Medication Sig Dispense Refill    amoxicillin-clavulanate (AUGMENTIN) 875-125 mg per tablet Take 1 tablet by mouth every 12 (twelve) hours for 21 days 42 tablet 0    oxyCODONE (Roxicodone) 5 immediate release tablet Take 1 tablet (5 mg total) by mouth every 4 (four) hours as needed for moderate pain for up to 3 days Max Daily Amount: 30 mg 10 tablet 0    pantoprazole (PROTONIX) 40 mg tablet Take 1 tablet (40 mg total) by mouth 2 (two) times a day before meals 60 tablet 0    sodium chloride, PF, 0.9 % 10 mL by Intracatheter route daily for 120 doses Intracatheter flushing daily. May substitute prefilled syringe with normal saline 10 mL vials, 10 mL syringes, and 18 g blunt needles 300 mL 3    [DISCONTINUED] gabapentin (NEURONTIN) 100 mg capsule Take 1 capsule (100 mg total) by mouth 3 (three) times a day for 7 days 21 capsule 0    [DISCONTINUED] methocarbamol (ROBAXIN) 750 mg tablet Take 1 tablet (750 mg total) by mouth every 6 (six) hours for 7 days 28 tablet 0     No current facility-administered medications on file prior to visit.   No Known Allergies   Current Outpatient Medications on File Prior to Visit   Medication Sig Dispense Refill    amoxicillin-clavulanate (AUGMENTIN) 875-125 mg per tablet Take 1 tablet by mouth every 12 (twelve) hours for 21 days 42 tablet 0    oxyCODONE (Roxicodone) 5 immediate release tablet Take 1 tablet (5 mg total) by mouth every 4 (four) hours as needed for moderate pain for up to 3 days Max Daily Amount: 30 mg 10 tablet 0    pantoprazole (PROTONIX) 40 mg tablet Take 1 tablet (40 mg total) by mouth 2 (two) times a day before meals 60 tablet 0    sodium chloride, PF, 0.9 % 10 mL by Intracatheter route daily for 120 doses Intracatheter flushing daily. May substitute prefilled syringe with normal saline 10 mL vials, 10 mL syringes, and 18 g blunt needles 300 mL 3    [DISCONTINUED] gabapentin (NEURONTIN) 100 mg capsule Take 1 capsule (100  "mg total) by mouth 3 (three) times a day for 7 days 21 capsule 0    [DISCONTINUED] methocarbamol (ROBAXIN) 750 mg tablet Take 1 tablet (750 mg total) by mouth every 6 (six) hours for 7 days 28 tablet 0     No current facility-administered medications on file prior to visit.      Social History     Tobacco Use    Smoking status: Former     Types: Cigarettes    Smokeless tobacco: Never   Vaping Use    Vaping status: Former   Substance and Sexual Activity    Alcohol use: Not Currently    Drug use: Never    Sexual activity: Not Currently     Objective     Temp (!) 97.4 °F (36.3 °C) (Temporal)   Ht 5' 10\" (1.778 m)   Wt 113 kg (250 lb 1.6 oz)   BMI 35.89 kg/m²     Physical Exam  Constitutional:       Appearance: Normal appearance.   HENT:      Head: Normocephalic and atraumatic.      Right Ear: External ear normal.      Left Ear: External ear normal.      Nose: Nose normal.      Mouth/Throat:      Mouth: Mucous membranes are moist.      Pharynx: Oropharynx is clear.   Eyes:      Extraocular Movements: Extraocular movements intact.      Conjunctiva/sclera: Conjunctivae normal.      Pupils: Pupils are equal, round, and reactive to light.   Cardiovascular:      Rate and Rhythm: Normal rate and regular rhythm.      Pulses: Normal pulses.   Pulmonary:      Effort: Pulmonary effort is normal.   Abdominal:      General: Abdomen is flat.      Palpations: Abdomen is soft.      Tenderness: There is no abdominal tenderness.      Comments: Midline incision with skin dehiscence, clean granulation tissue, re-packed with 1/2 inch plain packing  IR drain with purulent output   Musculoskeletal:         General: Normal range of motion.      Cervical back: Normal range of motion.   Skin:     General: Skin is warm and dry.   Neurological:      General: No focal deficit present.      Mental Status: He is alert and oriented to person, place, and time.   Psychiatric:         Mood and Affect: Mood normal.         Behavior: Behavior normal. "       Administrative Statements

## 2024-06-11 ENCOUNTER — TELEPHONE (OUTPATIENT)
Dept: INFECTIOUS DISEASES | Facility: CLINIC | Age: 39
End: 2024-06-11

## 2024-06-11 DIAGNOSIS — K75.0 LIVER ABSCESS: Primary | ICD-10-CM

## 2024-06-11 NOTE — TELEPHONE ENCOUNTER
Called and spoke with patient today.   Went over antibiotic plan,labs and follow up appointment. Also informed patient he needs repeat CT scan done 6/18/24. Patient given central scheduling phone number to schedule.   Informed patient if there are any further questions or concerns to call the office   Patient verbalizes understanding at this time.

## 2024-06-12 ENCOUNTER — APPOINTMENT (OUTPATIENT)
Dept: LAB | Facility: HOSPITAL | Age: 39
End: 2024-06-12

## 2024-06-12 ENCOUNTER — HOSPITAL ENCOUNTER (OUTPATIENT)
Dept: NON INVASIVE DIAGNOSTICS | Facility: HOSPITAL | Age: 39
Discharge: HOME | End: 2024-06-12
Attending: STUDENT IN AN ORGANIZED HEALTH CARE EDUCATION/TRAINING PROGRAM

## 2024-06-12 ENCOUNTER — PREP FOR PROCEDURE (OUTPATIENT)
Dept: INTERVENTIONAL RADIOLOGY/VASCULAR | Facility: CLINIC | Age: 39
End: 2024-06-12

## 2024-06-12 ENCOUNTER — TELEPHONE (OUTPATIENT)
Age: 39
End: 2024-06-12

## 2024-06-12 DIAGNOSIS — K75.0 LIVER ABSCESS: ICD-10-CM

## 2024-06-12 DIAGNOSIS — K75.0 LIVER ABSCESS: Primary | ICD-10-CM

## 2024-06-12 PROCEDURE — 76080 X-RAY EXAM OF FISTULA: CPT | Performed by: STUDENT IN AN ORGANIZED HEALTH CARE EDUCATION/TRAINING PROGRAM

## 2024-06-12 PROCEDURE — 76080 X-RAY EXAM OF FISTULA: CPT

## 2024-06-12 PROCEDURE — 49424 ASSESS CYST CONTRAST INJECT: CPT | Performed by: STUDENT IN AN ORGANIZED HEALTH CARE EDUCATION/TRAINING PROGRAM

## 2024-06-12 PROCEDURE — 49424 ASSESS CYST CONTRAST INJECT: CPT

## 2024-06-12 RX ADMIN — IOHEXOL 10 ML: 350 INJECTION, SOLUTION INTRAVENOUS at 10:29

## 2024-06-12 NOTE — BRIEF OP NOTE (RAD/CATH)
INTERVENTIONAL RADIOLOGY PROCEDURE NOTE    Date: 6/12/2024    Procedure:   Procedure Summary       Date: 06/12/24 Room / Location: [facility] Cardiac Cath Lab    Anesthesia Start:  Anesthesia Stop:     Procedure: IR DRAINAGE TUBE CHECK/CHANGE/REPOSITION/REINSERTION/UPSIZE Diagnosis:       Liver abscess      (Hepatic abscess)    Scheduled Providers:  Responsible Provider:     Anesthesia Type: Not recorded ASA Status: Not recorded            Preoperative diagnosis:   1. Liver abscess         Postoperative diagnosis: Same.    Surgeon: Irwin Arthur MD     Assistant: None. No qualified resident was available.    Blood loss: 0 ml    Specimens: none.     Findings:   Abscessogram showed persistent cavity. Output remains 30 ml/day.    Catheter left in place.    Complications: None immediate.    Anesthesia: none

## 2024-06-12 NOTE — TELEPHONE ENCOUNTER
Pt of Dr. Garcia calling in to inquire about lab orders.   Advised pt of lab orders seen: CBC W DIFF, CMP   Standing Order Information    Remaining Occurrences Interval Next Expected Expires     2/2 Every 2 Weeks 6        No other questions at this time.

## 2024-06-12 NOTE — SEDATION DOCUMENTATION
Contrast injected Images taken and reviewed with Dr. Arthur, tube still draining 25 mls daily, tube will stay intact and next tube check in 2 weeks, patient verbalized dressing change and flush  instructions, supplies given

## 2024-06-17 ENCOUNTER — OFFICE VISIT (OUTPATIENT)
Dept: SURGERY | Facility: CLINIC | Age: 39
End: 2024-06-17

## 2024-06-17 ENCOUNTER — APPOINTMENT (OUTPATIENT)
Dept: LAB | Facility: CLINIC | Age: 39
End: 2024-06-17

## 2024-06-17 DIAGNOSIS — K25.9 STOMACH ULCER: ICD-10-CM

## 2024-06-17 DIAGNOSIS — M79.605 LEFT LEG PAIN: ICD-10-CM

## 2024-06-17 DIAGNOSIS — K75.0 LIVER ABSCESS: Primary | ICD-10-CM

## 2024-06-17 DIAGNOSIS — K75.0 LIVER ABSCESS: ICD-10-CM

## 2024-06-17 LAB
ALBUMIN SERPL BCG-MCNC: 3.6 G/DL (ref 3.5–5)
ALP SERPL-CCNC: 82 U/L (ref 34–104)
ALT SERPL W P-5'-P-CCNC: 13 U/L (ref 7–52)
ANION GAP SERPL CALCULATED.3IONS-SCNC: 10 MMOL/L (ref 4–13)
AST SERPL W P-5'-P-CCNC: 15 U/L (ref 13–39)
BASOPHILS # BLD AUTO: 0.06 THOUSANDS/ÂΜL (ref 0–0.1)
BASOPHILS NFR BLD AUTO: 1 % (ref 0–1)
BILIRUB SERPL-MCNC: 0.31 MG/DL (ref 0.2–1)
BUN SERPL-MCNC: 16 MG/DL (ref 5–25)
CALCIUM SERPL-MCNC: 8.9 MG/DL (ref 8.4–10.2)
CHLORIDE SERPL-SCNC: 104 MMOL/L (ref 96–108)
CO2 SERPL-SCNC: 25 MMOL/L (ref 21–32)
CREAT SERPL-MCNC: 0.69 MG/DL (ref 0.6–1.3)
EOSINOPHIL # BLD AUTO: 0.37 THOUSAND/ÂΜL (ref 0–0.61)
EOSINOPHIL NFR BLD AUTO: 4 % (ref 0–6)
ERYTHROCYTE [DISTWIDTH] IN BLOOD BY AUTOMATED COUNT: 15.6 % (ref 11.6–15.1)
FUNGUS SPEC CULT: NORMAL
GFR SERPL CREATININE-BSD FRML MDRD: 118 ML/MIN/1.73SQ M
GLUCOSE SERPL-MCNC: 102 MG/DL (ref 65–140)
HCT VFR BLD AUTO: 36.8 % (ref 36.5–49.3)
HGB BLD-MCNC: 11.2 G/DL (ref 12–17)
IMM GRANULOCYTES # BLD AUTO: 0.07 THOUSAND/UL (ref 0–0.2)
IMM GRANULOCYTES NFR BLD AUTO: 1 % (ref 0–2)
LYMPHOCYTES # BLD AUTO: 1.68 THOUSANDS/ÂΜL (ref 0.6–4.47)
LYMPHOCYTES NFR BLD AUTO: 16 % (ref 14–44)
MCH RBC QN AUTO: 29.4 PG (ref 26.8–34.3)
MCHC RBC AUTO-ENTMCNC: 30.4 G/DL (ref 31.4–37.4)
MCV RBC AUTO: 97 FL (ref 82–98)
MONOCYTES # BLD AUTO: 0.87 THOUSAND/ÂΜL (ref 0.17–1.22)
MONOCYTES NFR BLD AUTO: 8 % (ref 4–12)
NEUTROPHILS # BLD AUTO: 7.53 THOUSANDS/ÂΜL (ref 1.85–7.62)
NEUTS SEG NFR BLD AUTO: 70 % (ref 43–75)
NRBC BLD AUTO-RTO: 0 /100 WBCS
PLATELET # BLD AUTO: 656 THOUSANDS/UL (ref 149–390)
PMV BLD AUTO: 8.9 FL (ref 8.9–12.7)
POTASSIUM SERPL-SCNC: 4.1 MMOL/L (ref 3.5–5.3)
PROT SERPL-MCNC: 7.5 G/DL (ref 6.4–8.4)
RBC # BLD AUTO: 3.81 MILLION/UL (ref 3.88–5.62)
SODIUM SERPL-SCNC: 139 MMOL/L (ref 135–147)
WBC # BLD AUTO: 10.58 THOUSAND/UL (ref 4.31–10.16)

## 2024-06-17 PROCEDURE — 85025 COMPLETE CBC W/AUTO DIFF WBC: CPT

## 2024-06-17 PROCEDURE — 36415 COLL VENOUS BLD VENIPUNCTURE: CPT

## 2024-06-17 PROCEDURE — 80053 COMPREHEN METABOLIC PANEL: CPT

## 2024-06-17 PROCEDURE — 99024 POSTOP FOLLOW-UP VISIT: CPT | Performed by: SURGERY

## 2024-06-17 RX ORDER — METHOCARBAMOL 500 MG/1
500 TABLET, FILM COATED ORAL 4 TIMES DAILY
Qty: 28 TABLET | Refills: 0 | Status: SHIPPED | OUTPATIENT
Start: 2024-06-17 | End: 2024-06-17

## 2024-06-17 RX ORDER — METHOCARBAMOL 500 MG/1
500 TABLET, FILM COATED ORAL 4 TIMES DAILY
Qty: 28 TABLET | Refills: 0 | Status: SHIPPED | OUTPATIENT
Start: 2024-06-17

## 2024-06-17 NOTE — PROGRESS NOTES
Ambulatory Visit  Name: Diogenes Mullins      : 1985      MRN: [id number]  Encounter Provider: General Surgery Generic Physician Nima  Encounter Date: 2024   Enc[facility]    Assessment & Plan   1. Liver abscess  2. Left leg pain  3. Stomach ulcer      Plan:  Will refer to GI for stomach ulcer and new food intolerance. He may also benefit from surveillance of his liver function given abscess  Follow up this week per ID  Follow up with IR for IR drain - continued purulent output  Still having significant pain post operatively. Since he is now almost a month post op and requesting narcotic vs tramadol for pain, will refer to pain clinic for assistance of chronic pain  PT follow up for left leg pain  Will see in clinic in 1 week for continued midline wound checks - packing changed in clinic today and staples removed from inferior portion which was well healed.    History of Present Illness     Diogenes Mullins is a 39 y.o. male who presents for midline wound assessment in follow up of an exploratory laparotomy with a perforated liver abscess and purulent peritonitis on . Patient was seen for an IR drain check last week revealing persistent cavity and IR drain kept in place. He has a repeat CT scan planned this week and follow up with ID. Since his last visit, he reports continued pain at the drain site. He also reports feeling fatigued and having difficulty tolerating some foods without pain. He has left thigh pain that has been persistent and there since before he was admitted to the hospital. He denies fever or chils, denies nausea or vomiting.    Review of Systems   Constitutional:  Positive for appetite change. Negative for activity change, chills and fever.   Respiratory:  Negative for shortness of breath.    Cardiovascular:  Negative for chest pain.   Gastrointestinal:  Positive for abdominal pain. Negative for abdominal distention, constipation,  nausea and vomiting.   Genitourinary:  Negative for difficulty urinating.   Skin:  Positive for wound. Negative for color change.   Neurological:  Negative for dizziness and weakness.   Psychiatric/Behavioral:  Negative for agitation, behavioral problems and confusion.    All other systems reviewed and are negative.    Pertinent Medical History   Medical History Reviewed by provider this encounter:       Objective     There were no vitals taken for this visit.    Physical Exam  Vitals and nursing note reviewed.   Constitutional:       General: He is not in acute distress.     Appearance: Normal appearance. He is not ill-appearing or diaphoretic.   HENT:      Head: Normocephalic and atraumatic.      Mouth/Throat:      Mouth: Mucous membranes are moist.      Pharynx: Oropharynx is clear.   Eyes:      Extraocular Movements: Extraocular movements intact.   Cardiovascular:      Rate and Rhythm: Normal rate and regular rhythm.   Pulmonary:      Effort: Pulmonary effort is normal. No respiratory distress.   Abdominal:      General: Abdomen is flat. There is no distension.      Palpations: Abdomen is soft.      Tenderness: There is abdominal tenderness (around RUQ SYMONE). There is no guarding or rebound.      Comments: Midline wound with packing in place. Healing well. Inferior portion with wound edges well approximated and healed, staples removed.  Packing placed in superior aspect but wound with well healing granulation tissue.  Prior IR drain site in RLQ with fibrinous tissue     IR drain in place with some irritation around skin, no acute sign of infection. Drain output purulent   Musculoskeletal:         General: Normal range of motion.      Cervical back: Normal range of motion.   Skin:     General: Skin is warm and dry.   Neurological:      Mental Status: He is alert.   Psychiatric:         Mood and Affect: Mood normal.         Behavior: Behavior normal.         Thought Content: Thought content normal.         Judgment:  Judgment normal.       Administrative Statements

## 2024-06-19 ENCOUNTER — HOSPITAL ENCOUNTER (OUTPATIENT)
Dept: RADIOLOGY | Facility: HOSPITAL | Age: 39
Discharge: HOME | End: 2024-06-19
Attending: INTERNAL MEDICINE

## 2024-06-19 ENCOUNTER — TELEPHONE (OUTPATIENT)
Age: 39
End: 2024-06-19

## 2024-06-19 DIAGNOSIS — K75.0 LIVER ABSCESS: ICD-10-CM

## 2024-06-19 PROCEDURE — 74177 CT ABD & PELVIS W/CONTRAST: CPT

## 2024-06-19 RX ADMIN — IOHEXOL 80 ML: 350 INJECTION, SOLUTION INTRAVENOUS at 13:55

## 2024-06-19 NOTE — TELEPHONE ENCOUNTER
Pt was looking to be seen at our [place] office, I advised pt we do not accept self-pay for office visits, due to the cost of the procedures. Pt understood.

## 2024-06-24 ENCOUNTER — OFFICE VISIT (OUTPATIENT)
Dept: SURGERY | Facility: CLINIC | Age: 39
End: 2024-06-24

## 2024-06-24 VITALS
BODY MASS INDEX: 35.75 KG/M2 | WEIGHT: 249.7 LBS | RESPIRATION RATE: 14 BRPM | TEMPERATURE: 97.9 F | DIASTOLIC BLOOD PRESSURE: 76 MMHG | HEIGHT: 70 IN | OXYGEN SATURATION: 96 % | HEART RATE: 101 BPM | SYSTOLIC BLOOD PRESSURE: 113 MMHG

## 2024-06-24 DIAGNOSIS — K75.0 LIVER ABSCESS: Primary | ICD-10-CM

## 2024-06-24 LAB — FUNGUS SPEC CULT: NORMAL

## 2024-06-24 PROCEDURE — 99213 OFFICE O/P EST LOW 20 MIN: CPT | Performed by: SURGERY

## 2024-06-24 NOTE — PROGRESS NOTES
Ambulatory Visit  Name: Diogenes Mullins      : 1985      MRN: [id number]  Encounter Provider: General Surgery Generic Physician Nima  Encounter Date: 2024   Encounter department: [facility] GENERAL SURGERY [place]    Assessment & Plan   1. Liver abscess  Assessment & Plan:  39-year-old male with recent history of purulent peritonitis secondary to perforated liver abscess status post diagnostic laparoscopy converted to exploratory laparotomy, drainage of hepatic abscess, four-quadrant lavage and placement of multiple drains, course complicated by recurrent abscess requiring IR drainage on .  Surgical drains now removed just remains with 1 IR drain in liver abscess.  Status post IR drain check on  with persistent cavity so left in place.    Plan:  Midline wound appears to be healing well, small area of granulation tissue in the middle aspect of the wound, now at skin level, no need for further packing, remainder of staples removed in the office today, follow-up in 2 weeks for wound check to confirm complete healing  Follow-up with IR later this week for drain check and possible IR drain removal  Follow-up with ID later this week, recent CT scan showed interval decrease in size of liver abscess cavity, initially planning for Augmentin twice daily until radiologic resolution of the abscess, follow-up for consideration of discontinuation of antibiotics      History of Present Illness     Diogenes Mullins is a 39 y.o. male with recent history of purulent peritonitis secondary to perforated liver abscess status post diagnostic laparoscopy converted to exploratory laparotomy, drainage of hepatic abscess, four-quadrant lavage and placement of multiple drains, course complicated by recurrent abscess requiring IR drainage on .  His surgical drains have been removed.  Now remains with just 1 IR drain in place.  This continues to put out approximately 5 to 10 cc of purulent drainage per day.  He has  been flushing but has been noticing some pain with flushes so has decreased the volume with each flush.  He is tolerating a regular diet without nausea or vomiting.  He is having bowel function.  Some residual pain at his drain site but this has been improving.  He was previously given a referral to the acute pain service for ongoing management of his chronic pain however he did not feel this was necessary.  Underwent CT abdomen pelvis with IV contrast on 6/19 that showed interval decrease in size of his liver abscess cavity.  Reports he has an appointment with IR for drain check on Thursday and an appointment with the infectious disease doctors on Wednesday.    Review of Systems   Constitutional: Negative.    HENT: Negative.     Eyes: Negative.    Respiratory: Negative.     Cardiovascular: Negative.    Gastrointestinal:  Positive for abdominal pain (Angella-incisional, improving). Negative for nausea and vomiting.   Endocrine: Negative.    Genitourinary: Negative.    Musculoskeletal: Negative.    Skin:  Positive for wound (Midline surgical wound healing well).   Neurological: Negative.    Psychiatric/Behavioral: Negative.       Pertinent Medical History       Past Medical History   History reviewed. No pertinent past medical history.  Past Surgical History:   Procedure Laterality Date    IR CHEST TUBE PLACEMENT  5/31/2024    IR DRAINAGE TUBE CHECK/CHANGE/REPOSITION/REINSERTION/UPSIZE  5/31/2024    IR DRAINAGE TUBE CHECK/CHANGE/REPOSITION/REINSERTION/UPSIZE  6/12/2024    IR DRAINAGE TUBE PLACEMENT  5/26/2024    [place] LAPS ABD PRTM&OMENTUM DX W/WO SPEC BR/WA SPX N/A 5/22/2024    Procedure: DIAGNOSTIC LAPAROSCOPY CONVERTED TO EXPLORATORY LAPAROTOMY, DRAINAGE OF HEPATIC ABSCESS, 4 QUADRANT PERITONEAL LAVAGE, PLACEMENT OF DRAINS.;  Surgeon: Isauro Clifton DO;  Location: [facility];  Service: General     History reviewed. No pertinent family history.  Current Outpatient Medications on File Prior to Visit   Medication  Sig Dispense Refill    amoxicillin-clavulanate (AUGMENTIN) 875-125 mg per tablet Take 1 tablet by mouth every 12 (twelve) hours for 21 days 42 tablet 0    gabapentin (Neurontin) 100 mg capsule Take 1 capsule (100 mg total) by mouth 3 (three) times a day 21 capsule 0    methocarbamol (ROBAXIN) 500 mg tablet Take 1 tablet (500 mg total) by mouth 4 (four) times a day 28 tablet 0    naloxone (NARCAN) 4 mg/0.1 mL nasal spray Administer 1 spray into a nostril. If no response after 2-3 minutes, give another dose in the other nostril using a new spray. 1 each 1    pantoprazole (PROTONIX) 40 mg tablet Take 1 tablet (40 mg total) by mouth 2 (two) times a day before meals 60 tablet 0    sodium chloride, PF, 0.9 % 10 mL by Intracatheter route daily for 120 doses Intracatheter flushing daily. May substitute prefilled syringe with normal saline 10 mL vials, 10 mL syringes, and 18 g blunt needles 300 mL 3     No current facility-administered medications on file prior to visit.   No Known Allergies   Current Outpatient Medications on File Prior to Visit   Medication Sig Dispense Refill    amoxicillin-clavulanate (AUGMENTIN) 875-125 mg per tablet Take 1 tablet by mouth every 12 (twelve) hours for 21 days 42 tablet 0    gabapentin (Neurontin) 100 mg capsule Take 1 capsule (100 mg total) by mouth 3 (three) times a day 21 capsule 0    methocarbamol (ROBAXIN) 500 mg tablet Take 1 tablet (500 mg total) by mouth 4 (four) times a day 28 tablet 0    naloxone (NARCAN) 4 mg/0.1 mL nasal spray Administer 1 spray into a nostril. If no response after 2-3 minutes, give another dose in the other nostril using a new spray. 1 each 1    pantoprazole (PROTONIX) 40 mg tablet Take 1 tablet (40 mg total) by mouth 2 (two) times a day before meals 60 tablet 0    sodium chloride, PF, 0.9 % 10 mL by Intracatheter route daily for 120 doses Intracatheter flushing daily. May substitute prefilled syringe with normal saline 10 mL vials, 10 mL syringes, and 18 g  "blunt needles 300 mL 3     No current facility-administered medications on file prior to visit.      Social History     Tobacco Use    Smoking status: Former     Types: Cigarettes    Smokeless tobacco: Never   Vaping Use    Vaping status: Former   Substance and Sexual Activity    Alcohol use: Not Currently    Drug use: Never    Sexual activity: Not Currently     Objective     /76   Pulse 101   Temp 97.9 °F (36.6 °C) (Temporal)   Resp 14   Ht 5' 10\" (1.778 m)   Wt 113 kg (249 lb 11.2 oz)   SpO2 96%   BMI 35.83 kg/m²     Physical Exam  Constitutional:       Appearance: Normal appearance.   HENT:      Head: Normocephalic and atraumatic.      Right Ear: External ear normal.      Left Ear: External ear normal.      Nose: Nose normal.      Mouth/Throat:      Mouth: Mucous membranes are moist.      Pharynx: Oropharynx is clear.   Eyes:      Extraocular Movements: Extraocular movements intact.      Conjunctiva/sclera: Conjunctivae normal.      Pupils: Pupils are equal, round, and reactive to light.   Cardiovascular:      Rate and Rhythm: Normal rate and regular rhythm.      Pulses: Normal pulses.   Pulmonary:      Effort: Pulmonary effort is normal.   Abdominal:      General: Abdomen is flat.      Palpations: Abdomen is soft.      Tenderness: There is abdominal tenderness (Minimally tender around drain site).      Comments: Midline incision with 3 to 4 cm open area at middle aspect, healthy granulation tissue, staples removed, dressed with ABD, see photo below   Musculoskeletal:         General: Normal range of motion.      Cervical back: Normal range of motion.   Skin:     General: Skin is warm and dry.   Neurological:      Mental Status: He is alert.   Psychiatric:         Mood and Affect: Mood normal.         Behavior: Behavior normal.           Administrative Statements           "

## 2024-06-24 NOTE — ASSESSMENT & PLAN NOTE
39-year-old male with recent history of purulent peritonitis secondary to perforated liver abscess status post diagnostic laparoscopy converted to exploratory laparotomy, drainage of hepatic abscess, four-quadrant lavage and placement of multiple drains, course complicated by recurrent abscess requiring IR drainage on 5/26.  Surgical drains now removed just remains with 1 IR drain in liver abscess.  Status post IR drain check on 6/12 with persistent cavity so left in place.    Plan:  Midline wound appears to be healing well, small area of granulation tissue in the middle aspect of the wound, now at skin level, no need for further packing, remainder of staples removed in the office today, follow-up in 2 weeks for wound check to confirm complete healing  Follow-up with IR later this week for drain check and possible IR drain removal  Follow-up with ID later this week, recent CT scan showed interval decrease in size of liver abscess cavity, initially planning for Augmentin twice daily until radiologic resolution of the abscess, follow-up for consideration of discontinuation of antibiotics

## 2024-06-26 ENCOUNTER — OFFICE VISIT (OUTPATIENT)
Dept: INFECTIOUS DISEASES | Facility: CLINIC | Age: 39
End: 2024-06-26

## 2024-06-26 VITALS
SYSTOLIC BLOOD PRESSURE: 120 MMHG | TEMPERATURE: 97.7 F | WEIGHT: 208.2 LBS | RESPIRATION RATE: 16 BRPM | DIASTOLIC BLOOD PRESSURE: 80 MMHG | HEART RATE: 98 BPM | HEIGHT: 70 IN | BODY MASS INDEX: 29.81 KG/M2 | OXYGEN SATURATION: 97 %

## 2024-06-26 DIAGNOSIS — R79.89 ELEVATED LFTS: ICD-10-CM

## 2024-06-26 DIAGNOSIS — K75.0 LIVER ABSCESS: Primary | ICD-10-CM

## 2024-06-26 PROCEDURE — 99213 OFFICE O/P EST LOW 20 MIN: CPT | Performed by: INTERNAL MEDICINE

## 2024-06-26 RX ORDER — AMOXICILLIN AND CLAVULANATE POTASSIUM 875; 125 MG/1; MG/1
1 TABLET, FILM COATED ORAL EVERY 12 HOURS SCHEDULED
Qty: 60 TABLET | Refills: 1 | Status: SHIPPED | OUTPATIENT
Start: 2024-06-26 | End: 2024-07-26

## 2024-06-26 RX ORDER — ACETAMINOPHEN 500 MG
1000 TABLET ORAL EVERY 6 HOURS PRN
COMMUNITY

## 2024-06-26 NOTE — PATIENT INSTRUCTIONS
-Continue augmentin.  -Check CBCD, CMP every other week while on oral antibiotic  -Follow up with IR  -If drain is removed, we can stop medication.  -Follow up in 3-4 weeks, pending you remain on the medication.  -Call us in the interim with questions/concerns.  -Try to establish with a PCP for continued care                                    [facility] [phone]                                          [email or web address]

## 2024-06-26 NOTE — PROGRESS NOTES
Progress Note - Infectious Disease   Diogenes Mullins 39 y.o. male MRN: [id number]  Unit/Bed#:  Encounter: [id number]      Impression/Recommendations:  1.  Perforated liver abscess with purulent peritonitis.  Unclear etiology, risk factors. Consider biliary anatomic abnormality versus intraluminal GI lesion.  Abdominal CT with no other acute abnormalities including evidence of malignancy. RUQ US with no biliary abnormalities.  No FH of GI malignancy.  Status post exploratory laparotomy with drainage of abscess and four-quadrant peritoneal lavage 5/22. Intraoperative findings included extensive four-quadrant purulent peritonitis with near frozen abdomen due to inflammation.  OR cultures with Group F Strep (unusual biliary pathogen), prevotella.  Pathology negative for malignancy.  Repeat CT showed persistent abscess. Status post IR drain 5/26 yielding 270 cc pus.  Culture again with growth of group F strep and anaerobes.  EGD with single ulcer in stomach and colonoscopy showed 1 polyp in the ascending colon as well as edematous mucosa in cecum.  Pathology was negative.  Patient received 2 weeks of IV antibiotic during hospitalization.  Antibiotic was changed to oral Augmentin with plan to continue until radiologic resolution.  Most recent CT reviewed with improving hepatic collection with drain in place, without any new collections.  Patient is tolerating oral antibiotic without difficulty.  Most recent labs reviewed.  Discussed with patient risk versus benefits of continuing antibiotic.  He would prefer to remain on antibiotic until collection resolves/drain is removed.     -Continue Augmentin for now.  Refill provided.  -Ongoing drain management per IR  -Plan for discontinuation of antibiotic once collection resolves/drain is removed.  -Follow-up in 3 to 4 weeks  -Check CBCD, CMP every other week while on oral antibiotic  -Outpatient surgery follow-up ongoing     2.  Elevated LFTs.  In the setting of #1.  No  radiologic evidence of biliary obstruction.  LFTs have since normalized.  Continue to monitor while on antibiotic.     I discussed above plan in detail with patient and answered all questions.        Subjective:  Patient is here for hospital follow-up regarding recent perforated liver abscess with purulent peritonitis.  He was discharged home with 1 drain remaining in hepatic abscess.  He is still taking the Augmentin which he feels that he is tolerating without issue.  He continues to feel better feel like he is recovering.  He was seen by surgery recently and doing well from their standpoint.  He has another appointment with IR tomorrow.  Drain is still putting out some cloudy output on a consistent basis.  No fevers or chills.    Reviewed and updated as appropriate: Past medical history, past surgical history, social history, current medications, allergies, problem list.    Objective:  Vitals:  [unfilled]  @TMAX(24)@Current: Temperature: 97.7 °F (36.5 °C)    Physical Exam:   General:  Well-nourished, well-developed, in no acute distress  Eyes:  Conjunctive clear with no hemorrhages or effusions  Oropharynx:  No visible oral lesions  Neck: Trachea midline  Lungs: Nonlabored respirations  Abdomen: Midline incision is healing well with no dehiscence, surrounding erythema or visible drainage.  Right upper quadrant drain in place with cloudy output in bulb  Extremities:  No peripheral cyanosis, clubbing, or edema  Skin:  No visible rashes or draining wounds  Neurological:  Moves all four extremities spontaneously    Lab Results:  I have personally reviewed pertinent labs.  Most recent CBC/CMP from 6/17/2024 reviewed.    Imaging Studies:   I have personally reviewed pertinent imaging study reports and images in PACS.    Most recent CT abdomen/pelvis, personally reviewed, from 6/19/2024 shows continued interval decrease in size of right hepatic lobe abscess with drain in place.  No new collections.    EKG, Pathology,  and Other Studies:   I have personally reviewed pertinent reports.

## 2024-06-27 ENCOUNTER — TELEPHONE (OUTPATIENT)
Age: 39
End: 2024-06-27

## 2024-06-27 NOTE — TELEPHONE ENCOUNTER
Patient called to reschedule 07/08/2024 appointment to 7/12/2024. Advised patient is no available appointment 07/12/2024. Patient will stay with his 7/08/2024 at 11:00 am appointment.

## 2024-07-02 RX ORDER — OXYCODONE HYDROCHLORIDE 5 MG/1
5 TABLET ORAL EVERY 4 HOURS PRN
Qty: 12 TABLET | Refills: 0 | OUTPATIENT
Start: 2024-07-02 | End: 2024-07-05

## 2024-07-05 ENCOUNTER — HOSPITAL ENCOUNTER (OUTPATIENT)
Dept: NON INVASIVE DIAGNOSTICS | Facility: HOSPITAL | Age: 39
Discharge: HOME | End: 2024-07-05
Attending: STUDENT IN AN ORGANIZED HEALTH CARE EDUCATION/TRAINING PROGRAM

## 2024-07-05 DIAGNOSIS — K75.0 LIVER ABSCESS: ICD-10-CM

## 2024-07-05 PROCEDURE — 76080 X-RAY EXAM OF FISTULA: CPT

## 2024-07-05 PROCEDURE — 49424 ASSESS CYST CONTRAST INJECT: CPT

## 2024-07-05 PROCEDURE — C1769 GUIDE WIRE: HCPCS

## 2024-07-05 NOTE — SEDATION DOCUMENTATION
Contrast injected with images reviewed by Dr. Le, contrast coming out by skin so will have to see if occluded or needs another tube exchanged at right abdomen

## 2024-07-19 ENCOUNTER — TELEPHONE (OUTPATIENT)
Dept: INFECTIOUS DISEASES | Facility: CLINIC | Age: 39
End: 2024-07-19

## 2024-07-19 NOTE — TELEPHONE ENCOUNTER
Called and spoke with patient today.   Reminded patient of his upcoming appointment and to have labs done prior to appointment.   Patient states he is still taking the Augmentin at this time. Patient states he had drains removed by IR on 7/5/24. Patient would like to know if he should keep his appointment or if it can be cancelled.

## 2024-07-19 NOTE — TELEPHONE ENCOUNTER
Tried to call patient back today regarding follow up appointment. Unable to reach patient as phone just rings and no voicemail picks up to leave a message.

## 2024-07-23 ENCOUNTER — TELEPHONE (OUTPATIENT)
Dept: INFECTIOUS DISEASES | Facility: CLINIC | Age: 39
End: 2024-07-23

## 2024-07-23 NOTE — TELEPHONE ENCOUNTER
Contacted patient as he has not yet arrived for 12:30 appt. He had expressed that he had tried to cancel the appointment, he stated his drain came out and that Dr. Cerrato wanted him just to finish the antibiotics and stop and that no appointment was needed. Patient with only three days of abx left, will finish his course and then stop.  He is aware no ID f/u needed.

## 2024-09-19 ENCOUNTER — TELEPHONE (OUTPATIENT)
Age: 39
End: 2024-09-19

## 2024-09-19 NOTE — TELEPHONE ENCOUNTER
Pt of Dr. Clifton s/p liver abscess sx 5/22-    Pt states drains were removed in July and was healing fine and becoming more active.     Pt states 4 days ago he noticed his right upper abdomen has been bulging out slightly more than the left side.   Denies pain or fever. Denies N/V/D/Constipation.     He is unsure if this is normal, gas, or unusual.   He would like a provider appointment.   Offered next available appointment 10/3.     Pt would like a call back pertaining to acquiring any labs or imaging for this appointment as soon as possible.

## 2024-09-19 NOTE — TELEPHONE ENCOUNTER
Patient has a history of liver abscess and surgery a few months ago. Has been cleared by Gen Surg and infectious disease, but since Bob 9/15/24 has had significant protrusion in right abdomen with discomfort. Made a warm transfer to [facility].

## 2024-09-19 NOTE — TELEPHONE ENCOUNTER
Patient called back as he forgot to mention he has a history of a stomach ulcer. He is quite concerned and would like a sooner appointment if at all possible.

## 2024-09-23 ENCOUNTER — OFFICE VISIT (OUTPATIENT)
Dept: SURGERY | Facility: CLINIC | Age: 39
End: 2024-09-23
Payer: COMMERCIAL

## 2024-09-23 ENCOUNTER — HOSPITAL ENCOUNTER (OUTPATIENT)
Dept: CT IMAGING | Facility: HOSPITAL | Age: 39
Discharge: HOME | End: 2024-09-23
Payer: COMMERCIAL

## 2024-09-23 DIAGNOSIS — K43.9 VENTRAL HERNIA: ICD-10-CM

## 2024-09-23 DIAGNOSIS — K43.9 VENTRAL HERNIA: Primary | ICD-10-CM

## 2024-09-23 PROCEDURE — 74177 CT ABD & PELVIS W/CONTRAST: CPT

## 2024-09-23 PROCEDURE — 99212 OFFICE O/P EST SF 10 MIN: CPT | Performed by: SURGERY

## 2024-09-23 RX ADMIN — IOHEXOL 100 ML: 350 INJECTION, SOLUTION INTRAVENOUS at 15:24

## 2024-09-23 NOTE — ASSESSMENT & PLAN NOTE
r/o RUQ ventral hernia    Plan   -CT A/P today  -Will call patient later today to discuss CT findings and appropriate follow up   Orders:    CT abdomen pelvis w contrast; Future

## 2024-09-23 NOTE — PROGRESS NOTES
Ambulatory Visit  Name: Diogenes Mullins      : 1985      MRN: [id number]  Encounter Provider: General Surgery Generic Physician Nima  Encounter Date: 2024   Encounter department: [facility] [place]    Assessment & Plan      History of Present Illness   {Disappearing Hyperlinks I Encounters * My Last Note * Since Last Visit * History :72438}  Diogenes Mullins is a 39 y.o. male who presents ***    {History obtained from (Optional):31037}  Review of Systems   Unable to perform ROS: Acuity of condition     {Select to Display PMH (Optional):79927}      Objective   {Disappearing Hyperlinks   Review Vitals * Enter New Vitals * Results Review * Labs * Imaging * Cardiology * Procedures * Lung Cancer Screening * Surgical eConsent :75277}  There were no vitals taken for this visit.    Physical Exam  Administrative Statements {Disappearing Hyperlinks I  Level of Service * PCMH/PCSP:29979}  I have spent a total time of 20 minutes in caring for this patient on the day of the visit/encounter including Diagnostic results, Prognosis, Risks and benefits of tx options, Instructions for management, Patient and family education, and Importance of tx compliance.

## 2024-09-23 NOTE — PROGRESS NOTES
"Ambulatory Visit  Name: Diogenes Mullins      : 1985      MRN: [id number]  Encounter Provider: General Surgery Generic Physician Nima  Encounter Date: 2024   [facility]    Assessment & Plan  Ventral hernia  r/o RUQ ventral hernia    Plan   -CT A/P today  -Will call patient later today to discuss CT findings and appropriate follow up   Orders:    CT abdomen pelvis w contrast; Future      History of Present Illness     Diogenes Mullins is a 39 y.o. male who presents with \"bugle\" on his RUQ next to a well healed midline incision s/p exploratory laparotomy 24 for a perforated liver abscess with peritonitis. He experiences 2/10 pain and mild discomfort after exercising (crunches) and throwing the ball around with his son. Patient denies nausea, vomiting, fevers and chills. Patient denies changes in his bowel habits. Patient has not noticed any skin changes.     History obtained from : patient  Review of Systems   Constitutional:  Negative for activity change, appetite change, fever and unexpected weight change.   Respiratory:  Negative for chest tightness and shortness of breath.    Cardiovascular:  Negative for chest pain and palpitations.   Gastrointestinal:  Negative for abdominal distention, abdominal pain, nausea and vomiting.   Skin:  Negative for color change and rash.     Pertinent Medical History       Past Medical History   No past medical history on file.  Past Surgical History:   Procedure Laterality Date    IR CHEST TUBE PLACEMENT  2024    IR DRAINAGE TUBE CHECK/CHANGE/REPOSITION/REINSERTION/UPSIZE  2024    IR DRAINAGE TUBE CHECK/CHANGE/REPOSITION/REINSERTION/UPSIZE  2024    IR DRAINAGE TUBE CHECK/CHANGE/REPOSITION/REINSERTION/UPSIZE  2024    IR DRAINAGE TUBE PLACEMENT  2024    AL LAPS ABD PRTM&OMENTUM DX W/WO SPEC BR/WA SPX N/A 2024    Procedure: DIAGNOSTIC LAPAROSCOPY CONVERTED TO EXPLORATORY LAPAROTOMY, " DRAINAGE OF HEPATIC ABSCESS, 4 QUADRANT PERITONEAL LAVAGE, PLACEMENT OF DRAINS.;  Surgeon: Isauro Clifton DO;  Location: [facility];  Service: General     No family history on file.  Current Outpatient Medications on File Prior to Visit   Medication Sig Dispense Refill    acetaminophen (TYLENOL) 500 mg tablet Take 1,000 mg by mouth every 6 (six) hours as needed for mild pain      gabapentin (Neurontin) 100 mg capsule Take 1 capsule (100 mg total) by mouth 3 (three) times a day (Patient not taking: Reported on 6/26/2024) 21 capsule 0    methocarbamol (ROBAXIN) 500 mg tablet Take 1 tablet (500 mg total) by mouth 4 (four) times a day (Patient not taking: Reported on 6/26/2024) 28 tablet 0    naloxone (NARCAN) 4 mg/0.1 mL nasal spray Administer 1 spray into a nostril. If no response after 2-3 minutes, give another dose in the other nostril using a new spray. 1 each 1    pantoprazole (PROTONIX) 40 mg tablet Take 1 tablet (40 mg total) by mouth 2 (two) times a day before meals 60 tablet 0    sodium chloride, PF, 0.9 % 10 mL by Intracatheter route daily for 120 doses Intracatheter flushing daily. May substitute prefilled syringe with normal saline 10 mL vials, 10 mL syringes, and 18 g blunt needles 300 mL 3     No current facility-administered medications on file prior to visit.   No Known Allergies   Current Outpatient Medications on File Prior to Visit   Medication Sig Dispense Refill    acetaminophen (TYLENOL) 500 mg tablet Take 1,000 mg by mouth every 6 (six) hours as needed for mild pain      gabapentin (Neurontin) 100 mg capsule Take 1 capsule (100 mg total) by mouth 3 (three) times a day (Patient not taking: Reported on 6/26/2024) 21 capsule 0    methocarbamol (ROBAXIN) 500 mg tablet Take 1 tablet (500 mg total) by mouth 4 (four) times a day (Patient not taking: Reported on 6/26/2024) 28 tablet 0    naloxone (NARCAN) 4 mg/0.1 mL nasal spray Administer 1 spray into a nostril. If no response after 2-3  minutes, give another dose in the other nostril using a new spray. 1 each 1    pantoprazole (PROTONIX) 40 mg tablet Take 1 tablet (40 mg total) by mouth 2 (two) times a day before meals 60 tablet 0    sodium chloride, PF, 0.9 % 10 mL by Intracatheter route daily for 120 doses Intracatheter flushing daily. May substitute prefilled syringe with normal saline 10 mL vials, 10 mL syringes, and 18 g blunt needles 300 mL 3     No current facility-administered medications on file prior to visit.      Social History     Tobacco Use    Smoking status: Former     Types: Cigarettes    Smokeless tobacco: Never   Vaping Use    Vaping status: Former   Substance and Sexual Activity    Alcohol use: Not Currently    Drug use: Never    Sexual activity: Not Currently         Objective     There were no vitals taken for this visit.    Physical Exam  Constitutional:       Appearance: Normal appearance. He is normal weight.   HENT:      Head: Normocephalic and atraumatic.   Eyes:      Extraocular Movements: Extraocular movements intact.   Abdominal:      General: Abdomen is flat. There is no distension.      Palpations: Abdomen is soft.      Tenderness: There is no abdominal tenderness. There is no guarding or rebound.      Hernia: A hernia (RUQ) is present.   Musculoskeletal:      Cervical back: Normal range of motion.   Neurological:      Mental Status: He is alert. Mental status is at baseline.   Psychiatric:         Mood and Affect: Mood normal.       Administrative Statements   I have spent a total time of 20 minutes in caring for this patient on the day of the visit/encounter including Diagnostic results, Prognosis, Patient and family education, and Obtaining or reviewing history  .

## 2024-10-10 ENCOUNTER — OFFICE VISIT (OUTPATIENT)
Dept: SURGERY | Facility: CLINIC | Age: 39
End: 2024-10-10
Payer: COMMERCIAL

## 2024-10-10 VITALS
WEIGHT: 210.2 LBS | BODY MASS INDEX: 30.09 KG/M2 | RESPIRATION RATE: 16 BRPM | OXYGEN SATURATION: 97 % | TEMPERATURE: 97.4 F | HEART RATE: 84 BPM | HEIGHT: 70 IN

## 2024-10-10 DIAGNOSIS — K43.9 VENTRAL HERNIA WITHOUT OBSTRUCTION OR GANGRENE: ICD-10-CM

## 2024-10-10 DIAGNOSIS — K76.9 LIVER LESION: Primary | ICD-10-CM

## 2024-10-10 PROCEDURE — 99212 OFFICE O/P EST SF 10 MIN: CPT | Performed by: SURGERY

## 2024-10-14 PROBLEM — K76.9 LIVER LESION: Status: ACTIVE | Noted: 2024-10-14

## 2024-10-14 NOTE — PROGRESS NOTES
Office Visit - General Surgery  Diogenes Mullins MRN: [id number]  Encounter: [id number]  Assessment & Plan    Problem List Items Addressed This Visit          Digestive    Liver lesion - Primary    Relevant Orders    MRI abdomen w wo contrast       Other    Ventral hernia     - CT scan reviewed with patient   - MRI to evaluate liver lesion  - F/U after MRI to discuss elective ventral hernia repair if desired            Subjective    Diogenes Mullins is a 39 y.o. male who presents to the office to review CT results. No complaints, no f/c/n/v.     Pt  has no past medical history on file.    Pt  has a past surgical history that includes pr laps abd prtm&omentum dx w/wo spec br/wa spx (N/A, 5/22/2024); IR drainage tube placement (5/26/2024); IR drainage tube check/change/reposition/reinsertion/upsize (5/31/2024); IR chest tube placement (5/31/2024); IR drainage tube check/change/reposition/reinsertion/upsize (6/12/2024); and IR drainage tube check/change/reposition/reinsertion/upsize (7/5/2024).    family history is not on file.    Diogenes Mullins reports that he has quit smoking. His smoking use included cigarettes. He has never used smokeless tobacco. He reports that he does not currently use alcohol. He reports that he does not use drugs.      Current Outpatient Medications on File Prior to Visit   Medication Sig Dispense Refill    acetaminophen (TYLENOL) 500 mg tablet Take 1,000 mg by mouth every 6 (six) hours as needed for mild pain      gabapentin (Neurontin) 100 mg capsule Take 1 capsule (100 mg total) by mouth 3 (three) times a day (Patient not taking: Reported on 6/26/2024) 21 capsule 0    methocarbamol (ROBAXIN) 500 mg tablet Take 1 tablet (500 mg total) by mouth 4 (four) times a day (Patient not taking: Reported on 6/26/2024) 28 tablet 0    naloxone (NARCAN) 4 mg/0.1 mL nasal spray Administer 1 spray into a nostril. If no response after 2-3 minutes, give another dose in the other nostril using a new spray. 1 each 1     pantoprazole (PROTONIX) 40 mg tablet Take 1 tablet (40 mg total) by mouth 2 (two) times a day before meals 60 tablet 0    sodium chloride, PF, 0.9 % 10 mL by Intracatheter route daily for 120 doses Intracatheter flushing daily. May substitute prefilled syringe with normal saline 10 mL vials, 10 mL syringes, and 18 g blunt needles 300 mL 3     No current facility-administered medications on file prior to visit.     No Known Allergies    Review of Systems   Constitutional: Negative.    HENT: Negative.     Respiratory: Negative.     Cardiovascular: Negative.    Gastrointestinal: Negative.    Musculoskeletal: Negative.    All other systems reviewed and are negative.      Objective    Vitals:    10/10/24 1114   Pulse: 84   Resp: 16   Temp: (!) 97.4 °F (36.3 °C)   SpO2: 97%       Physical Exam  Constitutional:       Appearance: Normal appearance. He is normal weight.   Cardiovascular:      Rate and Rhythm: Normal rate.   Pulmonary:      Effort: Pulmonary effort is normal.   Abdominal:      General: There is no distension.      Palpations: Abdomen is soft.      Tenderness: There is no abdominal tenderness. There is no guarding or rebound.      Hernia: A hernia is present.      Comments: Ventral hernia - reducible, nontender, no overlying skin changes   Neurological:      Mental Status: He is alert.

## 2024-10-14 NOTE — ASSESSMENT & PLAN NOTE
- CT scan reviewed with patient   - MRI to evaluate liver lesion  - F/U after MRI to discuss elective ventral hernia repair if desired

## 2024-10-23 ENCOUNTER — HOSPITAL ENCOUNTER (OUTPATIENT)
Dept: MRI IMAGING | Facility: HOSPITAL | Age: 39
Discharge: HOME | End: 2024-10-23
Attending: SURGERY
Payer: COMMERCIAL

## 2024-10-23 DIAGNOSIS — K76.9 LIVER LESION: ICD-10-CM

## 2024-10-23 PROCEDURE — 74183 MRI ABD W/O CNTR FLWD CNTR: CPT

## 2024-10-23 PROCEDURE — A9585 GADOBUTROL INJECTION: HCPCS | Performed by: SURGERY

## 2024-10-23 RX ORDER — GADOBUTROL 604.72 MG/ML
9 INJECTION INTRAVENOUS
Status: COMPLETED | OUTPATIENT
Start: 2024-10-23 | End: 2024-10-23

## 2024-10-23 RX ADMIN — GADOBUTROL 9 ML: 604.72 INJECTION INTRAVENOUS at 14:38

## 2024-11-12 ENCOUNTER — APPOINTMENT (RX ONLY)
Dept: URBAN - NONMETROPOLITAN AREA CLINIC 4 | Facility: CLINIC | Age: 39
Setting detail: DERMATOLOGY
End: 2024-11-12

## 2024-11-12 DIAGNOSIS — A63.0 ANOGENITAL (VENEREAL) WARTS: ICD-10-CM | Status: INADEQUATELY CONTROLLED

## 2024-11-12 PROCEDURE — 17110 DESTRUCTION B9 LES UP TO 14: CPT

## 2024-11-12 PROCEDURE — ? LIQUID NITROGEN

## 2024-11-12 PROCEDURE — ? COUNSELING

## 2024-11-12 ASSESSMENT — LOCATION ZONE DERM: LOCATION ZONE: PENIS

## 2024-11-12 ASSESSMENT — LOCATION SIMPLE DESCRIPTION DERM: LOCATION SIMPLE: PENIS

## 2024-11-12 ASSESSMENT — LOCATION DETAILED DESCRIPTION DERM: LOCATION DETAILED: RIGHT DORSAL SHAFT OF PENIS

## 2024-11-12 ASSESSMENT — TOTAL NUMBER OF CONDYLOMA: # OF LESIONS?: 1

## 2024-11-12 NOTE — HPI: SKIN LESION
What Type Of Note Output Would You Prefer (Optional)?: Standard Output
How Severe Is Your Skin Lesion?: mild
[id number]: treated_been_treated
Is This A New Presentation, Or A Follow-Up?: Skin Lesions
When Was It Treated?: 2021
Which Family Member (Optional)?: Mother

## 2024-11-12 NOTE — PROCEDURE: LIQUID NITROGEN
Consent: The patient's consent was obtained including but not limited to risks of crusting, scabbing, blistering, scarring, darker or lighter pigmentary change, recurrence, incomplete removal and infection.
Add 52 Modifier (Optional): no
Spray Paint Text: The liquid nitrogen was applied to the skin utilizing a spray paint frosting technique.
Number Of Freeze-Thaw Cycles: 1 freeze-thaw cycle
Show Aperture Variable?: Yes
Medical Necessity Information: It is in your best interest to select a reason for this procedure from the list below. All of these items fulfill various CMS LCD requirements except the new and changing color options.
Medical Necessity Clause: This procedure was medically necessary because the lesions that were treated were:
Post-Care Instructions: I reviewed with the patient in detail post-care instructions. Patient is to wear sunprotection, and avoid picking at any of the treated lesions. Pt may apply Vaseline to crusted or scabbing areas.
Duration Of Freeze Thaw-Cycle (Seconds): 5-10
Detail Level: Zone

## (undated) DEVICE — SUT MONOCRYL 4-0 PS-2 18 IN Y496G

## (undated) DEVICE — TROCAR: Brand: KII FIOS FIRST ENTRY

## (undated) DEVICE — ADHESIVE SKIN HIGH VISCOSITY EXOFIN 1ML

## (undated) DEVICE — INSUFFLATION NEEDLE TO ESTABLISH PNEUMOPERITONEUM.: Brand: INSUFFLATION NEEDLE

## (undated) DEVICE — TRAP,MUCUS SPECIMEN, 80CC: Brand: MEDLINE

## (undated) DEVICE — ELECTRODE LAP L WIRE E-Z CLEAN 33CM -0100

## (undated) DEVICE — PACK PBDS LAP CHOLE RF

## (undated) DEVICE — GLOVE INDICATOR PI UNDERGLOVE SZ 7.5 BLUE

## (undated) DEVICE — TELFA ADHESIVE ISLAND DRESSING: Brand: TELFA

## (undated) DEVICE — TRAY FOLEY 16FR URIMETER SURESTEP

## (undated) DEVICE — DRESSING MEPILEX AG BORDER POST-OP 4 X 6 IN

## (undated) DEVICE — GLOVE SRG BIOGEL 7

## (undated) DEVICE — INTENDED FOR TISSUE SEPARATION, AND OTHER PROCEDURES THAT REQUIRE A SHARP SURGICAL BLADE TO PUNCTURE OR CUT.: Brand: BARD-PARKER SAFETY BLADES SIZE 11, STERILE

## (undated) DEVICE — TROCAR: Brand: KII® SLEEVE

## (undated) DEVICE — CHLORAPREP HI-LITE 26ML ORANGE

## (undated) DEVICE — 3000CC GUARDIAN II: Brand: GUARDIAN

## (undated) DEVICE — SUT VICRYL PLUS 0 UR-6 27IN VCP603H